# Patient Record
Sex: MALE | Race: WHITE | Employment: OTHER | URBAN - NONMETROPOLITAN AREA
[De-identification: names, ages, dates, MRNs, and addresses within clinical notes are randomized per-mention and may not be internally consistent; named-entity substitution may affect disease eponyms.]

---

## 2017-06-06 ENCOUNTER — OFFICE VISIT (OUTPATIENT)
Dept: CARDIOLOGY CLINIC | Age: 77
End: 2017-06-06

## 2017-06-06 ENCOUNTER — PROCEDURE VISIT (OUTPATIENT)
Dept: CARDIOLOGY CLINIC | Age: 77
End: 2017-06-06

## 2017-06-06 VITALS
WEIGHT: 192 LBS | HEIGHT: 71 IN | DIASTOLIC BLOOD PRESSURE: 74 MMHG | HEART RATE: 68 BPM | RESPIRATION RATE: 14 BRPM | SYSTOLIC BLOOD PRESSURE: 120 MMHG | BODY MASS INDEX: 26.88 KG/M2

## 2017-06-06 DIAGNOSIS — R06.02 SOB (SHORTNESS OF BREATH): ICD-10-CM

## 2017-06-06 DIAGNOSIS — E78.2 MIXED HYPERLIPIDEMIA: ICD-10-CM

## 2017-06-06 DIAGNOSIS — I10 ESSENTIAL HYPERTENSION: ICD-10-CM

## 2017-06-06 DIAGNOSIS — Z51.81 ENCOUNTER FOR MONITORING AMIODARONE THERAPY: ICD-10-CM

## 2017-06-06 DIAGNOSIS — Z95.820 S/P ANGIOPLASTY WITH STENT: ICD-10-CM

## 2017-06-06 DIAGNOSIS — I48.0 PAF (PAROXYSMAL ATRIAL FIBRILLATION) (HCC): Primary | ICD-10-CM

## 2017-06-06 DIAGNOSIS — Z79.899 ENCOUNTER FOR MONITORING AMIODARONE THERAPY: ICD-10-CM

## 2017-06-06 LAB
LV EF: 60 %
LVEF MODALITY: NORMAL

## 2017-06-06 PROCEDURE — 1123F ACP DISCUSS/DSCN MKR DOCD: CPT | Performed by: INTERNAL MEDICINE

## 2017-06-06 PROCEDURE — 78452 HT MUSCLE IMAGE SPECT MULT: CPT | Performed by: INTERNAL MEDICINE

## 2017-06-06 PROCEDURE — G8427 DOCREV CUR MEDS BY ELIG CLIN: HCPCS | Performed by: INTERNAL MEDICINE

## 2017-06-06 PROCEDURE — 1036F TOBACCO NON-USER: CPT | Performed by: INTERNAL MEDICINE

## 2017-06-06 PROCEDURE — 93000 ELECTROCARDIOGRAM COMPLETE: CPT | Performed by: INTERNAL MEDICINE

## 2017-06-06 PROCEDURE — 4040F PNEUMOC VAC/ADMIN/RCVD: CPT | Performed by: INTERNAL MEDICINE

## 2017-06-06 PROCEDURE — G8420 CALC BMI NORM PARAMETERS: HCPCS | Performed by: INTERNAL MEDICINE

## 2017-06-06 PROCEDURE — 93015 CV STRESS TEST SUPVJ I&R: CPT | Performed by: INTERNAL MEDICINE

## 2017-06-06 PROCEDURE — 99215 OFFICE O/P EST HI 40 MIN: CPT | Performed by: INTERNAL MEDICINE

## 2017-06-06 PROCEDURE — G8598 ASA/ANTIPLAT THER USED: HCPCS | Performed by: INTERNAL MEDICINE

## 2017-06-06 PROCEDURE — 94060 EVALUATION OF WHEEZING: CPT | Performed by: INTERNAL MEDICINE

## 2017-06-06 PROCEDURE — A9500 TC99M SESTAMIBI: HCPCS | Performed by: INTERNAL MEDICINE

## 2017-06-06 RX ORDER — LOVASTATIN 20 MG/1
20 TABLET ORAL NIGHTLY
Qty: 30 TABLET | Refills: 6 | Status: SHIPPED | OUTPATIENT
Start: 2017-06-06 | End: 2017-12-12 | Stop reason: SDUPTHER

## 2017-06-06 RX ORDER — INDAPAMIDE 1.25 MG/1
1.25 TABLET, FILM COATED ORAL EVERY MORNING
COMMUNITY
End: 2019-01-07 | Stop reason: SDUPTHER

## 2017-06-06 RX ORDER — AMIODARONE HYDROCHLORIDE 200 MG/1
200 TABLET ORAL DAILY
Qty: 30 TABLET | Refills: 6 | Status: SHIPPED | OUTPATIENT
Start: 2017-06-06 | End: 2017-12-12 | Stop reason: SDUPTHER

## 2017-06-07 ENCOUNTER — TELEPHONE (OUTPATIENT)
Dept: CARDIOLOGY CLINIC | Age: 77
End: 2017-06-07

## 2017-06-12 ENCOUNTER — HOSPITAL ENCOUNTER (OUTPATIENT)
Dept: CARDIAC REHAB | Age: 77
Discharge: HOME | End: 2017-06-12
Attending: INTERNAL MEDICINE | Admitting: INTERNAL MEDICINE

## 2017-06-12 DIAGNOSIS — R06.02 BREATH SHORTNESS: ICD-10-CM

## 2017-10-10 ENCOUNTER — HOSPITAL ENCOUNTER (OUTPATIENT)
Dept: LAB | Age: 77
Discharge: HOME | End: 2017-10-10
Attending: UROLOGY | Admitting: UROLOGY

## 2017-10-10 LAB — PROSTATE SPECIFIC ANTIGEN: 3.41 NG/ML (ref 0–4)

## 2017-12-07 ENCOUNTER — HOSPITAL ENCOUNTER (OUTPATIENT)
Dept: GENERAL RADIOLOGY | Age: 77
Discharge: HOME | End: 2017-12-07
Attending: FAMILY MEDICINE | Admitting: FAMILY MEDICINE

## 2017-12-07 DIAGNOSIS — M25.561 CHRONIC PAIN OF RIGHT KNEE: ICD-10-CM

## 2017-12-07 DIAGNOSIS — G89.29 CHRONIC PAIN OF RIGHT KNEE: ICD-10-CM

## 2017-12-12 ENCOUNTER — OFFICE VISIT (OUTPATIENT)
Dept: CARDIOLOGY CLINIC | Age: 77
End: 2017-12-12

## 2017-12-12 VITALS
SYSTOLIC BLOOD PRESSURE: 112 MMHG | HEIGHT: 71 IN | RESPIRATION RATE: 16 BRPM | WEIGHT: 187 LBS | BODY MASS INDEX: 26.18 KG/M2 | DIASTOLIC BLOOD PRESSURE: 62 MMHG | HEART RATE: 76 BPM

## 2017-12-12 DIAGNOSIS — E78.2 MIXED HYPERLIPIDEMIA: ICD-10-CM

## 2017-12-12 DIAGNOSIS — I48.0 PAF (PAROXYSMAL ATRIAL FIBRILLATION) (HCC): Primary | ICD-10-CM

## 2017-12-12 DIAGNOSIS — I10 ESSENTIAL HYPERTENSION: ICD-10-CM

## 2017-12-12 DIAGNOSIS — Z51.81 ENCOUNTER FOR MONITORING AMIODARONE THERAPY: ICD-10-CM

## 2017-12-12 DIAGNOSIS — Z95.820 S/P ANGIOPLASTY WITH STENT: ICD-10-CM

## 2017-12-12 DIAGNOSIS — Z79.899 ENCOUNTER FOR MONITORING AMIODARONE THERAPY: ICD-10-CM

## 2017-12-12 PROCEDURE — G8419 CALC BMI OUT NRM PARAM NOF/U: HCPCS | Performed by: INTERNAL MEDICINE

## 2017-12-12 PROCEDURE — G8598 ASA/ANTIPLAT THER USED: HCPCS | Performed by: INTERNAL MEDICINE

## 2017-12-12 PROCEDURE — 4040F PNEUMOC VAC/ADMIN/RCVD: CPT | Performed by: INTERNAL MEDICINE

## 2017-12-12 PROCEDURE — G8427 DOCREV CUR MEDS BY ELIG CLIN: HCPCS | Performed by: INTERNAL MEDICINE

## 2017-12-12 PROCEDURE — 99214 OFFICE O/P EST MOD 30 MIN: CPT | Performed by: INTERNAL MEDICINE

## 2017-12-12 PROCEDURE — 1036F TOBACCO NON-USER: CPT | Performed by: INTERNAL MEDICINE

## 2017-12-12 PROCEDURE — G8484 FLU IMMUNIZE NO ADMIN: HCPCS | Performed by: INTERNAL MEDICINE

## 2017-12-12 PROCEDURE — 1123F ACP DISCUSS/DSCN MKR DOCD: CPT | Performed by: INTERNAL MEDICINE

## 2017-12-12 PROCEDURE — 93000 ELECTROCARDIOGRAM COMPLETE: CPT | Performed by: INTERNAL MEDICINE

## 2017-12-12 RX ORDER — IBUPROFEN 800 MG/1
TABLET ORAL
Refills: 2 | COMMUNITY
Start: 2017-12-07 | End: 2019-08-05

## 2017-12-12 RX ORDER — AMIODARONE HYDROCHLORIDE 200 MG/1
200 TABLET ORAL DAILY
Qty: 30 TABLET | Refills: 6 | Status: SHIPPED | OUTPATIENT
Start: 2017-12-12 | End: 2018-07-02 | Stop reason: SDUPTHER

## 2017-12-12 RX ORDER — PNEUMOCOCCAL 13-VALENT CONJUGATE VACCINE 2.2; 2.2; 2.2; 2.2; 2.2; 4.4; 2.2; 2.2; 2.2; 2.2; 2.2; 2.2; 2.2 UG/.5ML; UG/.5ML; UG/.5ML; UG/.5ML; UG/.5ML; UG/.5ML; UG/.5ML; UG/.5ML; UG/.5ML; UG/.5ML; UG/.5ML; UG/.5ML; UG/.5ML
INJECTION, SUSPENSION INTRAMUSCULAR
Refills: 0 | COMMUNITY
Start: 2017-11-13 | End: 2019-08-05

## 2017-12-12 RX ORDER — LOVASTATIN 20 MG/1
20 TABLET ORAL NIGHTLY
Qty: 30 TABLET | Refills: 6 | Status: SHIPPED | OUTPATIENT
Start: 2017-12-12 | End: 2018-07-02 | Stop reason: SDUPTHER

## 2017-12-12 RX ORDER — INFLUENZA A VIRUS A/SINGAPORE/GP1908/2015 IVR-180 (H1N1) ANTIGEN (MDCK CELL DERIVED, PROPIOLACTONE INACTIVATED), INFLUENZA A VIRUS A/NORTH CAROLINA/04/2016 (H3N2) HEMAGGLUTININ ANTIGEN (MDCK CELL DERIVED, PROPIOLACTONE INACTIVATED), INFLUENZA B VIRUS B/IOWA/06/2017 HEMAGGLUTININ ANTIGEN (MDCK CELL DERIVED, PROPIOLACTONE INACTIVATED), INFLUENZA B VIRUS B/SINGAPORE/INFTT-16-0610/2016 HEMAGGLUTININ ANTIGEN (MDCK CELL DERIVED, PROPIOLACTONE INACTIVATED) 15; 15; 15; 15 UG/.5ML; UG/.5ML; UG/.5ML; UG/.5ML
INJECTION, SUSPENSION INTRAMUSCULAR
Refills: 0 | COMMUNITY
Start: 2017-11-13 | End: 2019-08-05

## 2017-12-12 NOTE — PROGRESS NOTES
Maximo Pina  1940  Nieves Villalta MD    Chief complaint and HPI:  Maximo Pina  is a 42-year-old male follows up for coronary artery disease status post PCI, hypertension and hyperlipidemia and paroxysmal atrial fibrillation on amiodarone therapy. He denies any palpitations, chest pain, dizziness, leg swelling. He is tolerating amiodarone well. He denies any change in his shortness of breath on activity. He hasn't been going to [facility] because he has developed painful bakers cyst behind his right knee. He is taking nonsteroidal anti-inflammatory drugs for this. He has been compliant to medications otherwise has been active. He doesn't smoke anymore used in the past.    Rest of the Cardiovascular system review is otherwise unchanged from prior encounter. Past medical history:  has a past medical history of Arthritis; Back pain, chronic; BPH (benign prostatic hyperplasia); CAD (coronary artery disease); Cancer ([facility] [address] Family history of coronary artery disease; H/O cardiovascular stress test; H/O echocardiogram; Hyperlipidemia; Hypertension; PAF (paroxysmal atrial fibrillation) ([facility] [address] S/P angioplasty with stent; SOB (shortness of breath); and Torsade de pointes. Past surgical history:  has a past surgical history that includes Abdominal hernia repair; Cataract removal; Diagnostic Cardiac Cath Lab Procedure ( 10/6/03;10/16/03;10/5/04;3/12); and Coronary angioplasty with stent. Social History:   Social History   Substance Use Topics    Smoking status: Former Smoker     Years: 25.00     Types: Cigarettes, Pipe     Quit date: 1/4/1980    Smokeless tobacco: Never Used    Alcohol use No      Comment: occassional     Family history: family history includes Coronary Art Dis in his brother and mother; Heart Attack in his mother; Hypertension in his mother. ALLERGIES:  Review of patient's allergies indicates no known allergies.   Prior to Admission medications    Medication Sig Start Date End Date Taking? Authorizing Provider   FLUCELVAX QUADRIVALENT SUSP  11/13/17  Yes Historical Provider, MD   PREVNAR 13 SUSP inj  11/13/17  Yes Historical Provider, MD   ibuprofen (ADVIL;MOTRIN) 800 MG tablet  12/7/17  Yes Historical Provider, MD   lovastatin (MEVACOR) 20 MG tablet Take 1 tablet by mouth nightly 12/12/17  Yes Kishan Dotson MD   amiodarone (CORDARONE) 200 MG tablet Take 1 tablet by mouth daily 12/12/17  Yes Kishan Dotson MD   indapamide (LOZOL) 1.25 MG tablet Take 1.25 mg by mouth every morning   Yes Historical Provider, MD   tamsulosin (FLOMAX) 0.4 MG capsule Take 0.4 mg by mouth daily 12/2/16  Yes Historical Provider, MD   Misc Natural Products (OSTEO BI-FLEX ADV JOINT SHIELD PO) Take by mouth daily   Yes Historical Provider, MD   multivitamin [facility]) per tablet Take 1 tablet by mouth daily. Yes Historical Provider, MD   amlodipine-benazepril (LOTREL) 5-20 MG per capsule Take 1 capsule by mouth daily. Yes Historical Provider, MD   aspirin 81 MG EC tablet Take 81 mg by mouth daily. Yes Historical Provider, MD     Constitutional:  /62 (Site: Left Arm, Position: Sitting, Cuff Size: Medium Adult)   Pulse 76   Resp 16   Ht 5' 11\" (1.803 m)   Wt 187 lb (84.8 kg)   BMI 26.08 kg/m²    Body mass index is 26.08 kg/m². Wt Readings from Last 3 Encounters:   12/12/17 187 lb (84.8 kg)   06/06/17 192 lb (87.1 kg)   12/05/16 187 lb (84.8 kg)     General exam: Patient is awake, alert and oriented and in no acute or apparent distress.   Head and Neck: Normocephalic. Neck is supple . Wears glasses   Carotids: no Bruits. No thyromegaly  Jugular venous pressure: not elevated. Heart[de-identified] Heart sounds are normal. No murmur  Peripheral Pulses: 1+  Extremities: no swelling  Chest: normal  Lungs:Lungs are clear to auscultation and percussion. No fine crepitations or rhonchi   Abdomen: Soft non tender.  Bowel sounds are normal. No organomegaly or ascites.   Musculoskeletal: WNL   Skin: Normal in color and texture. No rash   Psychiatric: Normal mood and effect. ECG showed a sinus rhythm 70 bpm low QRS voltages corrected QT interval is 409 ms. LAB REVIEW:  CBC:   Lab Results   Component Value Date    WBC 7.9 06/05/2013    HGB 14.9 06/05/2013    HCT 44.3 06/05/2013     06/05/2013     Lipids:   Lab Results   Component Value Date    CHOL 133 06/09/2016    TRIG 75 06/09/2016    HDL 40 (L) 06/09/2016    LDLCALC 71 05/29/2014    LDLDIRECT 81 06/09/2016     Renal:   Lab Results   Component Value Date    BUN 26 06/09/2016    CREATININE 1.3 06/09/2016     06/09/2016    K 4.1 06/09/2016     PT/INR:   Lab Results   Component Value Date    INR 0.96 06/05/2013     IMPRESSION and RECOMMENDATIONS:      S/P angioplasty with stent  Clinically stable. Continue aggressive risk modification for secondary prevention. PAF (paroxysmal atrial fibrillation)  Controlled on amiodarone therapy. He is tolerating it well we'll continue the same. Hyperlipidemia  Well-controlled on current medications. We will repeat the lipid analysis and liver enzymes. Continue current dose of statins. Hypertension  Well-controlled on current medications will continue the same    Encounter for monitoring amiodarone therapy  We'll check his TSH and liver function test.  His QTc interval is stable. Continue current cardiovascular medications which have been reviewed and discussed individually with you. Lipid analysis and Amio labs. . Primary/secondary prevention is the goal by aggressive risk modification, healthy and therapeutic life style changes for cardiovascular risk reduction. Various goals are discussed and questions answered. Appropriate prescriptions if needed on this visit are addressed. After visit summery is provided. Questions answered and patient verbalizes understanding. Follow up in office in 6 months with ECG sooner if needed.     Beatriz Messina MD, 12/12/2017 10:07 AM     Please note this report has

## 2017-12-18 ENCOUNTER — HOSPITAL ENCOUNTER (OUTPATIENT)
Dept: LAB | Age: 77
Discharge: HOME | End: 2017-12-18
Attending: INTERNAL MEDICINE | Admitting: INTERNAL MEDICINE

## 2017-12-18 LAB
ALBUMIN SERPL-MCNC: 3.9 GM/DL (ref 3.4–5)
ALP BLD-CCNC: 73 IU/L (ref 40–129)
ALT SERPL-CCNC: 12 U/L (ref 10–40)
ANION GAP SERPL CALCULATED.3IONS-SCNC: 12 MMOL/L (ref 4–16)
AST SERPL-CCNC: 15 IU/L (ref 15–37)
BILIRUB SERPL-MCNC: 0.9 MG/DL (ref 0–1)
BUN BLDV-MCNC: 27 MG/DL (ref 6–23)
CALCIUM SERPL-MCNC: 9 MG/DL (ref 8.3–10.6)
CHLORIDE BLD-SCNC: 101 MMOL/L (ref 99–110)
CHOLESTEROL, FASTING: 127 MG/DL
CO2: 30 MMOL/L (ref 21–32)
CREAT SERPL-MCNC: 1.3 MG/DL (ref 0.9–1.3)
GFR AFRICAN AMERICAN: >60 ML/MIN/1.73M2
GFR NON-AFRICAN AMERICAN: 53 ML/MIN/1.73M2
GLUCOSE FASTING: 106 MG/DL (ref 70–99)
HDLC SERPL-MCNC: 40 MG/DL
LDL CHOLESTEROL DIRECT: 77 MG/DL
POTASSIUM SERPL-SCNC: 3.8 MMOL/L (ref 3.5–5.1)
SODIUM BLD-SCNC: 143 MMOL/L (ref 135–145)
T4 FREE: 1.6 NG/DL (ref 0.9–1.8)
TOTAL PROTEIN: 6.4 GM/DL (ref 6.4–8.2)
TRIGLYCERIDE, FASTING: 86 MG/DL
TSH HIGH SENSITIVITY: 1.23 UIU/ML (ref 0.27–4.2)

## 2018-01-04 NOTE — PROGRESS NOTES
discussed. Questions answered. We had a lengthy discussion about the treatment options for managing arthritis. Options include activity modification (avoiding activities that make your symptoms worse) but staying active and keeping the legs as strong as possible, physical therapy, OTC medications, nutritional supplements, prescription medications, knee injections with steroid vs. Visco supplementation, and surgical options. Steroid injection   Rest right knee 24-48 hours   Physical therapy   Continue NSAIDS (ibuprofen) as needed   Add tylenol as needed   Follow up as needed       The patient was advised that NSAID-type medications have two very important potential side effects: gastrointestinal irritation including hemorrhage and renal injuries. He was asked to take the medication with food and to stop if he experiences any GI upset. I asked him to call for vomiting, abdominal pain or black/bloody stools. The patient expresses understanding of these issues and questions were answered. Add Tylenol (also known as acetaminophen) as needed   Take 2 extra strength (500 mg) or 3 regular strength (325 mg) 3-4 times a day  It is OK to take Tylenol in conjunction with NSAID's (Advil, Aleve, Naprosyn, etc)  If taking other medications that have acetaminophen ensure total acetaminophen dose for any 24 period is less than 4,000 mg    right knee injection procedure note    Pre-procedure diagnosis:  right knee DJD    Post-procedure diagnosis:  same    Procedure: The planned procedure/risks/benefits/alternatives were discussed with the patient. Risks include, but are not limited to, increased pain, drug reaction, infection, bleeding, lack of improvement, neurovascular injury, and increased blood sugar levels. The patient understood and all of their questions were answered. The knee was prepped with alcohol and betadine, then anesthetized with Ethyl Chloride spray.   A 22 gauge needle was advanced into the

## 2018-01-05 ENCOUNTER — OFFICE VISIT (OUTPATIENT)
Dept: ORTHOPEDIC SURGERY | Age: 78
End: 2018-01-05

## 2018-01-05 VITALS — HEIGHT: 71 IN | RESPIRATION RATE: 16 BRPM | BODY MASS INDEX: 26.18 KG/M2 | WEIGHT: 187 LBS

## 2018-01-05 DIAGNOSIS — M17.11 PRIMARY OSTEOARTHRITIS OF RIGHT KNEE: Primary | ICD-10-CM

## 2018-01-05 DIAGNOSIS — M25.561 RIGHT KNEE PAIN, UNSPECIFIED CHRONICITY: ICD-10-CM

## 2018-01-05 PROCEDURE — 4040F PNEUMOC VAC/ADMIN/RCVD: CPT | Performed by: ORTHOPAEDIC SURGERY

## 2018-01-05 PROCEDURE — 99204 OFFICE O/P NEW MOD 45 MIN: CPT | Performed by: ORTHOPAEDIC SURGERY

## 2018-01-05 PROCEDURE — G8484 FLU IMMUNIZE NO ADMIN: HCPCS | Performed by: ORTHOPAEDIC SURGERY

## 2018-01-05 PROCEDURE — G8419 CALC BMI OUT NRM PARAM NOF/U: HCPCS | Performed by: ORTHOPAEDIC SURGERY

## 2018-01-05 PROCEDURE — 73560 X-RAY EXAM OF KNEE 1 OR 2: CPT | Performed by: ORTHOPAEDIC SURGERY

## 2018-01-05 PROCEDURE — G8427 DOCREV CUR MEDS BY ELIG CLIN: HCPCS | Performed by: ORTHOPAEDIC SURGERY

## 2018-01-05 PROCEDURE — 20610 DRAIN/INJ JOINT/BURSA W/O US: CPT | Performed by: ORTHOPAEDIC SURGERY

## 2018-01-05 ASSESSMENT — ENCOUNTER SYMPTOMS
EYES NEGATIVE: 1
GASTROINTESTINAL NEGATIVE: 1
RESPIRATORY NEGATIVE: 1
BACK PAIN: 1

## 2018-01-23 ENCOUNTER — HOSPITAL ENCOUNTER (OUTPATIENT)
Dept: PHYSICAL THERAPY | Age: 78
Discharge: HOME | End: 2018-01-31
Attending: ORTHOPAEDIC SURGERY | Admitting: ORTHOPAEDIC SURGERY

## 2018-01-23 ASSESSMENT — PAIN DESCRIPTION - FREQUENCY: FREQUENCY: INTERMITTENT

## 2018-01-23 ASSESSMENT — PAIN DESCRIPTION - PROGRESSION: CLINICAL_PROGRESSION: GRADUALLY IMPROVING

## 2018-01-23 ASSESSMENT — PAIN SCALES - GENERAL: PAINLEVEL_OUTOF10: 4

## 2018-01-23 ASSESSMENT — PAIN DESCRIPTION - PAIN TYPE: TYPE: CHRONIC PAIN

## 2018-01-23 ASSESSMENT — PAIN DESCRIPTION - DESCRIPTORS: DESCRIPTORS: ACHING;SHARP

## 2018-01-23 ASSESSMENT — PAIN DESCRIPTION - LOCATION: LOCATION: KNEE

## 2018-01-23 ASSESSMENT — PAIN DESCRIPTION - ORIENTATION: ORIENTATION: RIGHT

## 2018-01-23 NOTE — FLOWSHEET NOTE
Outpatient Physical Therapy           [place]           [] Phone: [phone]   Fax: [phone]  [place] [facility]           [x] Phone: [phone]   Fax: [phone]    Physical Therapy Daily Treatment Note  Date:  2018    Patient Name:  Stacie Pulido    :  1940  MRN: [id number]  Restrictions/Precautions: none  Diagnosis:   Diagnosis: R knee pain  Date of Surgery:   Treatment Diagnosis: Treatment Diagnosis: R knee OA, mild weekness, limited ROM , pain    Insurance/Certification information:  medicare  Referring Physician:  Referring Practitioner: Ashli Robert Doctor Visit:  prn  Plan of care signed (Y/N):  no  Visit# / total visits:  -  Pain level: 4/10   Goals:       Short term goals  Time Frame for Short term goals: 4 visit   Short term goal 1: ind with HEP for knee ORm and strength  Short term goal 2: avg pain wiht light gym workout 210  Short term goal 3: PROM flex 123 ext 0 R kknee  Short term goal 4: stairs 2/10 pain or less reproted            Subjective:  Pt reports he was ind with all typical activity prior to 4 months ago , then the pain began to limit his stairs most and getting out of chairs, He reports that the cortisone shot has helped this greatly he still has pain in the R knee and L knee 4/10  throughout , also reprots he has a bakers cyst.      Any changes in Ambulatory Summary Sheet? no      Objective:  See eval        Exercises:  Exercise/Equipment Date Date Date Date     nustep         SLR flex          SLR abd         SAQ         HS stretch         gastroc stretch         PROM R knee         Mini squats         GT fwd/ lat                                                                                           Other Therapeutic Activities/Education:      Home Exercise Program:  Pt completed 1 set of HEP in clinic with instruction per HEP sheet dated today. Appeared to understand program. Any questions clarified.      Modality/intervention used:

## 2018-01-23 NOTE — PROGRESS NOTES
Physical Therapy  Initial Assessment  Date: 2018  Patient Name: Berdine Sacks  MRN: [id number]  : 1940     Treatment Diagnosis: R knee OA, mild weekness, limited ROM , pain    Restrictions  Restrictions/Precautions  Restrictions/Precautions:  (none)    Subjective   General  Chart Reviewed: Yes  Additional Pertinent Hx: cortisone shot helped greatly  Family / Caregiver Present: No  Referring Practitioner: Malissa  Referral Date : 18  Diagnosis: R knee pain  Follows Commands: Within Functional Limits  General Comment  Comments: xray shows OA  PT Visit Information  PT Insurance Information: medicare  Total # of Visits Approved:  (prn)  Subjective  Subjective: Pt states he had a cortisone shot int he R knee with Dr Dae Thomas and it has helped greatly with stairs, notes he still has pain in the R knee and L knee 4/10  throughout , also reprots he has a bakers cyst.  Pain Screening  Patient Currently in Pain: Yes  Pain Assessment  Pain Assessment: 0-10  Pain Level: 4  Pain Type: Chronic pain  Pain Location: Knee  Pain Orientation: Right  Pain Descriptors: Aching; Sharp  Pain Frequency: Intermittent  Clinical Progression: Gradually improving  Effect of Pain on Daily Activities: stairs, walking incresaed distances, slow to get out of chair light [employer] work out, getting out of small auto  Patient's Stated Pain Goal: 2  Pain Intervention(s): Medication (see eMar); Rest  Vital Signs  Patient Currently in Pain: Yes    Vision/Hearing  Vision  Vision: Within Functional Limits  Hearing  Hearing: Within functional limits    Orientation  Orientation  Overall Orientation Status: Within Normal Limits    Social/Functional History  Social/Functional History  Lives With: Spouse  Type of Home: House  Home Layout: Two level  Bathroom Shower/Tub: Tub/Shower unit  Bathroom Toilet: Standard  ADL Assistance: Independent  Homemaking Assistance:  (wife does this)  Homemaking Responsibilities: No  Ambulation Assistance:

## 2018-02-01 ENCOUNTER — HOSPITAL ENCOUNTER (OUTPATIENT)
Dept: PHYSICAL THERAPY | Age: 78
Discharge: HOME | End: 2018-02-22
Attending: ORTHOPAEDIC SURGERY | Admitting: ORTHOPAEDIC SURGERY

## 2018-07-02 ENCOUNTER — OFFICE VISIT (OUTPATIENT)
Dept: CARDIOLOGY CLINIC | Age: 78
End: 2018-07-02

## 2018-07-02 VITALS
BODY MASS INDEX: 25.9 KG/M2 | DIASTOLIC BLOOD PRESSURE: 66 MMHG | RESPIRATION RATE: 16 BRPM | SYSTOLIC BLOOD PRESSURE: 112 MMHG | WEIGHT: 185 LBS | HEART RATE: 76 BPM | HEIGHT: 71 IN

## 2018-07-02 DIAGNOSIS — Z95.820 S/P ANGIOPLASTY WITH STENT: Primary | ICD-10-CM

## 2018-07-02 DIAGNOSIS — R06.02 SOB (SHORTNESS OF BREATH): ICD-10-CM

## 2018-07-02 DIAGNOSIS — I48.0 PAF (PAROXYSMAL ATRIAL FIBRILLATION) (HCC): ICD-10-CM

## 2018-07-02 DIAGNOSIS — I10 ESSENTIAL HYPERTENSION: ICD-10-CM

## 2018-07-02 DIAGNOSIS — I25.10 CORONARY ARTERY DISEASE INVOLVING NATIVE CORONARY ARTERY OF NATIVE HEART WITHOUT ANGINA PECTORIS: ICD-10-CM

## 2018-07-02 DIAGNOSIS — E78.2 MIXED HYPERLIPIDEMIA: ICD-10-CM

## 2018-07-02 PROCEDURE — 1036F TOBACCO NON-USER: CPT | Performed by: INTERNAL MEDICINE

## 2018-07-02 PROCEDURE — 99214 OFFICE O/P EST MOD 30 MIN: CPT | Performed by: INTERNAL MEDICINE

## 2018-07-02 PROCEDURE — 4040F PNEUMOC VAC/ADMIN/RCVD: CPT | Performed by: INTERNAL MEDICINE

## 2018-07-02 PROCEDURE — G8419 CALC BMI OUT NRM PARAM NOF/U: HCPCS | Performed by: INTERNAL MEDICINE

## 2018-07-02 PROCEDURE — 1123F ACP DISCUSS/DSCN MKR DOCD: CPT | Performed by: INTERNAL MEDICINE

## 2018-07-02 PROCEDURE — 93000 ELECTROCARDIOGRAM COMPLETE: CPT | Performed by: INTERNAL MEDICINE

## 2018-07-02 PROCEDURE — G8427 DOCREV CUR MEDS BY ELIG CLIN: HCPCS | Performed by: INTERNAL MEDICINE

## 2018-07-02 PROCEDURE — G8598 ASA/ANTIPLAT THER USED: HCPCS | Performed by: INTERNAL MEDICINE

## 2018-07-02 RX ORDER — LOVASTATIN 20 MG/1
20 TABLET ORAL NIGHTLY
Qty: 30 TABLET | Refills: 6 | Status: SHIPPED | OUTPATIENT
Start: 2018-07-02 | End: 2019-01-07 | Stop reason: SDUPTHER

## 2018-07-02 RX ORDER — AMIODARONE HYDROCHLORIDE 200 MG/1
200 TABLET ORAL DAILY
Qty: 30 TABLET | Refills: 6 | Status: SHIPPED | OUTPATIENT
Start: 2018-07-02 | End: 2019-01-07 | Stop reason: SDUPTHER

## 2018-07-02 NOTE — PROGRESS NOTES
[id number] Score for Atrial Fibrillation Stroke Risk   Risk   Factors  Component Value   C CHF No 0   H HTN Yes 1   A2 Age >= 76 Yes,  (73 y.o.) 2   D DM No 0   S2 Prior Stroke/TIA No 0   V Vascular Disease No 0   A Age 74-69 No,  (73 y.o.) 0   Sc Sex male 0    [id number]  Score  3   Score last updated 4/9/40 10:67 AM    Click here for a link to the UpToDate guideline \"Atrial Fibrillation: Anticoagulation therapy to prevent embolization    Disclaimer: Risk Score calculation is dependent on accuracy of patient problem list and past encounter diagnosis.
office in 6 months, sooner if needed. Neo Whaley MD, 7/2/2018 11:44 AM     Please note this report has been partially produced using speech recognition software and may contain errors related to that system including errors in grammar, punctuation, and spelling, as well as words and phrases that may be inappropriate. If there are any questions or concerns please feel free to contact the dictating provider for clarification.

## 2018-08-20 ENCOUNTER — HOSPITAL ENCOUNTER (OUTPATIENT)
Dept: LAB | Age: 78
Discharge: HOME | End: 2018-08-20
Attending: FAMILY MEDICINE | Admitting: FAMILY MEDICINE

## 2018-08-20 LAB
ALBUMIN SERPL-MCNC: 4.3 GM/DL (ref 3.4–5)
ALP BLD-CCNC: 80 IU/L (ref 40–129)
ALT SERPL-CCNC: 12 U/L (ref 10–40)
ANION GAP SERPL CALCULATED.3IONS-SCNC: 11 MMOL/L (ref 4–16)
AST SERPL-CCNC: 13 IU/L (ref 15–37)
BILIRUB SERPL-MCNC: 0.9 MG/DL (ref 0–1)
BUN BLDV-MCNC: 29 MG/DL (ref 6–23)
CALCIUM SERPL-MCNC: 9.3 MG/DL (ref 8.3–10.6)
CHLORIDE BLD-SCNC: 101 MMOL/L (ref 99–110)
CHOLESTEROL, FASTING: 122 MG/DL
CO2: 30 MMOL/L (ref 21–32)
CREAT SERPL-MCNC: 1.4 MG/DL (ref 0.9–1.3)
GFR AFRICAN AMERICAN: 59 ML/MIN/1.73M2
GFR NON-AFRICAN AMERICAN: 49 ML/MIN/1.73M2
GLUCOSE FASTING: 104 MG/DL (ref 70–99)
HDLC SERPL-MCNC: 38 MG/DL
LDL CHOLESTEROL DIRECT: 82 MG/DL
POTASSIUM SERPL-SCNC: 4.2 MMOL/L (ref 3.5–5.1)
SODIUM BLD-SCNC: 142 MMOL/L (ref 135–145)
TOTAL PROTEIN: 6.6 GM/DL (ref 6.4–8.2)
TRIGLYCERIDE, FASTING: 103 MG/DL
TSH HIGH SENSITIVITY: 0.86 UIU/ML (ref 0.27–4.2)

## 2018-10-15 ENCOUNTER — HOSPITAL ENCOUNTER (OUTPATIENT)
Age: 78
Discharge: HOME | End: 2018-10-15
Payer: MEDICARE

## 2018-10-15 LAB — PROSTATE SPECIFIC ANTIGEN: 4.05 NG/ML (ref 0–4)

## 2018-10-15 PROCEDURE — G0103 PSA SCREENING: HCPCS

## 2018-10-15 PROCEDURE — 36415 COLL VENOUS BLD VENIPUNCTURE: CPT

## 2018-12-14 ENCOUNTER — OFFICE VISIT (OUTPATIENT)
Dept: ORTHOPEDIC SURGERY | Age: 78
End: 2018-12-14
Payer: MEDICARE

## 2018-12-14 VITALS — RESPIRATION RATE: 14 BRPM | WEIGHT: 185 LBS | OXYGEN SATURATION: 96 % | BODY MASS INDEX: 25.8 KG/M2 | HEART RATE: 91 BPM

## 2018-12-14 DIAGNOSIS — M17.11 PRIMARY OSTEOARTHRITIS OF RIGHT KNEE: Primary | ICD-10-CM

## 2018-12-14 DIAGNOSIS — M17.12 PRIMARY OSTEOARTHRITIS OF LEFT KNEE: ICD-10-CM

## 2018-12-14 DIAGNOSIS — R52 PAIN: ICD-10-CM

## 2018-12-14 PROCEDURE — 1123F ACP DISCUSS/DSCN MKR DOCD: CPT | Performed by: ORTHOPAEDIC SURGERY

## 2018-12-14 PROCEDURE — G8419 CALC BMI OUT NRM PARAM NOF/U: HCPCS | Performed by: ORTHOPAEDIC SURGERY

## 2018-12-14 PROCEDURE — 1101F PT FALLS ASSESS-DOCD LE1/YR: CPT | Performed by: ORTHOPAEDIC SURGERY

## 2018-12-14 PROCEDURE — G8484 FLU IMMUNIZE NO ADMIN: HCPCS | Performed by: ORTHOPAEDIC SURGERY

## 2018-12-14 PROCEDURE — 1036F TOBACCO NON-USER: CPT | Performed by: ORTHOPAEDIC SURGERY

## 2018-12-14 PROCEDURE — 20610 DRAIN/INJ JOINT/BURSA W/O US: CPT | Performed by: ORTHOPAEDIC SURGERY

## 2018-12-14 PROCEDURE — G8598 ASA/ANTIPLAT THER USED: HCPCS | Performed by: ORTHOPAEDIC SURGERY

## 2018-12-14 PROCEDURE — G8427 DOCREV CUR MEDS BY ELIG CLIN: HCPCS | Performed by: ORTHOPAEDIC SURGERY

## 2018-12-14 PROCEDURE — 4040F PNEUMOC VAC/ADMIN/RCVD: CPT | Performed by: ORTHOPAEDIC SURGERY

## 2018-12-14 PROCEDURE — 99213 OFFICE O/P EST LOW 20 MIN: CPT | Performed by: ORTHOPAEDIC SURGERY

## 2018-12-14 ASSESSMENT — ENCOUNTER SYMPTOMS
BACK PAIN: 1
EYES NEGATIVE: 1
GASTROINTESTINAL NEGATIVE: 1
SHORTNESS OF BREATH: 1

## 2018-12-14 NOTE — PROGRESS NOTES
ml of depo-medrol (80mg/ml). The injection site was cleansed with isopropyl alcohol and a band-aid was placed. Complications:  None, the patient   the procedure well. Instructions: The patient was advised to rest the knee and decrease activity for the next 24 to 48 hours. May use prescription or OTC pain relievers as needed for any post-injection pain as well as ice.

## 2019-01-07 ENCOUNTER — OFFICE VISIT (OUTPATIENT)
Dept: CARDIOLOGY CLINIC | Age: 79
End: 2019-01-07
Payer: MEDICARE

## 2019-01-07 VITALS
SYSTOLIC BLOOD PRESSURE: 101 MMHG | WEIGHT: 182 LBS | BODY MASS INDEX: 25.48 KG/M2 | DIASTOLIC BLOOD PRESSURE: 60 MMHG | HEART RATE: 79 BPM | HEIGHT: 71 IN

## 2019-01-07 DIAGNOSIS — I48.0 PAF (PAROXYSMAL ATRIAL FIBRILLATION) (HCC): ICD-10-CM

## 2019-01-07 DIAGNOSIS — E78.2 MIXED HYPERLIPIDEMIA: ICD-10-CM

## 2019-01-07 DIAGNOSIS — I10 ESSENTIAL HYPERTENSION: ICD-10-CM

## 2019-01-07 DIAGNOSIS — Z95.820 S/P ANGIOPLASTY WITH STENT: ICD-10-CM

## 2019-01-07 DIAGNOSIS — R06.02 SOB (SHORTNESS OF BREATH): Primary | ICD-10-CM

## 2019-01-07 PROCEDURE — 1101F PT FALLS ASSESS-DOCD LE1/YR: CPT | Performed by: INTERNAL MEDICINE

## 2019-01-07 PROCEDURE — G8419 CALC BMI OUT NRM PARAM NOF/U: HCPCS | Performed by: INTERNAL MEDICINE

## 2019-01-07 PROCEDURE — 4040F PNEUMOC VAC/ADMIN/RCVD: CPT | Performed by: INTERNAL MEDICINE

## 2019-01-07 PROCEDURE — 1123F ACP DISCUSS/DSCN MKR DOCD: CPT | Performed by: INTERNAL MEDICINE

## 2019-01-07 PROCEDURE — G8427 DOCREV CUR MEDS BY ELIG CLIN: HCPCS | Performed by: INTERNAL MEDICINE

## 2019-01-07 PROCEDURE — 99214 OFFICE O/P EST MOD 30 MIN: CPT | Performed by: INTERNAL MEDICINE

## 2019-01-07 PROCEDURE — 1036F TOBACCO NON-USER: CPT | Performed by: INTERNAL MEDICINE

## 2019-01-07 PROCEDURE — G8598 ASA/ANTIPLAT THER USED: HCPCS | Performed by: INTERNAL MEDICINE

## 2019-01-07 PROCEDURE — 93000 ELECTROCARDIOGRAM COMPLETE: CPT | Performed by: INTERNAL MEDICINE

## 2019-01-07 PROCEDURE — G8484 FLU IMMUNIZE NO ADMIN: HCPCS | Performed by: INTERNAL MEDICINE

## 2019-01-07 RX ORDER — AMIODARONE HYDROCHLORIDE 100 MG/1
100 TABLET ORAL DAILY
Qty: 30 TABLET | Refills: 6 | Status: SHIPPED | OUTPATIENT
Start: 2019-01-07 | End: 2019-08-05 | Stop reason: DRUGHIGH

## 2019-01-07 RX ORDER — LOVASTATIN 20 MG/1
20 TABLET ORAL NIGHTLY
Qty: 30 TABLET | Refills: 6 | Status: SHIPPED | OUTPATIENT
Start: 2019-01-07 | End: 2019-08-05 | Stop reason: SDUPTHER

## 2019-01-07 RX ORDER — INDAPAMIDE 1.25 MG/1
1.25 TABLET, FILM COATED ORAL EVERY OTHER DAY
Qty: 45 TABLET | Refills: 5
Start: 2019-01-07 | End: 2019-08-05 | Stop reason: DRUGHIGH

## 2019-07-09 ENCOUNTER — HOSPITAL ENCOUNTER (OUTPATIENT)
Age: 79
Discharge: HOME | End: 2019-07-09
Payer: MEDICARE

## 2019-07-09 LAB
ALBUMIN SERPL-MCNC: 3.9 GM/DL (ref 3.4–5)
ALP BLD-CCNC: 73 IU/L (ref 40–129)
ALT SERPL-CCNC: 13 U/L (ref 10–40)
ANION GAP SERPL CALCULATED.3IONS-SCNC: 9 MMOL/L (ref 4–16)
AST SERPL-CCNC: 13 IU/L (ref 15–37)
BILIRUB SERPL-MCNC: 0.8 MG/DL (ref 0–1)
BUN BLDV-MCNC: 27 MG/DL (ref 6–23)
CALCIUM SERPL-MCNC: 9.6 MG/DL (ref 8.3–10.6)
CHLORIDE BLD-SCNC: 103 MMOL/L (ref 99–110)
CHOLESTEROL, FASTING: 130 MG/DL
CO2: 31 MMOL/L (ref 21–32)
CREAT SERPL-MCNC: 1.3 MG/DL (ref 0.9–1.3)
GFR AFRICAN AMERICAN: >60 ML/MIN/1.73M2
GFR NON-AFRICAN AMERICAN: 53 ML/MIN/1.73M2
GLUCOSE FASTING: 103 MG/DL (ref 70–99)
HDLC SERPL-MCNC: 40 MG/DL
LDL CHOLESTEROL DIRECT: 84 MG/DL
POTASSIUM SERPL-SCNC: 4.1 MMOL/L (ref 3.5–5.1)
SODIUM BLD-SCNC: 143 MMOL/L (ref 135–145)
TOTAL PROTEIN: 6.2 GM/DL (ref 6.4–8.2)
TRIGLYCERIDE, FASTING: 110 MG/DL

## 2019-07-09 PROCEDURE — 36415 COLL VENOUS BLD VENIPUNCTURE: CPT

## 2019-07-09 PROCEDURE — 80053 COMPREHEN METABOLIC PANEL: CPT

## 2019-07-09 PROCEDURE — 80061 LIPID PANEL: CPT

## 2019-08-05 ENCOUNTER — OFFICE VISIT (OUTPATIENT)
Dept: CARDIOLOGY CLINIC | Age: 79
End: 2019-08-05
Payer: MEDICARE

## 2019-08-05 VITALS
RESPIRATION RATE: 16 BRPM | DIASTOLIC BLOOD PRESSURE: 70 MMHG | HEIGHT: 71 IN | BODY MASS INDEX: 26.32 KG/M2 | SYSTOLIC BLOOD PRESSURE: 116 MMHG | HEART RATE: 77 BPM | WEIGHT: 188 LBS

## 2019-08-05 DIAGNOSIS — I10 ESSENTIAL HYPERTENSION: ICD-10-CM

## 2019-08-05 DIAGNOSIS — Z95.820 S/P ANGIOPLASTY WITH STENT: Primary | ICD-10-CM

## 2019-08-05 DIAGNOSIS — M79.89 LEG SWELLING: ICD-10-CM

## 2019-08-05 DIAGNOSIS — E78.2 MIXED HYPERLIPIDEMIA: ICD-10-CM

## 2019-08-05 DIAGNOSIS — R06.02 SOB (SHORTNESS OF BREATH): ICD-10-CM

## 2019-08-05 DIAGNOSIS — I48.0 PAF (PAROXYSMAL ATRIAL FIBRILLATION) (HCC): ICD-10-CM

## 2019-08-05 PROCEDURE — 4040F PNEUMOC VAC/ADMIN/RCVD: CPT | Performed by: INTERNAL MEDICINE

## 2019-08-05 PROCEDURE — G8427 DOCREV CUR MEDS BY ELIG CLIN: HCPCS | Performed by: INTERNAL MEDICINE

## 2019-08-05 PROCEDURE — G8419 CALC BMI OUT NRM PARAM NOF/U: HCPCS | Performed by: INTERNAL MEDICINE

## 2019-08-05 PROCEDURE — 1036F TOBACCO NON-USER: CPT | Performed by: INTERNAL MEDICINE

## 2019-08-05 PROCEDURE — G8598 ASA/ANTIPLAT THER USED: HCPCS | Performed by: INTERNAL MEDICINE

## 2019-08-05 PROCEDURE — 1123F ACP DISCUSS/DSCN MKR DOCD: CPT | Performed by: INTERNAL MEDICINE

## 2019-08-05 PROCEDURE — 99214 OFFICE O/P EST MOD 30 MIN: CPT | Performed by: INTERNAL MEDICINE

## 2019-08-05 PROCEDURE — 93000 ELECTROCARDIOGRAM COMPLETE: CPT | Performed by: INTERNAL MEDICINE

## 2019-08-05 RX ORDER — AMIODARONE HYDROCHLORIDE 200 MG/1
TABLET ORAL
COMMUNITY
End: 2019-08-05 | Stop reason: SDUPTHER

## 2019-08-05 RX ORDER — INDAPAMIDE 1.25 MG/1
1.25 TABLET, FILM COATED ORAL EVERY MORNING
COMMUNITY
End: 2022-06-06 | Stop reason: SDUPTHER

## 2019-08-05 RX ORDER — IBUPROFEN 200 MG
200 TABLET ORAL EVERY 6 HOURS PRN
COMMUNITY
End: 2020-08-11

## 2019-08-05 RX ORDER — LOVASTATIN 20 MG/1
20 TABLET ORAL NIGHTLY
Qty: 30 TABLET | Refills: 6 | Status: SHIPPED
Start: 2019-08-05 | End: 2020-02-11 | Stop reason: ALTCHOICE

## 2019-08-05 RX ORDER — AMIODARONE HYDROCHLORIDE 200 MG/1
TABLET ORAL
Qty: 15 TABLET | Refills: 6 | Status: SHIPPED | OUTPATIENT
Start: 2019-08-05 | End: 2020-04-27

## 2019-08-05 NOTE — PATIENT INSTRUCTIONS
Echo and lower extremity venous doppler and chart check. Counseled to elevate feet when resting and when resting in bed at night with pillows underneath and use thigh high compression stockings in the morning and remove them at night. Patient verbalizes understanding. Questions answered. Continue current cardiovascular medications which have been reviewed and discussed individually with you. Continue to exercise. Appropriate prescriptions if needed on this visit are addressed. After visit summery is provided. Questions answered and patient verbalizes understanding. Follow up in office in 6 months with ECG, sooner if needed. Please hold on to these instructions the  will call you within 1-9 business days when we receive authorization from your insurance. Echocardiogram    WHAT TO EXPECT:   ? This test will take approximately 45 minutes. ? It is an ultrasound of the heart. ? It can look at the valves and chambers inside the heart   ? There is no special instructions for this test.     If you are unable to keep this appointment, please notify us 24 hours prior to test at [phone]. Please hold on to these instructions the  will call you within 1-9 business days when we receive authorization from your insurance. Venous Ultrasound    WHAT TO EXPECT:   ? This test will take approximately 60 minutes. ? It is an ultrasound of the veins. ? It can look for blood clots in the extremities or  for venous reflux. ? There is no special instructions for this test.     If you are unable to keep this appointment, please notify us 24 hours prior to test at [phone].

## 2019-08-13 ENCOUNTER — PROCEDURE VISIT (OUTPATIENT)
Dept: CARDIOLOGY CLINIC | Age: 79
End: 2019-08-13
Payer: MEDICARE

## 2019-08-13 DIAGNOSIS — R06.02 SOB (SHORTNESS OF BREATH): ICD-10-CM

## 2019-08-13 DIAGNOSIS — R06.02 SOB (SHORTNESS OF BREATH): Primary | ICD-10-CM

## 2019-08-13 DIAGNOSIS — M79.89 LEG SWELLING: Primary | ICD-10-CM

## 2019-08-13 LAB
LV EF: 53 %
LVEF MODALITY: NORMAL

## 2019-08-13 PROCEDURE — 93970 EXTREMITY STUDY: CPT | Performed by: INTERNAL MEDICINE

## 2019-08-13 PROCEDURE — 93306 TTE W/DOPPLER COMPLETE: CPT | Performed by: INTERNAL MEDICINE

## 2019-08-15 ENCOUNTER — TELEPHONE (OUTPATIENT)
Dept: CARDIOLOGY CLINIC | Age: 79
End: 2019-08-15

## 2019-08-15 NOTE — TELEPHONE ENCOUNTER
No evidence of DVT or SVT in the bilateral common femoral vein, femoral    vein, popliteal vein, greater saphenous vein or left small saphenous vein.    There appears to be calcified thrombus with in the right small saphenous    vein.    Significant reflux noted of the Right CFV 5.0s.          Summary   Normal left ventricle size, wall thickness and wall motion with normal   systolic function Ejection Fraction 93-54 %.   Diastolic Dysfunction Grade I .   Sclerotic, but non-stenotic aortic valve.   Mitral annular calcification is present.   No significant valvular regurgitation noted.   Aortic root dimension upper limits of normal 3.7cm.  RVSP= 25 mmHg.   No evidence of pericardial effusion. LM for patient to contact office for message.

## 2019-08-16 ENCOUNTER — TELEPHONE (OUTPATIENT)
Dept: CARDIOLOGY CLINIC | Age: 79
End: 2019-08-16

## 2019-10-14 ENCOUNTER — HOSPITAL ENCOUNTER (OUTPATIENT)
Age: 79
Discharge: HOME | End: 2019-10-14
Payer: MEDICARE

## 2019-10-14 LAB — PROSTATE SPECIFIC ANTIGEN: 5.78 NG/ML (ref 0–4)

## 2019-10-14 PROCEDURE — 36415 COLL VENOUS BLD VENIPUNCTURE: CPT

## 2019-10-14 PROCEDURE — G0103 PSA SCREENING: HCPCS

## 2019-12-17 ENCOUNTER — HOSPITAL ENCOUNTER (OUTPATIENT)
Dept: GENERAL RADIOLOGY | Age: 79
Discharge: HOME | End: 2019-12-17
Payer: MEDICARE

## 2019-12-17 ENCOUNTER — HOSPITAL ENCOUNTER (OUTPATIENT)
Age: 79
Discharge: HOME | End: 2019-12-17
Payer: MEDICARE

## 2019-12-17 DIAGNOSIS — M25.562 CHRONIC PAIN OF BOTH KNEES: ICD-10-CM

## 2019-12-17 DIAGNOSIS — G89.29 CHRONIC PAIN OF BOTH KNEES: ICD-10-CM

## 2019-12-17 DIAGNOSIS — M25.561 CHRONIC PAIN OF BOTH KNEES: ICD-10-CM

## 2019-12-17 PROCEDURE — 73562 X-RAY EXAM OF KNEE 3: CPT

## 2020-02-11 ENCOUNTER — OFFICE VISIT (OUTPATIENT)
Dept: CARDIOLOGY CLINIC | Age: 80
End: 2020-02-11
Payer: MEDICARE

## 2020-02-11 VITALS
HEIGHT: 71 IN | WEIGHT: 181.4 LBS | SYSTOLIC BLOOD PRESSURE: 114 MMHG | HEART RATE: 64 BPM | DIASTOLIC BLOOD PRESSURE: 58 MMHG | BODY MASS INDEX: 25.4 KG/M2

## 2020-02-11 PROCEDURE — G8417 CALC BMI ABV UP PARAM F/U: HCPCS | Performed by: INTERNAL MEDICINE

## 2020-02-11 PROCEDURE — 1123F ACP DISCUSS/DSCN MKR DOCD: CPT | Performed by: INTERNAL MEDICINE

## 2020-02-11 PROCEDURE — G8427 DOCREV CUR MEDS BY ELIG CLIN: HCPCS | Performed by: INTERNAL MEDICINE

## 2020-02-11 PROCEDURE — 4040F PNEUMOC VAC/ADMIN/RCVD: CPT | Performed by: INTERNAL MEDICINE

## 2020-02-11 PROCEDURE — 99214 OFFICE O/P EST MOD 30 MIN: CPT | Performed by: INTERNAL MEDICINE

## 2020-02-11 PROCEDURE — 93000 ELECTROCARDIOGRAM COMPLETE: CPT | Performed by: INTERNAL MEDICINE

## 2020-02-11 PROCEDURE — 1036F TOBACCO NON-USER: CPT | Performed by: INTERNAL MEDICINE

## 2020-02-11 PROCEDURE — G8484 FLU IMMUNIZE NO ADMIN: HCPCS | Performed by: INTERNAL MEDICINE

## 2020-02-11 RX ORDER — ATORVASTATIN CALCIUM 40 MG/1
40 TABLET, FILM COATED ORAL DAILY
Qty: 90 TABLET | Refills: 3 | Status: SHIPPED | OUTPATIENT
Start: 2020-02-11 | End: 2020-08-11 | Stop reason: SDUPTHER

## 2020-02-11 NOTE — PROGRESS NOTES
masses or tenderness. No organomegaly noted. Musculoskeletal:  No significant spinal deformities noted.  Gait is normal  Muscle strength is normal.  Neurologic:  Oriented to time, place, and person and non-anxious. No focal neurological deficit noted. Psychiatric: Normal mood and effect     EKG today is consistent with normal sinus rhythm 63 bpm.  QTc is 408 ms. LAB REVIEW:  CBC:   Lab Results   Component Value Date    WBC 7.9 06/05/2013    HGB 14.9 06/05/2013    HCT 44.3 06/05/2013     06/05/2013     Lipids:   Lab Results   Component Value Date    CHOL 133 06/09/2016    TRIG 75 06/09/2016    HDL 40 (L) 07/09/2019    LDLDIRECT 84 07/09/2019     Renal:   Lab Results   Component Value Date    BUN 27 07/09/2019    CREATININE 1.3 07/09/2019     07/09/2019    K 4.1 07/09/2019     PT/INR:   Lab Results   Component Value Date    INR 0.96 06/05/2013     IMPRESSION and RECOMMENDATIONS:      Hyperlipidemia  Change Lovostatin to Lipitor 40 mg daily if tolerated and repeat Lipids in 2 months. LDL goal is below 70 if possible. S/P angioplasty with stent  Clinically stable. Continue risk modification for secondary prevention and follow-up. SOB (shortness of breath)  Has been stable. Leg swelling  Has resolved    Hypertension  Well controlled on current medications, reviewed individually with patient. PAF (paroxysmal atrial fibrillation) (HCC)  Well controlled on current medications continue the same. Change Lovostatin to Lipitor 40 mg daily if tolerated and repeat Lipids in 2 months. LDL goal is below 70 if possible. Primary/secondary prevention is the goal by aggressive risk modification, healthy and therapeutic life style changes for cardiovascular risk reduction. Various goals are discussed and questions answered. Appropriate prescriptions if needed on this visit are addressed. After visit summery is provided. Questions answered and patient verbalizes understanding.  Follow up in 6 months with ECG,  sooner if needed. Isaura Cosby MD, 2/11/2020 3:37 PM     Please note this report has been partially produced using speech recognition software and may contain errors related to that system including errors in grammar, punctuation, and spelling, as well as words and phrases that may be inappropriate. If there are any questions or concerns please feel free to contact the dictating provider for clarification.

## 2020-02-11 NOTE — PATIENT INSTRUCTIONS
Change Lovostatin to Lipitor 40 mg daily if tolerated and repeat Lipids in 2 months. LDL goal is below 70 if possible. Primary/secondary prevention is the goal by aggressive risk modification, healthy and therapeutic life style changes for cardiovascular risk reduction. Various goals are discussed and questions answered. Appropriate prescriptions if needed on this visit are addressed. After visit summery is provided. Questions answered and patient verbalizes understanding. Follow up in 6 months with ECG,  sooner if needed.

## 2020-02-25 ENCOUNTER — HOSPITAL ENCOUNTER (OUTPATIENT)
Age: 80
Discharge: HOME | End: 2020-02-25
Payer: MEDICARE

## 2020-02-25 ENCOUNTER — OFFICE VISIT (OUTPATIENT)
Dept: INTERNAL MEDICINE CLINIC | Age: 80
End: 2020-02-25
Payer: MEDICARE

## 2020-02-25 ENCOUNTER — HOSPITAL ENCOUNTER (OUTPATIENT)
Dept: GENERAL RADIOLOGY | Age: 80
Discharge: HOME | End: 2020-02-25
Payer: MEDICARE

## 2020-02-25 VITALS
HEIGHT: 71 IN | OXYGEN SATURATION: 96 % | SYSTOLIC BLOOD PRESSURE: 110 MMHG | DIASTOLIC BLOOD PRESSURE: 60 MMHG | TEMPERATURE: 98.1 F | WEIGHT: 183 LBS | HEART RATE: 96 BPM | BODY MASS INDEX: 25.62 KG/M2

## 2020-02-25 PROCEDURE — G8484 FLU IMMUNIZE NO ADMIN: HCPCS | Performed by: INTERNAL MEDICINE

## 2020-02-25 PROCEDURE — 71046 X-RAY EXAM CHEST 2 VIEWS: CPT

## 2020-02-25 PROCEDURE — G8417 CALC BMI ABV UP PARAM F/U: HCPCS | Performed by: INTERNAL MEDICINE

## 2020-02-25 PROCEDURE — 4040F PNEUMOC VAC/ADMIN/RCVD: CPT | Performed by: INTERNAL MEDICINE

## 2020-02-25 PROCEDURE — G8427 DOCREV CUR MEDS BY ELIG CLIN: HCPCS | Performed by: INTERNAL MEDICINE

## 2020-02-25 PROCEDURE — 99203 OFFICE O/P NEW LOW 30 MIN: CPT | Performed by: INTERNAL MEDICINE

## 2020-02-25 PROCEDURE — 1036F TOBACCO NON-USER: CPT | Performed by: INTERNAL MEDICINE

## 2020-02-25 PROCEDURE — 1123F ACP DISCUSS/DSCN MKR DOCD: CPT | Performed by: INTERNAL MEDICINE

## 2020-02-25 RX ORDER — GUAIFENESIN 600 MG/1
600 TABLET, EXTENDED RELEASE ORAL 2 TIMES DAILY
Qty: 30 TABLET | Refills: 0 | Status: SHIPPED | OUTPATIENT
Start: 2020-02-25 | End: 2020-03-11

## 2020-02-25 RX ORDER — AZITHROMYCIN 250 MG/1
250 TABLET, FILM COATED ORAL SEE ADMIN INSTRUCTIONS
Qty: 6 TABLET | Refills: 0 | Status: SHIPPED | OUTPATIENT
Start: 2020-02-25 | End: 2020-03-01

## 2020-02-25 SDOH — ECONOMIC STABILITY: FOOD INSECURITY: WITHIN THE PAST 12 MONTHS, YOU WORRIED THAT YOUR FOOD WOULD RUN OUT BEFORE YOU GOT MONEY TO BUY MORE.: NEVER TRUE

## 2020-02-25 SDOH — ECONOMIC STABILITY: FOOD INSECURITY: WITHIN THE PAST 12 MONTHS, THE FOOD YOU BOUGHT JUST DIDN'T LAST AND YOU DIDN'T HAVE MONEY TO GET MORE.: NEVER TRUE

## 2020-02-25 SDOH — ECONOMIC STABILITY: TRANSPORTATION INSECURITY
IN THE PAST 12 MONTHS, HAS THE LACK OF TRANSPORTATION KEPT YOU FROM MEDICAL APPOINTMENTS OR FROM GETTING MEDICATIONS?: NO

## 2020-02-25 SDOH — ECONOMIC STABILITY: TRANSPORTATION INSECURITY
IN THE PAST 12 MONTHS, HAS LACK OF TRANSPORTATION KEPT YOU FROM MEETINGS, WORK, OR FROM GETTING THINGS NEEDED FOR DAILY LIVING?: NO

## 2020-02-25 SDOH — ECONOMIC STABILITY: INCOME INSECURITY: HOW HARD IS IT FOR YOU TO PAY FOR THE VERY BASICS LIKE FOOD, HOUSING, MEDICAL CARE, AND HEATING?: NOT HARD AT ALL

## 2020-02-25 ASSESSMENT — PATIENT HEALTH QUESTIONNAIRE - PHQ9
SUM OF ALL RESPONSES TO PHQ9 QUESTIONS 1 & 2: 0
1. LITTLE INTEREST OR PLEASURE IN DOING THINGS: 0
2. FEELING DOWN, DEPRESSED OR HOPELESS: 0
SUM OF ALL RESPONSES TO PHQ QUESTIONS 1-9: 0
SUM OF ALL RESPONSES TO PHQ QUESTIONS 1-9: 0

## 2020-02-25 NOTE — PROGRESS NOTES
no blurry vision, no diplopia, no dryness, no itchiness  Mouth: no oral ulcers, no dry mouth, no sore throat  Nose: no nasal congestion, no epistaxis  Cardiac: no chest pain, no palpitations, no leg swelling, no orthopnea, no PND, no syncope  Pulmonary: no dyspnea, chest tightness, cough, wheezing, no hemoptysis  GI: no nausea, no vomiting, no diarrhea, no constipation, no abdominal pain, no hematochezia  : no dysuria, no frequency, no urgency, no hematuria, no frothy urine  MSK: no arthralgias, no myalgias, no early morning stiffness, no Raynaud's   Neuro: no focal neurological deficits, no seizures  Sleep: no snoring, no daytime somnolence   Psych: no depression, no anxiety, no suicidal ideation      Physical Exam:  VITALS:   /60   Pulse 96   Temp 98.1 °F (36.7 °C) (Oral)   Ht 5' 11\" (1.803 m)   Wt 183 lb (83 kg)   SpO2 96%   BMI 25.52 kg/m²     PHYSICAL EXAMINATION:  General: alert, awake, and oriented to time, place, person, and situation. Not in acute distress   Skin:  no suspicious rashes, no jaundice  Head: normocephalic/atraumatic  Eyes: anicteric sclera, well-injected conjunctiva. Pupils are equally round and reactive to light. Extraocular movements are intact   Nose/Sinuses: no sinus tenderness, septum midline, mucosa appears normal   Oropharynx: lips, teeth, and tongue normal. Non-erythematous pharynx, no oral ulcers, moist mucous membranes, no tonsillar exudates   Ears: external ears normal  Neck: supple, no cervical lymphadenopathy, thyroid symmetric and not enlarged, no bruits   Heart: regular rate and rhythm, regular S1/S2, no S3/S4, no audible murmurs, no audible friction rub  Lungs: bilateral crackles, no audible wheezes, no audible rhonchi    Abdomen: normal bowel sounds, soft abdomen, non-tender, no palpable masses  Extremities: no edema, warm, no cyanosis, no clubbing. Good capillary refill   MSK: no tenderness across spinous processes, full ROM in all 4 extremities.  No joint

## 2020-02-27 ENCOUNTER — TELEPHONE (OUTPATIENT)
Dept: INTERNAL MEDICINE CLINIC | Age: 80
End: 2020-02-27

## 2020-04-27 RX ORDER — AMIODARONE HYDROCHLORIDE 200 MG/1
TABLET ORAL
Qty: 15 TABLET | Refills: 6 | Status: SHIPPED | OUTPATIENT
Start: 2020-04-27 | End: 2020-08-11 | Stop reason: SDUPTHER

## 2020-05-11 ENCOUNTER — HOSPITAL ENCOUNTER (OUTPATIENT)
Age: 80
Discharge: HOME | End: 2020-05-11
Payer: MEDICARE

## 2020-05-11 LAB
ALBUMIN SERPL-MCNC: 3.9 GM/DL (ref 3.4–5)
ALP BLD-CCNC: 87 IU/L (ref 40–129)
ALT SERPL-CCNC: 18 U/L (ref 10–40)
ANION GAP SERPL CALCULATED.3IONS-SCNC: 10 MMOL/L (ref 4–16)
AST SERPL-CCNC: 15 IU/L (ref 15–37)
BILIRUB SERPL-MCNC: 0.7 MG/DL (ref 0–1)
BUN BLDV-MCNC: 21 MG/DL (ref 6–23)
CALCIUM SERPL-MCNC: 9.3 MG/DL (ref 8.3–10.6)
CHLORIDE BLD-SCNC: 101 MMOL/L (ref 99–110)
CHOLESTEROL: 101 MG/DL
CO2: 31 MMOL/L (ref 21–32)
CREAT SERPL-MCNC: 1.2 MG/DL (ref 0.9–1.3)
GFR AFRICAN AMERICAN: >60 ML/MIN/1.73M2
GFR NON-AFRICAN AMERICAN: 58 ML/MIN/1.73M2
GLUCOSE BLD-MCNC: 115 MG/DL (ref 70–99)
HDLC SERPL-MCNC: 36 MG/DL
LDL CHOLESTEROL DIRECT: 54 MG/DL
POTASSIUM SERPL-SCNC: 4 MMOL/L (ref 3.5–5.1)
PROSTATE SPECIFIC ANTIGEN: 3.74 NG/ML (ref 0–4)
SODIUM BLD-SCNC: 142 MMOL/L (ref 135–145)
TOTAL PROTEIN: 6.2 GM/DL (ref 6.4–8.2)
TRIGL SERPL-MCNC: 101 MG/DL

## 2020-05-11 PROCEDURE — 80061 LIPID PANEL: CPT

## 2020-05-11 PROCEDURE — 36415 COLL VENOUS BLD VENIPUNCTURE: CPT

## 2020-05-11 PROCEDURE — 83721 ASSAY OF BLOOD LIPOPROTEIN: CPT

## 2020-05-11 PROCEDURE — 80053 COMPREHEN METABOLIC PANEL: CPT

## 2020-05-11 PROCEDURE — G0103 PSA SCREENING: HCPCS

## 2020-06-22 ENCOUNTER — OFFICE VISIT (OUTPATIENT)
Dept: SURGERY | Age: 80
End: 2020-06-22
Payer: MEDICARE

## 2020-06-22 VITALS
OXYGEN SATURATION: 98 % | RESPIRATION RATE: 14 BRPM | DIASTOLIC BLOOD PRESSURE: 64 MMHG | SYSTOLIC BLOOD PRESSURE: 122 MMHG | HEART RATE: 81 BPM | BODY MASS INDEX: 25.72 KG/M2 | WEIGHT: 183.7 LBS | HEIGHT: 71 IN | TEMPERATURE: 98.4 F

## 2020-06-22 PROCEDURE — 1123F ACP DISCUSS/DSCN MKR DOCD: CPT | Performed by: SURGERY

## 2020-06-22 PROCEDURE — 99203 OFFICE O/P NEW LOW 30 MIN: CPT | Performed by: SURGERY

## 2020-06-22 PROCEDURE — 1036F TOBACCO NON-USER: CPT | Performed by: SURGERY

## 2020-06-22 PROCEDURE — G8417 CALC BMI ABV UP PARAM F/U: HCPCS | Performed by: SURGERY

## 2020-06-22 PROCEDURE — G8428 CUR MEDS NOT DOCUMENT: HCPCS | Performed by: SURGERY

## 2020-06-22 PROCEDURE — 4040F PNEUMOC VAC/ADMIN/RCVD: CPT | Performed by: SURGERY

## 2020-07-13 ENCOUNTER — PROCEDURE VISIT (OUTPATIENT)
Dept: SURGERY | Age: 80
End: 2020-07-13
Payer: MEDICARE

## 2020-07-13 ENCOUNTER — HOSPITAL ENCOUNTER (OUTPATIENT)
Age: 80
Setting detail: SPECIMEN
Discharge: HOME | End: 2020-07-13
Payer: MEDICARE

## 2020-07-13 VITALS
WEIGHT: 183.1 LBS | SYSTOLIC BLOOD PRESSURE: 110 MMHG | OXYGEN SATURATION: 97 % | TEMPERATURE: 98.2 F | HEIGHT: 71 IN | DIASTOLIC BLOOD PRESSURE: 64 MMHG | HEART RATE: 85 BPM | BODY MASS INDEX: 25.63 KG/M2 | RESPIRATION RATE: 14 BRPM

## 2020-07-13 PROCEDURE — 88304 TISSUE EXAM BY PATHOLOGIST: CPT

## 2020-07-13 PROCEDURE — 24071 EXC ARM/ELBOW LES SC 3 CM/>: CPT | Performed by: SURGERY

## 2020-07-13 ASSESSMENT — ENCOUNTER SYMPTOMS
EYES NEGATIVE: 1
GASTROINTESTINAL NEGATIVE: 1
RESPIRATORY NEGATIVE: 1
ALLERGIC/IMMUNOLOGIC NEGATIVE: 1

## 2020-07-13 NOTE — PROGRESS NOTES
Chief Complaint   Patient presents with    Wound Check     OV Proc R shoulder lipoma excision       SUBJECTIVE:  HPI: Patient presents today for an office procedure. Complaining of superior right should mass that he noticed around 2 months ago while in the shower. It has not increased in size, drained, or become infected that he knows of. He states it occasionally causes tenderness. Pt is ready to have this lesion removed. I have reviewed the patient's(pertinent information tothis visit) medical history, family history(scanned in  the Media tab under \"patient questioner\"), social history and review of systems with the patient today in the office. Past Surgical History:   Procedure Laterality Date    ABDOMINAL HERNIA REPAIR      CATARACT REMOVAL      CORONARY ANGIOPLASTY WITH STENT PLACEMENT      DIAGNOSTIC CARDIAC CATH LAB PROCEDURE   10/6/03;10/16/03;10/5/04;3/12    PTCA with stents; 3-12 cont med therapy     Past Medical History:   Diagnosis Date    Arthritis     Back pain, chronic     BPH (benign prostatic hyperplasia)     CAD (coronary artery disease)     stents RCA, Cx, LAD    Cancer ([facility] [address] 12/3/12    skin cancer removed    Family history of coronary artery disease     H/O cardiovascular stress test 6/6/17, 3/13/12    Normal perfusion in distribution of all coronaries and normal left ventricular size and function. EF 60%     H/O Doppler ultrasound 08/13/2019    No evidence of DVT or SVT. There appears to be calcified thrombus with in the right small saphenous vein. Significant reflux noted of the Right CFV 5.0s.    H/O echocardiogram 1/12; 8/13/2019    EF 50-55%. Diastolic Dysfunction Grade I . Sclerotic, but non-stenotic aortic valve. Aortic root dimension upper limits of normal 3.7cm.      Hyperlipidemia     Hypertension     Leg swelling 8/5/2019    [facility]ox[facility]     S/P angioplasty with stent 10/6/03;10/16/03;10/5/04    stent RCA; stent CX; stent LAD 2003 &     SOB (shortness of breath) 2017    Torsade de pointes 2012     Family History   Problem Relation Age of Onset    Coronary Art Dis Mother     Hypertension Mother     Heart Attack Mother     Coronary Art Dis Brother      Social History     Socioeconomic History    Marital status:      Spouse name: Not on file    Number of children: Not on file    Years of education: Not on file    Highest education level: Not on file   Occupational History    Not on file   Social Needs    Financial resource strain: Not hard at all   [place] insecurity     Worry: Never true     Inability: Never true   [employer] needs     Medical: No     Non-medical: No   Tobacco Use    Smoking status: Former Smoker     Years: 25.00     Types: Cigarettes, Pipe     Last attempt to quit: 1980     Years since quittin.5    Smokeless tobacco: Never Used   Substance and Sexual Activity    Alcohol use: No     Comment: occassional    Drug use: No    Sexual activity: Yes     Partners: Female     Comment:    Lifestyle    Physical activity     Days per week: Not on file     Minutes per session: Not on file    Stress: Not on file   Relationships    Social connections     Talks on phone: Not on file     Gets together: Not on file     Attends Faith service: Not on file     Active member of club or organization: Not on file     Attends meetings of clubs or organizations: Not on file     Relationship status: Not on file    Intimate partner violence     Fear of current or ex partner: Not on file     Emotionally abused: Not on file     Physically abused: Not on file     Forced sexual activity: Not on file   Other Topics Concern    Not on file   Social History Narrative    Not on file       Current Outpatient Medications   Medication Sig Dispense Refill    amiodarone (CORDARONE) 200 MG tablet TAKE 1/2 TABLET DAILY.  15 tablet 6    atorvastatin (LIPITOR) 40 MG tablet Take 1 tablet by mouth daily soft subcutaneous mass with distinct boarders, non-TTP   Skin:     General: Skin is warm and dry. Neurological:      General: No focal deficit present. Mental Status: He is alert and oriented to person, place, and time. Mental status is at baseline. Psychiatric:         Mood and Affect: Mood normal.         Behavior: Behavior normal.         Thought Content: Thought content normal.         Judgment: Judgment normal.           ASSESSMENT:  1. Lipoma of right upper extremity          PLAN:  Treatment:  Patient counseled on risks, benefits, and alternatives of treatment plan at length today. Patient states an understanding and willingness to proceed with plan. Office Procedure note:      Pre-op Diagnosis:    1. Lipoma of right upper extremity         Post-op Diagnosis:  Same. Procedure:  Excision of right superior shoulder mass 5 cm    Surgeon:   Junaid Flanagan MD. Gayle Nur MD    Anesthesia:   Local with 1% Lidocaine local infiltration,without epinephrine. Complications:  None. Drains: None    Indications:   See progress note. Procedure: The patient is placed with the above described area exposed. This was prepped, draped and anesthestized in the usual sterile manner. An incision was created. The lesion is excised completely and sent to pathology. The resulting wound is closed with running deep dermal 3-0 Vicryl followed by inturrupted 4-0 nylon. Bacitracin and a sterile dressing was applied. The patient was instructed on wound care. No orders of the defined types were placed in this encounter. No orders of the defined types were placed in this encounter. Follow Up:  Return in about 2 weeks (around 7/27/2020). to remove sutures and review path report. Ryland Jones MD          <><><><><><><><><><><><><><><><><><><><><><><><><><><><><><><><><>    I have personally performed face to face diagnostic evaluation on this patient.  I have personally reviewed pertinent

## 2020-07-16 ASSESSMENT — ENCOUNTER SYMPTOMS
COLOR CHANGE: 0
PHOTOPHOBIA: 0
ANAL BLEEDING: 0
EYE REDNESS: 0
CONSTIPATION: 0
SORE THROAT: 0
EYE ITCHING: 0
RECTAL PAIN: 0
CHOKING: 0
APNEA: 0
STRIDOR: 0
BACK PAIN: 0

## 2020-07-16 NOTE — PROGRESS NOTES
SUBJECTIVE:  HPI: Patient presents for evaluation of asoft tissue mass. Mass is located on the right shoulder. Mass was first noticed 6 years ago. Mass has increased in size. Mass has associatedsymptoms of mild tenderness. I have reviewed the patient's(pertinent information to this visit) medical history, familyhistory(scanned in  the Media tab under \"patient questioner\"), social history and review of systems with the patient today in the office. Past Surgical History:   Procedure Laterality Date    ABDOMINAL HERNIA REPAIR      CATARACT REMOVAL      CORONARY ANGIOPLASTY WITH STENT PLACEMENT      DIAGNOSTIC CARDIAC CATH LAB PROCEDURE   10/6/03;10/16/03;10/5/04;3/12    PTCA with stents; 3-12 cont med therapy     Past Medical History:   Diagnosis Date    Arthritis     Back pain, chronic     BPH (benign prostatic hyperplasia)     CAD (coronary artery disease)     stents RCA, Cx, LAD    Cancer ([address] 12/3/12    skin cancer removed    Family history of coronary artery disease     H/O cardiovascular stress test 6/6/17, 3/13/12    Normal perfusion in distribution of all coronaries and normal left ventricular size and function. EF 60%     H/O Doppler ultrasound 08/13/2019    No evidence of DVT or SVT. There appears to be calcified thrombus with in the right small saphenous vein. Significant reflux noted of the Right CFV 5.0s.    H/O echocardiogram 1/12; 8/13/2019    EF 50-55%. Diastolic Dysfunction Grade I . Sclerotic, but non-stenotic aortic valve. Aortic root dimension upper limits of normal 3.7cm.      Hyperlipidemia     Hypertension     Leg swelling 8/5/2019    [facility]oxysmal [facility] fibrillation[facility] ([facility])     S/P angioplasty with stent 10/6/03;10/16/03;10/5/04    stent RCA; stent CX; stent LAD 2003 & 2004    SOB (shortness of breath) 6/6/2017    Torsade de pointes 1/8/2012     Family History   Problem Relation Age of Onset    Coronary Art Dis Mother     Hypertension Mother     Heart Attack Mother     Coronary Art Dis Brother      Social History     Socioeconomic History    Marital status:      Spouse name: Not on file    Number of children: Not on file    Years of education: Not on file    Highest education level: Not on file   Occupational History    Not on file   Social Needs    Financial resource strain: Not hard at all   [place] insecurity     Worry: Never true     Inability: Never true   [employer] needs     Medical: No     Non-medical: No   Tobacco Use    Smoking status: Former Smoker     Years: 25.00     Types: Cigarettes, Pipe     Last attempt to quit: 1980     Years since quittin.5    Smokeless tobacco: Never Used   Substance and Sexual Activity    Alcohol use: No     Comment: occassional    Drug use: No    Sexual activity: Yes     Partners: Female     Comment:    Lifestyle    Physical activity     Days per week: Not on file     Minutes per session: Not on file    Stress: Not on file   Relationships    Social connections     Talks on phone: Not on file     Gets together: Not on file     Attends Baptism service: Not on file     Active member of club or organization: Not on file     Attends meetings of clubs or organizations: Not on file     Relationship status: Not on file    Intimate partner violence     Fear of current or ex partner: Not on file     Emotionally abused: Not on file     Physically abused: Not on file     Forced sexual activity: Not on file   Other Topics Concern    Not on file   Social History Narrative    Not on file       Current Outpatient Medications   Medication Sig Dispense Refill    amiodarone (CORDARONE) 200 MG tablet TAKE 1/2 TABLET DAILY.  15 tablet 6    atorvastatin (LIPITOR) 40 MG tablet Take 1 tablet by mouth daily 90 tablet 3    indapamide (LOZOL) 1.25 MG tablet Take 1.25 mg by mouth every morning      ibuprofen (ADVIL;MOTRIN) 200 MG tablet Take 200 mg by mouth every 6 hours as needed for Pain      tamsulosin (FLOMAX) 0.4 MG capsule Take 0.4 mg by mouth daily      multivitamin (THERAGRAN) per tablet Take 1 tablet by mouth daily.  amlodipine-benazepril (LOTREL) 5-20 MG per capsule Take 1 capsule by mouth daily.  aspirin 81 MG EC tablet Take 81 mg by mouth daily. No current facility-administered medications for this visit. No Known Allergies    Review of Systems:         Review of Systems   Constitutional: Negative for chills and fever. HENT: Negative for ear pain, mouth sores, sore throat and tinnitus. Eyes: Negative for photophobia, redness and itching. Respiratory: Negative for apnea, choking and stridor. Cardiovascular: Negative for chest pain and palpitations. Gastrointestinal: Negative for anal bleeding, constipation and rectal pain. Endocrine: Negative for polydipsia. Genitourinary: Negative for enuresis, flank pain and hematuria. Musculoskeletal: Negative for back pain, joint swelling and myalgias. Skin: Negative for color change and pallor. Allergic/Immunologic: Negative for environmental allergies. Neurological: Negative for syncope and speech difficulty. Psychiatric/Behavioral: Negative for confusion and hallucinations. OBJECTIVE:  Physical Exam:     /64 (Site: Left Upper Arm, Position: Sitting, Cuff Size: Medium Adult)   Pulse 81   Temp 98.4 °F (36.9 °C) (Infrared)   Resp 14   Ht 5' 11\" (1.803 m)   Wt 183 lb 11.2 oz (83.3 kg)   SpO2 98%   BMI 25.62 kg/m²      Physical Exam  Constitutional:       Appearance: He is well-developed. HENT:      Head: Normocephalic. Eyes:      Pupils: Pupils are equal, round, and reactive to light. Neck:      Musculoskeletal: Normal range of motion and neck supple. Cardiovascular:      Rate and Rhythm: Normal rate. Pulmonary:      Effort: Pulmonary effort is normal.   Abdominal:      General: There is no distension. Palpations: Abdomen is soft. There is no mass. Tenderness:  There is no abdominal tenderness. There is no guarding or rebound. Musculoskeletal: Normal range of motion. Arms:    Skin:     General: Skin is warm. Neurological:      Mental Status: He is alert and oriented to person, place, and time. ASSESSMENT:  1. Lipoma of torso          PLAN:  Treatment:  Will proceed with office procedure next wk. Patient counseled on risks, benefits, and alternatives of treatment plan at length today. Patient states an understanding and willingness to proceed with plan. No orders of the defined types were placed in this encounter. No orders of the defined types were placed in this encounter. Follow Up:  No follow-ups on file.         Ayana Prabhakar MD

## 2020-07-23 ENCOUNTER — TELEPHONE (OUTPATIENT)
Dept: SURGERY | Age: 80
End: 2020-07-23

## 2020-07-23 ENCOUNTER — NURSE ONLY (OUTPATIENT)
Dept: SURGERY | Age: 80
End: 2020-07-23

## 2020-07-23 VITALS — TEMPERATURE: 98.8 F

## 2020-07-23 NOTE — TELEPHONE ENCOUNTER
Patient would like pathology results from his office procedure last week with Dr. rEiberto Osorio.  7/13/20

## 2020-07-23 NOTE — PROGRESS NOTES
Emely Lopez here for suture removal from right shoulder lipoma Excision on 7/13/20. Seven sutures intact. Some bruising noted around site. Incision site looks good with no sign of drainage. Denies any pain. All seven sutures removed without incident. Patient tolerated well. Instructed on after care.

## 2020-08-11 ENCOUNTER — TELEPHONE (OUTPATIENT)
Dept: CARDIOLOGY CLINIC | Age: 80
End: 2020-08-11

## 2020-08-11 ENCOUNTER — OFFICE VISIT (OUTPATIENT)
Dept: CARDIOLOGY CLINIC | Age: 80
End: 2020-08-11
Payer: MEDICARE

## 2020-08-11 VITALS
DIASTOLIC BLOOD PRESSURE: 68 MMHG | RESPIRATION RATE: 16 BRPM | HEART RATE: 56 BPM | SYSTOLIC BLOOD PRESSURE: 102 MMHG | TEMPERATURE: 97.3 F | HEIGHT: 71 IN | WEIGHT: 185 LBS | BODY MASS INDEX: 25.9 KG/M2

## 2020-08-11 PROBLEM — M79.89 LEG SWELLING: Status: RESOLVED | Noted: 2019-08-05 | Resolved: 2020-08-11

## 2020-08-11 PROCEDURE — 1123F ACP DISCUSS/DSCN MKR DOCD: CPT | Performed by: INTERNAL MEDICINE

## 2020-08-11 PROCEDURE — 93000 ELECTROCARDIOGRAM COMPLETE: CPT | Performed by: INTERNAL MEDICINE

## 2020-08-11 PROCEDURE — G8417 CALC BMI ABV UP PARAM F/U: HCPCS | Performed by: INTERNAL MEDICINE

## 2020-08-11 PROCEDURE — 1036F TOBACCO NON-USER: CPT | Performed by: INTERNAL MEDICINE

## 2020-08-11 PROCEDURE — 99214 OFFICE O/P EST MOD 30 MIN: CPT | Performed by: INTERNAL MEDICINE

## 2020-08-11 PROCEDURE — 4040F PNEUMOC VAC/ADMIN/RCVD: CPT | Performed by: INTERNAL MEDICINE

## 2020-08-11 PROCEDURE — G8427 DOCREV CUR MEDS BY ELIG CLIN: HCPCS | Performed by: INTERNAL MEDICINE

## 2020-08-11 RX ORDER — AMIODARONE HYDROCHLORIDE 200 MG/1
100 TABLET ORAL DAILY
Qty: 45 TABLET | Refills: 3 | Status: SHIPPED | OUTPATIENT
Start: 2020-08-11 | End: 2021-08-31 | Stop reason: SDUPTHER

## 2020-08-11 RX ORDER — AMLODIPINE BESYLATE AND BENAZEPRIL HYDROCHLORIDE 5; 20 MG/1; MG/1
1 CAPSULE ORAL DAILY
Qty: 30 CAPSULE | Refills: 5 | Status: SHIPPED | OUTPATIENT
Start: 2020-08-11 | End: 2021-08-31 | Stop reason: SDUPTHER

## 2020-08-11 RX ORDER — ATORVASTATIN CALCIUM 40 MG/1
40 TABLET, FILM COATED ORAL DAILY
Qty: 30 TABLET | Refills: 5 | Status: SHIPPED | OUTPATIENT
Start: 2020-08-11 | End: 2021-04-23

## 2020-08-11 NOTE — PROGRESS NOTES
[id number] Score for Atrial Fibrillation Stroke Risk   Risk   Factors  Component Value   C CHF No 0   H HTN Yes 1   A2 Age >= 76 Yes,  (78 y.o.) 2   D DM No 0   S2 Prior Stroke/TIA No 0   V Vascular Disease No 0   A Age 74-69 No,  (78 y.o.) 0   Sc Sex male 0    [id number]  Score  3   Score last updated 8/11/20 3:70 PM EDT    Click here for a link to the UpToDate guideline \"Atrial Fibrillation: Anticoagulation therapy to prevent embolization    Disclaimer: Risk Score calculation is dependent on accuracy of patient problem list and past encounter diagnosis.

## 2020-08-11 NOTE — PROGRESS NOTES
Ann Marie Magana  1940  Rob Jorge MD    Chief complaint and HPI:  Ann Marie Magana  is a 78 y.o. male following up for known coronary artery disease, hypertension and hyperlipidemia. Patient feels that he is more short of breath now than he did last visit. He denies any chest pain or claudication. He has aches and pains in the muscles which she believes is due to atorvastatin which she did not have with lovastatin. His lipid results are discussed for me this year and they are much improved on atorvastatin. He has history of paroxysmal atrial fibrillation denies any palpitations syncope or near-syncope. He stays active for his age over due to pandemic [place] and he did not excise like he did before. He is compliant to his medications and they have been reviewed with him. Rest of the Cardiovascular system review is otherwise unchanged from prior encounter. Past medical history:  has a past medical history of Arthritis, Back pain, chronic, BPH (benign prostatic hyperplasia), CAD (coronary artery disease), Cancer ([address] Family history of coronary artery disease, H/O cardiovascular stress test, H/O Doppler ultrasound, H/O echocardiogram, Hyperlipidemia, Hypertension, Leg swelling, PAF (paroxysmal atrial fibrillation) ([facility] [address] S/P angioplasty with stent, SOB (shortness of breath), and Torsade de pointes. Past surgical history:  has a past surgical history that includes Abdominal hernia repair; Cataract removal; Diagnostic Cardiac Cath Lab Procedure ( 10/6/03;10/16/03;10/5/04;3/12); and Coronary angioplasty with stent.   Social History:   Social History     Tobacco Use    Smoking status: Former Smoker     Years: 25.00     Types: Cigarettes, Pipe     Last attempt to quit: 1980     Years since quittin.6    Smokeless tobacco: Never Used   Substance Use Topics    Alcohol use: No     Comment: occassional     Family history: family history includes Coronary Art Dis in his brother and mother; Heart Attack in his mother; Hypertension in his mother. ALLERGIES:  Patient has no known allergies. Prior to Admission medications    Medication Sig Start Date End Date Taking? Authorizing Provider   DICLOFENAC PO Take 75 mg by mouth 2 times daily as needed   Yes Historical Provider, MD   amiodarone (CORDARONE) 200 MG tablet Take 0.5 tablets by mouth daily 8/11/20  Yes Mally Arzate MD   atorvastatin (LIPITOR) 40 MG tablet Take 1 tablet by mouth daily 8/11/20  Yes Mally Arzate MD   amLODIPine-benazepril (LOTREL) 5-20 MG per capsule Take 1 capsule by mouth daily 8/11/20  Yes Mally Arzate MD   indapamide (LOZOL) 1.25 MG tablet Take 1.25 mg by mouth every morning   Yes Historical Provider, MD   tamsulosin (FLOMAX) 0.4 MG capsule Take 0.4 mg by mouth daily 12/2/16  Yes Historical Provider, MD   multivitamin [facility]) per tablet Take 1 tablet by mouth daily. Yes Historical Provider, MD   aspirin 81 MG EC tablet Take 81 mg by mouth daily. Yes Historical Provider, MD     Vitals:    08/11/20 1410   BP: 102/68   Site: Left Upper Arm   Position: Sitting   Cuff Size: Medium Adult   Pulse: 56   Resp: 16   Temp: 97.3 °F (36.3 °C)   Weight: 185 lb (83.9 kg)   Height: 5' 11\" (1.803 m)      Body mass index is 25.8 kg/m². Wt Readings from Last 3 Encounters:   08/11/20 185 lb (83.9 kg)   07/13/20 183 lb 1.6 oz (83.1 kg)   06/22/20 183 lb 11.2 oz (83.3 kg)     Constitutional: Patient is a pleasant tall male appears in no apparent distress. Gained 4 pounds since last visit. Eyes: Pupils are equal in both eyes.  He wears glasses. NECK: No hepatojugular reflux noted.    Cardiovascular:  Auscultation: Normal S1 and S2.  No murmurs or gallops noted. Carotids are negative for bruits.  Abdominal aorta is nonpalpable.  No epigastric bruit noted.   Peripheral pulses: 1+ equal on both sides  Respiratory:  Respiratory effort normal  Breath sounds are clear to auscultation  Extremities: No clubbing,  Cyanosis, petechiae.  Superficial venous telengiectasia noted bilaterally. SKIN: Warm and well perfused, no pallor or cyanosis  Abdomen:  No masses or tenderness. No organomegaly noted. Musculoskeletal:  No significant spinal deformities noted.  Gait is normal  Muscle strength is normal.  Neurologic:  Oriented to time, place, and person and non-anxious. No focal neurological deficit noted. Psychiatric: Normal mood and effect     EKG today is consistent with sinus bradycardia 58 bpm otherwise normal ECG. LAB REVIEW:  CBC:   Lab Results   Component Value Date    WBC 7.9 06/05/2013    HGB 14.9 06/05/2013    HCT 44.3 06/05/2013     06/05/2013     Lipids:   Component      Latest Ref Rng & Units 5/11/2020           9:00 AM   Triglycerides      <150 MG/   Cholesterol      <200 MG/   HDL Cholesterol      >40 MG/DL 36 (L)   LDL Direct      <100 MG/DL 54     Renal:   Lab Results   Component Value Date    BUN 21 05/11/2020    CREATININE 1.2 05/11/2020     05/11/2020    K 4.0 05/11/2020     PT/INR:   Lab Results   Component Value Date    INR 0.96 06/05/2013     IMPRESSION and RECOMMENDATIONS:      Hyperlipidemia  LDL 54 suggesting well-controlled lipid status on current medications. Patient can hold Lipitor for 1-2 weeks to see if his muscle aches are completely gone and call us otherwise continue current medications. PAF (paroxysmal atrial fibrillation) (HCC)  Well controlled on amiodarone 100 mg daily, continue the same. S/P angioplasty with stent  Clinically stable. Shortness of breath on exertion could be angina equivalent. We will evaluate him noninvasively. SOB (shortness of breath)  Stress tests with cardiac imaging and follow-up there is a significant change from previous test.    Hypertension  Well-controlled on current medications continue the same. Continue current cardiovascular medications which have been reviewed and discussed individually with you.   Walk daily for exercise and

## 2020-08-11 NOTE — PATIENT INSTRUCTIONS
Continue current cardiovascular medications which have been reviewed and discussed individually with you. Walk daily for exercise and can hold Lipitor to see if it is responsible for muscle aches and let us know. Primary/secondary prevention is the goal by aggressive risk modification, healthy and therapeutic life style changes for cardiovascular risk reduction. Various goals are discussed and questions answered. Appropriate prescriptions if needed on this visit are addressed. After visit summery is provided. Questions answered and patient verbalizes understanding. Follow up in 6 months,  sooner if needed. Please hold on to these instructions the  will call you within 1-9 business days when we receive authorization from your insurance. Nuclear Stress Test    WHAT TO EXPECT:   ? You will need to confirm the test or it could be cancelled. ? This test will take approximately 2 hours: 1 hour in the AM &    1 hour in the PM. You will be given a time by the Technologist after the first part is completed to come back. ? You will be given a medication, through an IV in the hand, this will safely simulate exercise. This IV is also needed to inject the radioactive isotope unless you are able toe walk the treadmill. ? You will receive an injection in the AM & PM before the pictures. ? Using a special camera, you will have one set of pictures of your heart taken in the AM and a set of pictures in the PM.     PREPARATION FOR TEST:  ? Eat a light breakfast such as water or juice and toast.  ? If you are DIABETIC: Eat a normal breakfast with NO CAFFEINE and take your insulin as normal.   ? AVOID ALL FOODS & DRINKS containing CAFFEINE 24 HOURS PRIOR TO THE TEST: Including coffee, Tea, Pat and other soft drinks even those labeled  caffeine free or decaffeinated.  ? HOLD THESE MEDICATIONS Persantine & Theophylline (Theodur)  24 hours prior & bring your inhaler with you.

## 2020-08-11 NOTE — ASSESSMENT & PLAN NOTE
LDL 54 suggesting well-controlled lipid status on current medications. Patient can hold Lipitor for 1-2 weeks to see if his muscle aches are completely gone and call us otherwise continue current medications.

## 2020-08-11 NOTE — ASSESSMENT & PLAN NOTE
Clinically stable. Shortness of breath on exertion could be angina equivalent. We will evaluate him noninvasively.

## 2020-08-18 ENCOUNTER — PROCEDURE VISIT (OUTPATIENT)
Dept: CARDIOLOGY CLINIC | Age: 80
End: 2020-08-18
Payer: MEDICARE

## 2020-08-18 LAB
LV EF: 56 %
LVEF MODALITY: NORMAL

## 2020-08-18 PROCEDURE — 93016 CV STRESS TEST SUPVJ ONLY: CPT | Performed by: INTERNAL MEDICINE

## 2020-08-18 PROCEDURE — 93017 CV STRESS TEST TRACING ONLY: CPT | Performed by: INTERNAL MEDICINE

## 2020-08-18 PROCEDURE — 78452 HT MUSCLE IMAGE SPECT MULT: CPT | Performed by: INTERNAL MEDICINE

## 2020-08-18 PROCEDURE — 93018 CV STRESS TEST I&R ONLY: CPT | Performed by: INTERNAL MEDICINE

## 2020-08-18 PROCEDURE — A9500 TC99M SESTAMIBI: HCPCS | Performed by: INTERNAL MEDICINE

## 2020-08-20 ENCOUNTER — TELEPHONE (OUTPATIENT)
Dept: CARDIOLOGY CLINIC | Age: 80
End: 2020-08-20

## 2020-11-16 ENCOUNTER — HOSPITAL ENCOUNTER (OUTPATIENT)
Age: 80
Discharge: HOME | End: 2020-11-16
Payer: MEDICARE

## 2020-11-16 LAB — PROSTATE SPECIFIC ANTIGEN: 5.25 NG/ML (ref 0–4)

## 2020-11-16 PROCEDURE — 36415 COLL VENOUS BLD VENIPUNCTURE: CPT

## 2020-11-16 PROCEDURE — G0103 PSA SCREENING: HCPCS

## 2021-01-07 ENCOUNTER — TELEPHONE (OUTPATIENT)
Dept: CARDIOLOGY CLINIC | Age: 81
End: 2021-01-07

## 2021-01-07 NOTE — TELEPHONE ENCOUNTER
Received cardiac clearance request from [facility]  for urolift procedure under local anesthesia. Date of procedure is pending clearance. Patient's info and cardiac clearance form ready for Dr. Ellen Alexandre to review/complete when he returns to this office on Monday 1/11/21.

## 2021-02-07 ENCOUNTER — HOSPITAL ENCOUNTER (EMERGENCY)
Age: 81
Discharge: HOME | End: 2021-02-07
Attending: EMERGENCY MEDICINE
Payer: MEDICARE

## 2021-02-07 VITALS
HEIGHT: 71 IN | SYSTOLIC BLOOD PRESSURE: 128 MMHG | BODY MASS INDEX: 25.2 KG/M2 | OXYGEN SATURATION: 98 % | WEIGHT: 180 LBS | RESPIRATION RATE: 18 BRPM | HEART RATE: 80 BPM | DIASTOLIC BLOOD PRESSURE: 78 MMHG | TEMPERATURE: 98 F

## 2021-02-07 VITALS
BODY MASS INDEX: 25.2 KG/M2 | OXYGEN SATURATION: 97 % | RESPIRATION RATE: 14 BRPM | DIASTOLIC BLOOD PRESSURE: 59 MMHG | SYSTOLIC BLOOD PRESSURE: 122 MMHG | HEART RATE: 87 BPM | WEIGHT: 180 LBS | TEMPERATURE: 97.8 F | HEIGHT: 71 IN

## 2021-02-07 DIAGNOSIS — R33.9 URINARY RETENTION: ICD-10-CM

## 2021-02-07 DIAGNOSIS — R31.0 GROSS HEMATURIA: Primary | ICD-10-CM

## 2021-02-07 LAB
BACTERIA: ABNORMAL /HPF
BILIRUBIN URINE: NEGATIVE MG/DL
BLOOD, URINE: ABNORMAL
CAST TYPE: NEGATIVE /HPF
CLARITY: ABNORMAL
COLOR: ABNORMAL
CRYSTAL TYPE: NEGATIVE /HPF
EPITHELIAL CELLS, UA: ABNORMAL /HPF
GLUCOSE, URINE: NEGATIVE MG/DL
KETONES, URINE: NEGATIVE MG/DL
LEUKOCYTE ESTERASE, URINE: ABNORMAL
NITRITE URINE, QUANTITATIVE: POSITIVE
PH, URINE: 7 (ref 5–8)
PROTEIN UA: 100 MG/DL
RBC URINE: ABNORMAL /HPF (ref 0–3)
SPECIFIC GRAVITY UA: 1.01 (ref 1–1.03)
UROBILINOGEN, URINE: 1 MG/DL (ref 0.2–1)
WBC UA: ABNORMAL /HPF (ref 0–2)

## 2021-02-07 PROCEDURE — 6370000000 HC RX 637 (ALT 250 FOR IP): Performed by: EMERGENCY MEDICINE

## 2021-02-07 PROCEDURE — 99284 EMERGENCY DEPT VISIT MOD MDM: CPT

## 2021-02-07 PROCEDURE — 81001 URINALYSIS AUTO W/SCOPE: CPT

## 2021-02-07 PROCEDURE — 87086 URINE CULTURE/COLONY COUNT: CPT

## 2021-02-07 PROCEDURE — 51798 US URINE CAPACITY MEASURE: CPT

## 2021-02-07 RX ORDER — LIDOCAINE HYDROCHLORIDE 20 MG/ML
JELLY TOPICAL PRN
Status: DISCONTINUED | OUTPATIENT
Start: 2021-02-07 | End: 2021-02-07 | Stop reason: HOSPADM

## 2021-02-07 RX ORDER — HYDROCODONE BITARTRATE AND ACETAMINOPHEN 5; 325 MG/1; MG/1
1 TABLET ORAL ONCE
Status: COMPLETED | OUTPATIENT
Start: 2021-02-07 | End: 2021-02-07

## 2021-02-07 RX ORDER — CIPROFLOXACIN 500 MG/1
500 TABLET, FILM COATED ORAL 2 TIMES DAILY
Qty: 20 TABLET | Refills: 0 | Status: SHIPPED | OUTPATIENT
Start: 2021-02-07 | End: 2021-02-17

## 2021-02-07 RX ORDER — CIPROFLOXACIN 500 MG/1
500 TABLET, FILM COATED ORAL ONCE
Status: COMPLETED | OUTPATIENT
Start: 2021-02-07 | End: 2021-02-07

## 2021-02-07 RX ADMIN — HYDROCODONE BITARTRATE AND ACETAMINOPHEN 1 TABLET: 5; 325 TABLET ORAL at 18:42

## 2021-02-07 RX ADMIN — CIPROFLOXACIN HYDROCHLORIDE 500 MG: 500 TABLET, FILM COATED ORAL at 13:50

## 2021-02-07 ASSESSMENT — PAIN DESCRIPTION - PAIN TYPE: TYPE: ACUTE PAIN

## 2021-02-07 ASSESSMENT — PAIN SCALES - GENERAL
PAINLEVEL_OUTOF10: 5
PAINLEVEL_OUTOF10: 6

## 2021-02-07 NOTE — ED NOTES
Dr. Solano Flank paged per perfect serve. Call transferred to Dr. Renzo Fernando.       Rufino Sever, RN  02/07/21 [id number]

## 2021-02-07 NOTE — ED NOTES
Olson catheter irrigated with 1000 ml of sterile water. 1500 ml drained from olson bag. Pt  Had pressure relief after irrigation.       Veronika Celeste RN  02/07/21 [id number]

## 2021-02-07 NOTE — ED PROVIDER NOTES
Emergency Department Encounter  Location: [place] [facility] [address] [place] [address]    Patient: Luba Fleming  MRN: [id number]  : 1940  Date of evaluation: 2021  ED Provider: Speedy Gambino DO, FACEP    Chief Complaint:    Hematuria (States he came back because his catheter isn't working, says he had to go so bad he had [place] accident\" states \"it just exploded and went around the catheter\")    [place]:  Luba Fleming is a 78 y.o. male that presents to the emergency department for the second time today with hematuria and his Meraz catheter being clogged. The patient was seen earlier today. He had a bladder lift procedure performed on Tuesday by Dr. Sanjuanita Ocasio. I discussed the case with Dr. Shari Adrian who advised me to have a Meraz catheter placed. The largest one that could be passed when he was initially here was a [address] [place] however this catheter became clogged when he got home and he was urinating around it including clots and blood. He returns emergency department with a plugged catheter. He is having discomfort in his penis and pelvic region. ROS:  At least 4 systems reviewed and otherwise acutely negative except as in the [address] Negative for fever or chills  Negative for chest pain  Negative for shortness of breath  Negative for nausea vomiting diarrhea or constipation    Past Medical History:   Diagnosis Date    Arthritis     Back pain, chronic     BPH (benign prostatic hyperplasia)     CAD (coronary artery disease)     stents RCA, Cx, LAD    Cancer ([facility] [address] 12/3/12    skin cancer removed    Family history of coronary artery disease     H/O cardiovascular stress test 17, 20    EF 50-60%. Mild anterior and septal wall ischemia.  H/O Doppler ultrasound 2019    No evidence of DVT or SVT. There appears to be calcified thrombus with in the right small saphenous vein.  Significant reflux noted of the Right CFV 5.0s.    H/O echocardiogram ; 2019 ENT: Tolerates saliva. NECK: Supple. Trachea midline. LUNGS: Respirations unlabored. EXTREMITIES: No acute deformities. Genital exam.  Patient has an indwelling Meraz with bloody urine in his Meraz bag. SKIN: Warm and dry. NEUROLOGICAL: No gross facial drooping. PSYCHIATRIC: Normal mood. Labs:  No results found for this visit on 02/07/21. Radiographs (if obtained):  [] The following radiograph was interpreted by myself in the absence of a radiologist:  [x] Radiologist's Report reviewed at time of ED visit:  No orders to display       ED Course and MDM:  Here in the emergency department the patient has had 4000 L of bladder irrigation through [address] three-way Meraz. He is now draining yellow urine. He is complaining of bladder spasms. He has been given Norco here. I would like to see his urine stay clear for the next half hour or so. If so he will be discharged home. I will turn his care over to the night doc so he can check on him to determine if his pain is under control and if his urine remains clear blood. Final Impression:  1. Gross hematuria    2. Urinary retention      DISPOSITION     Patient referred to: No follow-up provider specified.   Discharge medications:  New Prescriptions    No medications on file     (Please note that portions of this note may have been completed with a voice recognition program. Efforts were made to edit the dictations but occasionally words are mis-transcribed.)    Sugey Petit DO, [facility]  Board certified in [address] [place], [place]  02/07/21 3942 [address]  02/11/21 2797

## 2021-02-07 NOTE — ED PROVIDER NOTES
Emergency Department Encounter  Location: [place] [facility] [address] [place] [address]    Patient: Homer Soto  MRN: [id number]  : 1940  Date of evaluation: 2021  ED Provider: Joshua San DO, FACEP    Chief Complaint:    Dysuria (states (had clots this morning but now can't go. Had blood in urine yesterday. States (had urolift in Dr. Ricardo Awad office on Tuesday) Did not call Dr. Tyra Ruby)    [place]:  Homer Soto is a 78 y.o. male that presents to the emergency department with complaints of urinary retention. The patient states earlier today he urinated a clot of blood. He states since that time he has been going small amounts but his urine has been mostly bloody. He states there have been small clots in his urine. He states that he is continued to have retention and is having some discomfort in his suprapubic region. The patient had a procedure performed last Tuesday by Dr. Tyra Ruby. He had a bladder lift. He states that he has had some pain for that but has not had much in the way bleeding until today. ROS:  At least 4 systems reviewed and otherwise acutely negative except as in the [address] Negative for fever or chills  Negative for chest pain  Negative for shortness of breath  Negative for nausea vomiting diarrhea or constipation    Past Medical History:   Diagnosis Date    Arthritis     Back pain, chronic     BPH (benign prostatic hyperplasia)     CAD (coronary artery disease)     stents RCA, Cx, LAD    Cancer ([address] 12/3/12    skin cancer removed    Family history of coronary artery disease     H/O cardiovascular stress test 17, 20    EF 50-60%. Mild anterior and septal wall ischemia.  H/O Doppler ultrasound 2019    No evidence of DVT or SVT. There appears to be calcified thrombus with in the right small saphenous vein. Significant reflux noted of the Right CFV 5.0s.    H/O echocardiogram ; 2019    EF 50-55%.  Diastolic Dysfunction Grade I . Sclerotic, but non-stenotic aortic valve. Aortic root dimension upper limits of normal 3.7cm.      Hyperlipidemia     Hypertension     Leg swelling 2019    PAF [facility]oxysmal [facility] fibrillation) ([facility])     S/P angioplasty with stent 10/6/03;10/16/03;10/5/04    stent RCA; stent CX; stent LAD  &     SOB (shortness of breath) 2017    Torsade de pointes 2012     Past Surgical History:   Procedure Laterality Date    ABDOMINAL HERNIA REPAIR      CATARACT REMOVAL      CORONARY ANGIOPLASTY WITH STENT PLACEMENT      DIAGNOSTIC CARDIAC CATH LAB PROCEDURE   10/6/03;10/16/03;10/5/04;3/12    PTCA with stents; 3-12 cont med therapy     Family History   Problem Relation Age of Onset    Coronary Art Dis Mother     Hypertension Mother     Heart Attack Mother     Coronary Art Dis Brother      Social History     Socioeconomic History    Marital status:      Spouse name: Not on file    Number of children: Not on file    Years of education: Not on file    Highest education level: Not on file   Occupational History    Not on file   Social Needs    Financial resource strain: Not hard at all   [employer] insecurity     Worry: Never true     Inability: Never true    Transportation needs     Medical: No     Non-medical: No   Tobacco Use    Smoking status: Former Smoker     Years: 25.00     Types: Cigarettes, Pipe     Quit date: 1980     Years since quittin.1    Smokeless tobacco: Never Used   Substance and Sexual Activity    Alcohol use: No     Comment: occassional    Drug use: No    Sexual activity: Yes     Partners: Female     Comment:    Lifestyle    Physical activity     Days per week: Not on file     Minutes per session: Not on file    Stress: Not on file   Relationships    Social connections     Talks on phone: Not on file     Gets together: Not on file     Attends [employer] service: Not on file     Active member of club or organization: Not on file     Attends meetings of clubs or organizations: Not on file     Relationship status: Not on file    Intimate partner violence     Fear of current or ex partner: Not on file     Emotionally abused: Not on file     Physically abused: Not on file     Forced sexual activity: Not on file   Other Topics Concern    Not on file   Social History Narrative    Not on file     Current Facility-Administered Medications   Medication Dose Route Frequency Provider Last Rate Last Admin    lidocaine (XYLOCAINE) 2 % uro-jet   Topical PRN Victor Hugo Whippleamo, DO        ciprofloxacin (CIPRO) tablet 500 mg  500 mg Oral Once Victor Hugo Obregon, DO         Current Outpatient Medications   Medication Sig Dispense Refill    ciprofloxacin (CIPRO) 500 MG tablet Take 1 tablet by mouth 2 times daily for 10 days 20 tablet 0    amiodarone (CORDARONE) 200 MG tablet Take 0.5 tablets by mouth daily 45 tablet 3    atorvastatin (LIPITOR) 40 MG tablet Take 1 tablet by mouth daily 30 tablet 5    amLODIPine-benazepril (LOTREL) 5-20 MG per capsule Take 1 capsule by mouth daily 30 capsule 5    tamsulosin (FLOMAX) 0.4 MG capsule Take 0.4 mg by mouth daily      multivitamin (THERAGRAN) per tablet Take 1 tablet by mouth daily.  DICLOFENAC PO Take 75 mg by mouth 2 times daily as needed      indapamide (LOZOL) 1.25 MG tablet Take 1.25 mg by mouth every morning      aspirin 81 MG EC tablet Take 81 mg by mouth daily. No Known Allergies  Nursing Notes Reviewed    Physical Exam:  ED Triage Vitals [02/07/21 0953]   Enc Vitals Group      BP (!) 144/78      Pulse 83      Resp 16      Temp 98.1 °F (36.7 °C)      Temp Source Oral      SpO2 98 %      Weight 180 lb (81.6 kg)      Height 5' 11\" (1.803 m)      Head Circumference       Peak Flow       Pain Score       Pain Loc       Pain Edu? Excl. in [address]? GENERAL APPEARANCE: Awake and alert. Cooperative. No acute distress. Nontoxic in appearance  HEAD: Normocephalic. Atraumatic. EYES: Sclera anicteric.   ENT: Tolerates saliva. NECK: Supple. Trachea midline. LUNGS: Respirations unlabored. Abdomen: Somewhat tender to palpation in suprapubic region without guarding or rebound. EXTREMITIES: No acute deformities. SKIN: Warm and dry. NEUROLOGICAL: No gross facial drooping. PSYCHIATRIC: Normal mood. Labs:  No results found for this visit on 02/07/21. Radiographs (if obtained):  [] The following radiograph was interpreted by myself in the absence of a radiologist:  [x] Radiologist's Report reviewed at time of ED visit:  No orders to display       ED Course and MDM:  Patient's urine is grossly bloody. I discussed his care with Dr. Anjel Parr who advised me to place a Meraz catheter and irrigate the bladder to clear. We have irrigated approximately 1500 cc and he is still having some blood-tinged urine. The urine is flowing. We will start him on Cipro. He will be discharged in stable condition and is instructed to return if his condition worsens. He is instructed to follow-up with Dr. Stas Moreau in the next 2 days and to return for any problems or concerns in the meantime. He is discharged in stable condition at this time. Urinalysis and urine culture are pending. Final Impression:  1. Gross hematuria    2.  Urinary retention      DISPOSITION Discharge - Pending Orders Complete    Patient referred to:  Luis F Hernandez MD  [address] [place] [address]  [phone]    Schedule an appointment as soon as possible for a visit in 2 days  For follow up    Discharge medications:  New Prescriptions    CIPROFLOXACIN (CIPRO) 500 MG TABLET    Take 1 tablet by mouth 2 times daily for 10 days     (Please note that portions of this note may have been completed with a voice recognition program. Efforts were made to edit the dictations but occasionally words are mis-transcribed.)    Ariana Markham DO, [facility]  Board certified in [address] [place] [address]  02/07/21 1321

## 2021-02-07 NOTE — ED NOTES
Pt educated on leg bag and catheter care. Pt voiced understanding.       Prince Montes RN  02/07/21 9831

## 2021-02-08 NOTE — ED NOTES
Discharge instructions reviewed with pt and verbalizes understanding. Leg bag sent home with pt. Third lumen that was being used to irrigate catheter capped off.      Rahel Grayson RN  02/07/21 2026

## 2021-02-09 LAB
CULTURE: NORMAL
Lab: NORMAL
SPECIMEN: NORMAL

## 2021-03-09 ENCOUNTER — HOSPITAL ENCOUNTER (OUTPATIENT)
Age: 81
Discharge: HOME | End: 2021-03-09
Payer: MEDICARE

## 2021-03-09 LAB
ALBUMIN SERPL-MCNC: 4.1 GM/DL (ref 3.4–5)
ALP BLD-CCNC: 85 IU/L (ref 40–129)
ALT SERPL-CCNC: 14 U/L (ref 10–40)
ANION GAP SERPL CALCULATED.3IONS-SCNC: 7 MMOL/L (ref 4–16)
AST SERPL-CCNC: 19 IU/L (ref 15–37)
BILIRUB SERPL-MCNC: 0.5 MG/DL (ref 0–1)
BUN BLDV-MCNC: 25 MG/DL (ref 6–23)
CALCIUM SERPL-MCNC: 10.1 MG/DL (ref 8.3–10.6)
CHLORIDE BLD-SCNC: 104 MMOL/L (ref 99–110)
CHOLESTEROL, FASTING: 135 MG/DL
CO2: 29 MMOL/L (ref 21–32)
CREAT SERPL-MCNC: 1 MG/DL (ref 0.9–1.3)
GFR AFRICAN AMERICAN: >60 ML/MIN/1.73M2
GFR NON-AFRICAN AMERICAN: >60 ML/MIN/1.73M2
GLUCOSE FASTING: 87 MG/DL (ref 70–99)
HDLC SERPL-MCNC: 31 MG/DL
LDL CHOLESTEROL DIRECT: 94 MG/DL
POTASSIUM SERPL-SCNC: 4.7 MMOL/L (ref 3.5–5.1)
SODIUM BLD-SCNC: 140 MMOL/L (ref 135–145)
TOTAL PROTEIN: 6.8 GM/DL (ref 6.4–8.2)
TRIGLYCERIDE, FASTING: 91 MG/DL
TSH HIGH SENSITIVITY: 1.47 UIU/ML (ref 0.27–4.2)

## 2021-03-09 PROCEDURE — 36415 COLL VENOUS BLD VENIPUNCTURE: CPT

## 2021-03-09 PROCEDURE — 84443 ASSAY THYROID STIM HORMONE: CPT

## 2021-03-09 PROCEDURE — 80061 LIPID PANEL: CPT

## 2021-03-09 PROCEDURE — 80053 COMPREHEN METABOLIC PANEL: CPT

## 2021-03-11 NOTE — TELEPHONE ENCOUNTER
I have been out of the office since 1/11/2021. This clearance was taken care of per Christine Miner MA on 1/11/2021; closing this encounter.

## 2021-03-15 ENCOUNTER — OFFICE VISIT (OUTPATIENT)
Dept: CARDIOLOGY CLINIC | Age: 81
End: 2021-03-15
Payer: MEDICARE

## 2021-03-15 VITALS
HEART RATE: 58 BPM | SYSTOLIC BLOOD PRESSURE: 128 MMHG | WEIGHT: 182 LBS | HEIGHT: 71 IN | TEMPERATURE: 97 F | DIASTOLIC BLOOD PRESSURE: 76 MMHG | BODY MASS INDEX: 25.48 KG/M2

## 2021-03-15 DIAGNOSIS — I48.0 PAF (PAROXYSMAL ATRIAL FIBRILLATION) (HCC): ICD-10-CM

## 2021-03-15 DIAGNOSIS — Z79.899 ENCOUNTER FOR MONITORING AMIODARONE THERAPY: ICD-10-CM

## 2021-03-15 DIAGNOSIS — I10 ESSENTIAL HYPERTENSION: ICD-10-CM

## 2021-03-15 DIAGNOSIS — E78.00 PURE HYPERCHOLESTEROLEMIA: ICD-10-CM

## 2021-03-15 DIAGNOSIS — R06.02 SOB (SHORTNESS OF BREATH): ICD-10-CM

## 2021-03-15 DIAGNOSIS — Z51.81 ENCOUNTER FOR MONITORING AMIODARONE THERAPY: ICD-10-CM

## 2021-03-15 DIAGNOSIS — Z95.820 S/P ANGIOPLASTY WITH STENT: ICD-10-CM

## 2021-03-15 DIAGNOSIS — I49.9 IRREGULAR HEART BEAT: Primary | ICD-10-CM

## 2021-03-15 PROCEDURE — G8484 FLU IMMUNIZE NO ADMIN: HCPCS | Performed by: INTERNAL MEDICINE

## 2021-03-15 PROCEDURE — 1036F TOBACCO NON-USER: CPT | Performed by: INTERNAL MEDICINE

## 2021-03-15 PROCEDURE — 1123F ACP DISCUSS/DSCN MKR DOCD: CPT | Performed by: INTERNAL MEDICINE

## 2021-03-15 PROCEDURE — 93000 ELECTROCARDIOGRAM COMPLETE: CPT | Performed by: INTERNAL MEDICINE

## 2021-03-15 PROCEDURE — G8427 DOCREV CUR MEDS BY ELIG CLIN: HCPCS | Performed by: INTERNAL MEDICINE

## 2021-03-15 PROCEDURE — 99214 OFFICE O/P EST MOD 30 MIN: CPT | Performed by: INTERNAL MEDICINE

## 2021-03-15 PROCEDURE — 4040F PNEUMOC VAC/ADMIN/RCVD: CPT | Performed by: INTERNAL MEDICINE

## 2021-03-15 PROCEDURE — G8417 CALC BMI ABV UP PARAM F/U: HCPCS | Performed by: INTERNAL MEDICINE

## 2021-03-15 NOTE — PROGRESS NOTES
Family history of coronary artery disease     H/O cardiovascular stress test 6/6/17, 8/18/20    EF 50-60%. Mild anterior and septal wall ischemia.  H/O Doppler ultrasound 08/13/2019    No evidence of DVT or SVT. There appears to be calcified thrombus with in the right small saphenous vein. Significant reflux noted of the Right CFV 5.0s.    H/O echocardiogram 1/12; 8/13/2019    EF 50-55%. Diastolic Dysfunction Grade I . Sclerotic, but non-stenotic aortic valve. Aortic root dimension upper limits of normal 3.7cm.  Hyperlipidemia     Hypertension     Leg swelling 8/5/2019    [facility] fibrillation[facility] ([facility])     S/P angioplasty with stent 10/6/03;10/16/03;10/5/04    stent RCA; stent CX; stent LAD 2003 & 2004    SOB (shortness of breath) 6/6/2017    Torsade de pointes 1/8/2012      Vitals:    03/15/21 0915   BP: 128/76   Pulse: 58   Temp: 97 °F (36.1 °C)   Weight: 182 lb (82.6 kg)   Height: 5' 11\" (1.803 m)      Body mass index is 25.38 kg/m². Wt Readings from Last 3 Encounters:   03/15/21 182 lb (82.6 kg)   02/07/21 180 lb (81.6 kg)   02/07/21 180 lb (81.6 kg)     Constitutional: Patient is a pleasant tall male appears in no apparent distress. He lost 3 pounds since last visit on August 11, 2020  Eyes: Pupils are equal in both eyes.  He wears glasses and facemask. NECK: No hepatojugular reflux noted.    Cardiovascular:  Auscultation: Normal S1 and S2.  No murmurs or gallops noted. Carotids are negative for bruits.  Abdominal aorta is nonpalpable.  No epigastric bruit noted. Peripheral pulses: 1+ equal on both sides  Respiratory:  Respiratory effort normal  Breath sounds are clear to auscultation  Extremities: No clubbing,  Cyanosis, petechiae.  Superficial venous telengiectasia noted bilaterally. SKIN: Warm and well perfused, no pallor or cyanosis  Abdomen:  No masses or tenderness. No organomegaly noted.   Musculoskeletal:  No significant spinal deformities noted.  Gait is normal  Muscle strength is normal.  Neurologic:  Oriented to time, place, and person and non-anxious. No focal neurological deficit     EKG today is consistent with sinus rhythm 58 bpm QTC is 419 ms. Pertinent records reviewed and discussed with patient and results are as follow:    Lab Results   Component Value Date    WBC 7.9 06/05/2013    HGB 14.9 06/05/2013    HCT 44.3 06/05/2013     06/05/2013     Lab Results   Component Value Date    CHOL 101 05/11/2020    CHOLFAST 135 03/09/2021    TRIG 101 05/11/2020    TRIGLYCFAST 91 03/09/2021    HDL 31 (L) 03/09/2021    LDLCALC 71 05/29/2014    LDLDIRECT 94 03/09/2021   ALT14 10 - 40 U/EWrrio4903/09/2021 10:10 [address] AST19 15 - 37 IU/YZgiyu2203/09/2021 10:10 [address] Alkaline [id number]   TSH, High Sensitivity1.470     Lab Results   Component Value Date    BUN 25 03/09/2021    CREATININE 1.0 03/09/2021     03/09/2021    K 4.7 03/09/2021     Lab Results   Component Value Date    INR 0.96 06/05/2013     ASSESSMENT/PLAN:    1. Irregular heart beat  -     EKG 12 Lead; Standing  2. CAD S/P PCI with stents Cx+LAD 2003,2004  Assessment & Plan:  Clinically stable. Continue aggressive risk factor modification for secondary prevention. 3. Pure hypercholesterolemia  Assessment & Plan:  LDL recently reported 80 which is higher than last year reported 47. Patient believes because he was not exercising regularly. He is back on the track with exercise and we will repeat it again prior to next office visit. LDL goal is less than 70. Continue Lipitor 40 mg daily. Orders:  -     Lipid Panel; Future  4. PAF (paroxysmal atrial fibrillation) (HCC)  Assessment & Plan:  Well-controlled on amiodarone therapy which she is tolerating it well. Continue the same. 5. SOB (shortness of breath)  Assessment & Plan:  Clinically stable. Continue current medical management and exercise regularly.   6. Essential hypertension  Assessment & Plan: Well-controlled on current medications continue the same. 7. Encounter for monitoring amiodarone therapy  Assessment & Plan:  QTC today reported 419 suggesting he is tolerating it well. His liver function test TSH and renal functions are also within normal limits. We will continue to monitor closely. Continue current cardiovascular medications which have been reviewed and discussed individually with you. Continue to exercise as tolerated. Primary/secondary prevention is the goal by aggressive risk modification, healthy and therapeutic life style changes for cardiovascular risk reduction. Various goals are discussed and questions answered. Follow-up in 6 months, sooner if needed. An electronic signature was used to authenticate this note.     --Sheela Braden MD

## 2021-03-15 NOTE — PATIENT INSTRUCTIONS
Continue current cardiovascular medications which have been reviewed and discussed individually with you. Continue to exercise as tolerated. Primary/secondary prevention is the goal by aggressive risk modification, healthy and therapeutic life style changes for cardiovascular risk reduction. Various goals are discussed and questions answered. Follow-up in 6 months, sooner if needed.

## 2021-03-15 NOTE — ASSESSMENT & PLAN NOTE
Clinically stable. Continue current medical management and exercise regularly.
LDL recently reported 80 which is higher than last year reported 47. Patient believes because he was not exercising regularly. He is back on the track with exercise and we will repeat it again prior to next office visit. LDL goal is less than 70. Continue Lipitor 40 mg daily.
QTC today reported 419 suggesting he is tolerating it well. His liver function test TSH and renal functions are also within normal limits. We will continue to monitor closely.
Well-controlled on amiodarone therapy which she is tolerating it well. Continue the same.
Well-controlled on current medications continue the same.
01-Dec-2018

## 2021-04-23 RX ORDER — ATORVASTATIN CALCIUM 40 MG/1
40 TABLET, FILM COATED ORAL DAILY
Qty: 30 TABLET | Refills: 5 | Status: SHIPPED | OUTPATIENT
Start: 2021-04-23 | End: 2021-08-31 | Stop reason: SDUPTHER

## 2021-08-27 ENCOUNTER — HOSPITAL ENCOUNTER (OUTPATIENT)
Age: 81
Discharge: HOME | End: 2021-08-27
Payer: MEDICARE

## 2021-08-27 LAB
CHOLESTEROL, FASTING: 108 MG/DL
HDLC SERPL-MCNC: 37 MG/DL
LDL CHOLESTEROL DIRECT: 58 MG/DL
TRIGLYCERIDE, FASTING: 104 MG/DL

## 2021-08-27 PROCEDURE — 80061 LIPID PANEL: CPT

## 2021-08-27 PROCEDURE — 36415 COLL VENOUS BLD VENIPUNCTURE: CPT

## 2021-08-31 ENCOUNTER — OFFICE VISIT (OUTPATIENT)
Dept: CARDIOLOGY CLINIC | Age: 81
End: 2021-08-31
Payer: MEDICARE

## 2021-08-31 VITALS
HEIGHT: 71 IN | SYSTOLIC BLOOD PRESSURE: 128 MMHG | BODY MASS INDEX: 25.48 KG/M2 | HEART RATE: 80 BPM | WEIGHT: 182 LBS | DIASTOLIC BLOOD PRESSURE: 70 MMHG

## 2021-08-31 DIAGNOSIS — Z51.81 ENCOUNTER FOR MONITORING AMIODARONE THERAPY: ICD-10-CM

## 2021-08-31 DIAGNOSIS — I49.9 IRREGULAR HEART BEAT: ICD-10-CM

## 2021-08-31 DIAGNOSIS — Z95.820 S/P ANGIOPLASTY WITH STENT: Primary | ICD-10-CM

## 2021-08-31 DIAGNOSIS — I48.0 PAF (PAROXYSMAL ATRIAL FIBRILLATION) (HCC): ICD-10-CM

## 2021-08-31 DIAGNOSIS — Z79.899 ENCOUNTER FOR MONITORING AMIODARONE THERAPY: ICD-10-CM

## 2021-08-31 DIAGNOSIS — I10 ESSENTIAL HYPERTENSION: ICD-10-CM

## 2021-08-31 DIAGNOSIS — E78.00 PURE HYPERCHOLESTEROLEMIA: ICD-10-CM

## 2021-08-31 PROCEDURE — 93000 ELECTROCARDIOGRAM COMPLETE: CPT | Performed by: INTERNAL MEDICINE

## 2021-08-31 PROCEDURE — G8417 CALC BMI ABV UP PARAM F/U: HCPCS | Performed by: INTERNAL MEDICINE

## 2021-08-31 PROCEDURE — G8427 DOCREV CUR MEDS BY ELIG CLIN: HCPCS | Performed by: INTERNAL MEDICINE

## 2021-08-31 PROCEDURE — 99214 OFFICE O/P EST MOD 30 MIN: CPT | Performed by: INTERNAL MEDICINE

## 2021-08-31 PROCEDURE — 1123F ACP DISCUSS/DSCN MKR DOCD: CPT | Performed by: INTERNAL MEDICINE

## 2021-08-31 PROCEDURE — 1036F TOBACCO NON-USER: CPT | Performed by: INTERNAL MEDICINE

## 2021-08-31 PROCEDURE — 4040F PNEUMOC VAC/ADMIN/RCVD: CPT | Performed by: INTERNAL MEDICINE

## 2021-08-31 RX ORDER — AMLODIPINE BESYLATE AND BENAZEPRIL HYDROCHLORIDE 5; 20 MG/1; MG/1
1 CAPSULE ORAL DAILY
Qty: 30 CAPSULE | Refills: 5 | Status: SHIPPED | OUTPATIENT
Start: 2021-08-31 | End: 2022-06-06 | Stop reason: SDUPTHER

## 2021-08-31 RX ORDER — AMIODARONE HYDROCHLORIDE 200 MG/1
100 TABLET ORAL DAILY
Qty: 45 TABLET | Refills: 3 | Status: SHIPPED | OUTPATIENT
Start: 2021-08-31 | End: 2022-06-21

## 2021-08-31 RX ORDER — ATORVASTATIN CALCIUM 40 MG/1
40 TABLET, FILM COATED ORAL DAILY
Qty: 90 TABLET | Refills: 3 | Status: SHIPPED | OUTPATIENT
Start: 2021-08-31 | End: 2022-06-06 | Stop reason: SDUPTHER

## 2021-08-31 NOTE — PATIENT INSTRUCTIONS
Continue current cardiovascular medications which have been reviewed and discussed individually with you. Continue to exercise regularly. Primary/secondary prevention is the goal by aggressive risk modification, healthy and therapeutic life style changes for cardiovascular risk reduction. Various goals are discussed and questions answered. Follow-up in 6 months with EKG, sooner if needed.

## 2021-08-31 NOTE — PROGRESS NOTES
Alena Lucio (:  1940) is a 78 y.o. male,     Chief Complaint   Patient presents with    Atrial Fibrillation     OV for 6 month check. Pt denies any chest pain,dizziness,swelling to ankles,some SOB and palpitations.  Coronary Artery Disease    Hypertension    6 Month Follow-Up     Patient is here for follow up for coronary disease, history of paroxysmal atrial fibrillation on amiodarone therapy and has hypertension and hyperlipidemia. He denies any cardiac symptoms. He exercises regularly goes to [facility] 3 times a week. He is fully vaccinated. Medications are reviewed and he is compliant to his medications. No Known Allergies  Prior to Admission medications    Medication Sig Start Date End Date Taking? Authorizing Provider   amiodarone (CORDARONE) 200 MG tablet Take 0.5 tablets by mouth daily 21  Yes Azael Raya MD   amLODIPine-benazepril (LOTREL) 5-20 MG per capsule Take 1 capsule by mouth daily 21  Yes Azael Raya MD   atorvastatin (LIPITOR) 40 MG tablet Take 1 tablet by mouth daily 21  Yes Azael Raya MD   indapamide (LOZOL) 1.25 MG tablet Take 1.25 mg by mouth every morning   Yes Historical Provider, MD   multivitamin [facility]) per tablet Take 1 tablet by mouth daily. Yes Historical Provider, MD   aspirin 81 MG EC tablet Take 81 mg by mouth daily. Yes Historical Provider, MD     Past Medical History:   Diagnosis Date    Arthritis     Back pain, chronic     BPH (benign prostatic hyperplasia)     CAD (coronary artery disease)     stents RCA, Cx, LAD    Cancer ([facility] [address] 12/3/12    skin cancer removed    Family history of coronary artery disease     H/O cardiovascular stress test 17, 20    EF 50-60%. Mild anterior and septal wall ischemia.  H/O Doppler ultrasound 2019    No evidence of DVT or SVT. There appears to be calcified thrombus with in the right small saphenous vein.  Significant reflux noted of the Right CFV 5.0s.    H/O echocardiogram 1/12; 8/13/2019    EF 50-55%. Diastolic Dysfunction Grade I . Sclerotic, but non-stenotic aortic valve. Aortic root dimension upper limits of normal 3.7cm.  Hyperlipidemia     Hypertension     Leg swelling 8/5/2019    [facility] ([facility])     S/P angioplasty with stent 10/6/03;10/16/03;10/5/04    stent RCA; stent CX; stent LAD 2003 & 2004    SOB (shortness of breath) 6/6/2017    Torsade de pointes 1/8/2012      Vitals:    08/31/21 0858   BP: 128/70   Pulse: 80   Weight: 182 lb (82.6 kg)   Height: 5' 11\" (1.803 m)      Body mass index is 25.38 kg/m². Wt Readings from Last 3 Encounters:   08/31/21 182 lb (82.6 kg)   03/15/21 182 lb (82.6 kg)   02/07/21 180 lb (81.6 kg)     Constitutional: Patient is a pleasant tall male appears in no apparent distress.    He lost 3 pounds since last visit on August 11, 2020  Eyes: Pupils are equal in both eyes.  He wears glasses and facemask. NECK: No hepatojugular reflux noted.    Cardiovascular:  Auscultation: Normal S1 and S2.  No murmurs or gallops noted. Carotids are negative for bruits.  Abdominal aorta is nonpalpable.  No epigastric bruit noted. Peripheral pulses: 1+ equal on both sides  Respiratory:  Respiratory effort normal  Breath sounds are clear to auscultation  Extremities: No clubbing,  Cyanosis, petechiae.  Superficial venous telengiectasia noted bilaterally. SKIN: Warm and well perfused, no pallor or cyanosis  Abdomen:  No masses or tenderness. No organomegaly noted. Musculoskeletal:  No significant spinal deformities noted.  Gait is normal  Muscle strength is normal.  Neurologic:  Oriented to time, place, and person and non-anxious. No focal neurological deficit. Fine bilateral hand tremors noted.      Pertinent records reviewed and discussed with patient and results are as follow:    Lab Results   Component Value Date    WBC 7.9 06/05/2013    HGB 14.9 06/05/2013    HCT 44.3 06/05/2013     06/05/2013     Lab Results   Component Value Date    CHOL 101 05/11/2020    CHOLFAST 108 08/27/2021    TRIG 101 05/11/2020    TRIGLYCFAST 104 08/27/2021    HDL 37 (L) 08/27/2021    LDLCALC 71 05/29/2014    LDLDIRECT 58 08/27/2021     Lab Results   Component Value Date    BUN 25 03/09/2021    CREATININE 1.0 03/09/2021     03/09/2021    K 4.7 03/09/2021     Lab Results   Component Value Date    INR 0.96 06/05/2013     ASSESSMENT/PLAN:    1. CAD S/P PCI with stents Cx+LAD 2828,2636  2. Irregular heart beat  -     EKG 12 Lead  3. Pure hypercholesterolemia  4. PAF (paroxysmal atrial fibrillation) ([address]  5. Essential hypertension  6. Encounter for monitoring amiodarone therapy    Continue current cardiovascular medications which have been reviewed and discussed individually with you. Continue to exercise regularly. Primary/secondary prevention is the goal by aggressive risk modification, healthy and therapeutic life style changes for cardiovascular risk reduction. Various goals are discussed and questions answered. Follow-up in 6 months with EKG, sooner if needed. An electronic signature was used to authenticate this note.     --Leelee Ortiz MD

## 2022-01-13 NOTE — ED NOTES
CBI still flowing without issues yellow fluid draining out.  Dr Blue Niño updated will continue to monitor     Baron Miranda, RN  02/07/21 427 [address], RN  02/07/21 [address], RN  02/07/21 1800 Daily Note     Today's date: 2022  Patient name: Ricardo Norman  : 1953  MRN: [id number]  Referring provider: JANET Hercules  Dx:   Encounter Diagnosis     ICD-10-CM    1  Strain of neck muscle, subsequent encounter  S16  1XXD                   Subjective: patient reports radicular symptoms this morning into L hand (all digits) and L scapula medial border  Objective: See treatment diary below      Assessment: Added mechanical traction vs manual cervical traction this visit which abolished radicular symptoms instantly  Verbalized that he had no contraindications to modality  Able to perform banded exercises w/o increasing radicular symptoms; mild tactile cues to avoid UT compensation  Plan: Continue with current POC to address pt deficits        Precautions: type 2 diabetes, HTN    DATES:  1/11 1/13 12/15 12/17 12/21 12/23 12/28 12/30 1/4 1/5   Manuals             STM  TB L UT TB L UT   L UT L UT   TB TB L UT   Mobs (possible upglides)       L sided upglides at C5-7 L sided upglides at C5-7     Manual Cervical Traction TB mechanica traction 10 min 13lbs TB ES TB TB MARK ES ES TB TB   Manual UT stretch  TB            Manual NG TB  TB median and ulnar  median, ulnar: TB median, ulnar : TB median/ulnar  MARK  Median and ulnar L, ES  median and unlar L, TB median and ulnar L: TB   Neuro Re-Ed             Wall clocks    2x5 yellow 2x5 yellow 2x5 yellow  2x5 yellow  b/l 2x5 red  2x5 red 2x5 red     ITY             Ulnar NG  x20   x10 L     x10 radic to elbow   x10 radic to shoulder   Median NG  x20 90 deg abd, elbow ext, wrist ext   x10 L      x10 L no change   cerv retractions          trialed-p! 3x +technique   DNF 2x10 gentle 20x seated  2x10 5s 2x10 5s 2x10 5s      15x then increased in radic   C/s banded pulls   15x fwd yellow 15x fwd yellow, 10x diagonal b/l yellow 15x fwd yellow, 10x diagonal b/l yellow 20x fwd YTB    10x b/l  YTB 20x fwd red, 10x b/l red      LT pressdown       10x 5s LT wall lift        2x10 b/l red                    Ther Ex             Pt edu  Reviewed contras to mechanical traction, symptom measurement t/o   FOTO, current goals/difficulties    assessment, progress, HEP updated  Ulnar NG HEP creation and instruction, symptom measurement t/o tx, benefits of MH/NG   UBE 2 min fwd then increased radic (consider holding NV) held   2 min/2 min 3'/3' 2/2 min 3/3 min 3/3 min  3/1 min    rows 2x10 orange  25x orange green 3x10 green 2x10 Green x10 then orange 2x10 Orange 2x10  25x orange 25x orange    ext 2x10 green w/palms up  25x green w/palms up  Yellow 3x10 yellow 2x10 Green 3x10 Green   2x15 2x10 green w/ palms up 2x10 green w/ palms up 2x10 green w/palms up      B/l ER 2x10 red supine (consider increasing NV) 2x10 red supine 2x10 yellow standing 2x10 yellow standing 2x10 ytb standing  2x15 ytb standing 2x10 red seated 2x10 red seated 2x10 red seated (consider increasing resistance NV)     UT stretch  10s x10 10x 10s        10s x3 L    horiz abd 2x10 red supine  2x10 supine 2x10 red supine 2x10 yellow standing 2x10 yellow standing  2x15 ytb standing 2x10 red seated 2x10 red seated 2x10 red seated     T/s ext 20x5" 10x then increase in radic 20x 5s 20x 5s 20x 5s  20x 5"     20x 5"    C/s SNAGs    10x to L  10x b/l  10x b/l  10"x10 b/l  15x b/l 15x b/l  15x b/l    Tricep pressdown   25x red  25x red 20x yelllow bl   20x green L       Tricep -  or kick back      5# b/l       Weighted carries for  3 lap 10lbs L        3 laps 10lbs L 3 laps 10lbs L +bottoms up 5lbs 2 laps                  Ther Activity                                       Gait Training                                       Modalities             MH          8 min +pt ed    HEP: [id number]

## 2022-03-01 ENCOUNTER — OFFICE VISIT (OUTPATIENT)
Dept: CARDIOLOGY CLINIC | Age: 82
End: 2022-03-01
Payer: MEDICARE

## 2022-03-01 VITALS
SYSTOLIC BLOOD PRESSURE: 128 MMHG | HEART RATE: 62 BPM | DIASTOLIC BLOOD PRESSURE: 72 MMHG | WEIGHT: 180 LBS | HEIGHT: 72 IN | BODY MASS INDEX: 24.38 KG/M2

## 2022-03-01 DIAGNOSIS — I10 PRIMARY HYPERTENSION: ICD-10-CM

## 2022-03-01 DIAGNOSIS — I48.0 PAF (PAROXYSMAL ATRIAL FIBRILLATION) (HCC): Primary | ICD-10-CM

## 2022-03-01 DIAGNOSIS — E78.00 PURE HYPERCHOLESTEROLEMIA: ICD-10-CM

## 2022-03-01 DIAGNOSIS — Z95.820 S/P ANGIOPLASTY WITH STENT: ICD-10-CM

## 2022-03-01 PROCEDURE — 1123F ACP DISCUSS/DSCN MKR DOCD: CPT | Performed by: INTERNAL MEDICINE

## 2022-03-01 PROCEDURE — 99214 OFFICE O/P EST MOD 30 MIN: CPT | Performed by: INTERNAL MEDICINE

## 2022-03-01 PROCEDURE — G8427 DOCREV CUR MEDS BY ELIG CLIN: HCPCS | Performed by: INTERNAL MEDICINE

## 2022-03-01 PROCEDURE — 1036F TOBACCO NON-USER: CPT | Performed by: INTERNAL MEDICINE

## 2022-03-01 PROCEDURE — 4040F PNEUMOC VAC/ADMIN/RCVD: CPT | Performed by: INTERNAL MEDICINE

## 2022-03-01 PROCEDURE — G8484 FLU IMMUNIZE NO ADMIN: HCPCS | Performed by: INTERNAL MEDICINE

## 2022-03-01 PROCEDURE — 93000 ELECTROCARDIOGRAM COMPLETE: CPT | Performed by: INTERNAL MEDICINE

## 2022-03-01 PROCEDURE — G8420 CALC BMI NORM PARAMETERS: HCPCS | Performed by: INTERNAL MEDICINE

## 2022-03-01 NOTE — PROGRESS NOTES
Issa Spence (:  1940) is a 78 y.o. male,     Chief Complaint   Patient presents with    Coronary Artery Disease     Denies CP, SOB, Palpitations, Edema, and Dizziness.  Hyperlipidemia    Hypertension     Patient is here for follow up for pulmonary artery disease, hypertension hyperlipidemia and history of paroxysmal atrial fibrillation well-controlled on amiodarone therapy for the last 10 years. He denies any palpitations. He denies any chest pains. He started exercising again denies any claudications. He is compliant with his medications except he is not taking aspirin every day. No Known Allergies  Prior to Admission medications    Medication Sig Start Date End Date Taking? Authorizing Provider   amiodarone (CORDARONE) 200 MG tablet Take 0.5 tablets by mouth daily 21  Yes Janette Ch MD   amLODIPine-benazepril (LOTREL) 5-20 MG per capsule Take 1 capsule by mouth daily 21  Yes Janette Ch MD   atorvastatin (LIPITOR) 40 MG tablet Take 1 tablet by mouth daily 21  Yes Janette Ch MD   indapamide (LOZOL) 1.25 MG tablet Take 1.25 mg by mouth every morning   Yes Historical Provider, MD   multivitamin [facility]) per tablet Take 1 tablet by mouth daily. Yes Historical Provider, MD   aspirin 81 MG EC tablet Take 81 mg by mouth daily. Patient not taking: Reported on 3/1/2022    Historical Provider, MD     Past Medical History:   Diagnosis Date    Arthritis     Back pain, chronic     BPH (benign prostatic hyperplasia)     CAD (coronary artery disease)     stents RCA, Cx, LAD    Cancer ([facility] [address] 12/3/12    skin cancer removed    Family history of coronary artery disease     H/O cardiovascular stress test 17, 20    EF 50-60%. Mild anterior and septal wall ischemia.  H/O Doppler ultrasound 2019    No evidence of DVT or SVT. There appears to be calcified thrombus with in the right small saphenous vein.  Significant reflux noted of the Right CFV 5. 0s.    H/O echocardiogram 1/12; 8/13/2019    EF 50-55%. Diastolic Dysfunction Grade I . Sclerotic, but non-stenotic aortic valve. Aortic root dimension upper limits of normal 3.7cm.  Hyperlipidemia     Hypertension     Leg swelling 8/5/2019    PAF [facility]paroxysmal [facility] fibrillation) ([facility])     S/P angioplasty with stent 10/6/03;10/16/03;10/5/04    stent RCA; stent CX; stent LAD 2003 & 2004    SOB (shortness of breath) 6/6/2017    Torsade de pointes 1/8/2012      Vitals:    03/01/22 0858   BP: 128/72   Pulse: 62   Weight: 180 lb (81.6 kg)   Height: 6' (1.829 m)      Body mass index is 24.41 kg/m². Wt Readings from Last 3 Encounters:   03/01/22 180 lb (81.6 kg)   08/31/21 182 lb (82.6 kg)   03/15/21 182 lb (82.6 kg)     Constitutional: Patient is a pleasant tall male appears in no apparent distress.    He lost 2 pounds since last visit on August 11, 2021  Eyes: Pupils are equal in both eyes.  He wears glasses and facemask. NECK: No hepatojugular reflux noted.    Cardiovascular:  Auscultation: Normal S1 and S2.  No murmurs or gallops noted. Carotids are negative for bruits.  Abdominal aorta is nonpalpable.  No epigastric bruit noted. Peripheral pulses: 1+ equal on both sides  Respiratory:  Respiratory effort normal.  Breath sounds are clear to auscultation  Extremities: No clubbing,  Cyanosis, petechiae.  Superficial venous telengiectasia noted bilaterally. SKIN: Warm and well perfused, no pallor or cyanosis  Abdomen:  No masses or tenderness. No organomegaly noted. Musculoskeletal:  No significant spinal deformities noted.  Gait is normal  Muscle strength is normal.  Neurologic:  Oriented to time, place, and person and non-anxious. No focal neurological deficit. Fine bilateral hand tremors noted    EKG today is consistent with normal sinus rhythm 62 bpm.  QTc is 397 ms.     Pertinent records reviewed and discussed with patient and results are as follow:    Lab Results   Component Value Date    WBC 7.9 06/05/2013    HGB 14.9 06/05/2013    HCT 44.3 06/05/2013     06/05/2013     Lab Results   Component Value Date    CHOL 101 05/11/2020    CHOLFAST 108 08/27/2021    TRIG 101 05/11/2020    TRIGLYCFAST 104 08/27/2021    HDL 37 (L) 08/27/2021    LDLCALC 71 05/29/2014    LDLDIRECT 58 08/27/2021     Lab Results   Component Value Date    BUN 25 03/09/2021    CREATININE 1.0 03/09/2021     03/09/2021    K 4.7 03/09/2021     Lab Results   Component Value Date    INR 0.96 06/05/2013     No results found for: LABA1C  ASSESSMENT/PLAN:    1. PAF (paroxysmal atrial fibrillation) (HCC)  Assessment & Plan:  Well controlled on amiodarone 100 mg daily, continue the same. [id number] Score for Atrial Fibrillation Stroke Risk is 3. We discussed anticoagulation therapy for primary prevention of embolic stroke however he has not had any episodes over the last 10 years and he states he can tell when he is in atrial fibrillation and would rather just take aspirin daily for now. 2. Pure hypercholesterolemia  Assessment & Plan:  Last LDL was 58 August last year. Continue atorvastatin 40 mg daily. 3. Primary hypertension  Assessment & Plan:  Well-controlled on the amlodipine and Lozol. Continue both. 4. CAD S/P PCI with stents Cx+LAD 2003,2004  Assessment & Plan:  Clinically stable. Continue aggressive risk factor modification for secondary prevention. Last nuclear stress test was in 2020 was abnormal however patient is asymptomatic we will continue guideline directed medical therapy. Continue current cardiovascular medications which have been reviewed and discussed individually with you. Continue to exercise daily. Primary/secondary prevention is the goal by aggressive risk modification, healthy and therapeutic life style changes for cardiovascular risk reduction. Various goals are discussed and questions answered. Follow-up in 6 months with EKG, sooner if needed.           An electronic signature was used to authenticate this note.     --Tonia Medina MD

## 2022-03-01 NOTE — ASSESSMENT & PLAN NOTE
Clinically stable. Continue aggressive risk factor modification for secondary prevention. Last nuclear stress test was in 2020 was abnormal however patient is asymptomatic we will continue guideline directed medical therapy.

## 2022-03-01 NOTE — ASSESSMENT & PLAN NOTE
Well controlled on amiodarone 100 mg daily, continue the same. [id number] Score for Atrial Fibrillation Stroke Risk is 3. We discussed anticoagulation therapy for primary prevention of embolic stroke however he has not had any episodes over the last 10 years and he states he can tell when he is in atrial fibrillation and would rather just take aspirin daily for now.

## 2022-03-30 ENCOUNTER — HOSPITAL ENCOUNTER (OUTPATIENT)
Age: 82
Discharge: HOME | End: 2022-03-30
Payer: MEDICARE

## 2022-03-30 LAB — PROSTATE SPECIFIC ANTIGEN: 4.9 NG/ML (ref 0–4)

## 2022-03-30 PROCEDURE — G0103 PSA SCREENING: HCPCS

## 2022-03-30 PROCEDURE — 36415 COLL VENOUS BLD VENIPUNCTURE: CPT

## 2022-04-13 ENCOUNTER — HOSPITAL ENCOUNTER (EMERGENCY)
Age: 82
Discharge: HOME | End: 2022-04-13
Attending: EMERGENCY MEDICINE
Payer: MEDICARE

## 2022-04-13 ENCOUNTER — APPOINTMENT (OUTPATIENT)
Dept: GENERAL RADIOLOGY | Age: 82
End: 2022-04-13
Payer: MEDICARE

## 2022-04-13 VITALS
SYSTOLIC BLOOD PRESSURE: 142 MMHG | RESPIRATION RATE: 13 BRPM | TEMPERATURE: 98 F | DIASTOLIC BLOOD PRESSURE: 75 MMHG | HEART RATE: 62 BPM | BODY MASS INDEX: 25.2 KG/M2 | OXYGEN SATURATION: 99 % | HEIGHT: 71 IN | WEIGHT: 180 LBS

## 2022-04-13 DIAGNOSIS — R00.2 PALPITATIONS: Primary | ICD-10-CM

## 2022-04-13 LAB
ALBUMIN SERPL-MCNC: 3.8 GM/DL (ref 3.4–5)
ALP BLD-CCNC: 103 IU/L (ref 40–129)
ALT SERPL-CCNC: 17 U/L (ref 10–40)
ANION GAP SERPL CALCULATED.3IONS-SCNC: 13 MMOL/L (ref 4–16)
AST SERPL-CCNC: 19 IU/L (ref 15–37)
BASOPHILS ABSOLUTE: 0.1 K/CU MM
BASOPHILS RELATIVE PERCENT: 1 % (ref 0–1)
BILIRUB SERPL-MCNC: 0.8 MG/DL (ref 0–1)
BUN BLDV-MCNC: 25 MG/DL (ref 6–23)
CALCIUM SERPL-MCNC: 9 MG/DL (ref 8.3–10.6)
CHLORIDE BLD-SCNC: 101 MMOL/L (ref 99–110)
CO2: 24 MMOL/L (ref 21–32)
CREAT SERPL-MCNC: 1.2 MG/DL (ref 0.9–1.3)
DIFFERENTIAL TYPE: ABNORMAL
EKG ATRIAL RATE: 99 BPM
EKG DIAGNOSIS: NORMAL
EKG P-R INTERVAL: 168 MS
EKG Q-T INTERVAL: 372 MS
EKG QRS DURATION: 84 MS
EKG QTC CALCULATION (BAZETT): 477 MS
EKG R AXIS: -28 DEGREES
EKG T AXIS: 110 DEGREES
EKG VENTRICULAR RATE: 99 BPM
EOSINOPHILS ABSOLUTE: 0.3 K/CU MM
EOSINOPHILS RELATIVE PERCENT: 3.1 % (ref 0–3)
GFR AFRICAN AMERICAN: >60 ML/MIN/1.73M2
GFR NON-AFRICAN AMERICAN: 58 ML/MIN/1.73M2
GLUCOSE BLD-MCNC: 179 MG/DL (ref 70–99)
HCT VFR BLD CALC: 45 % (ref 42–52)
HEMOGLOBIN: 14.7 GM/DL (ref 13.5–18)
IMMATURE NEUTROPHIL %: 0.4 % (ref 0–0.43)
LIPASE: 21 IU/L (ref 13–60)
LYMPHOCYTES ABSOLUTE: 1.3 K/CU MM
LYMPHOCYTES RELATIVE PERCENT: 12.6 % (ref 24–44)
MAGNESIUM: 1.7 MG/DL (ref 1.8–2.4)
MCH RBC QN AUTO: 28.7 PG (ref 27–31)
MCHC RBC AUTO-ENTMCNC: 32.7 % (ref 32–36)
MCV RBC AUTO: 87.9 FL (ref 78–100)
MONOCYTES ABSOLUTE: 0.6 K/CU MM
MONOCYTES RELATIVE PERCENT: 6 % (ref 0–4)
PDW BLD-RTO: 13.4 % (ref 11.7–14.9)
PLATELET # BLD: 283 K/CU MM (ref 140–440)
PMV BLD AUTO: 11.5 FL (ref 7.5–11.1)
POTASSIUM SERPL-SCNC: 3.9 MMOL/L (ref 3.5–5.1)
RBC # BLD: 5.12 M/CU MM (ref 4.6–6.2)
SEGMENTED NEUTROPHILS ABSOLUTE COUNT: 8 K/CU MM
SEGMENTED NEUTROPHILS RELATIVE PERCENT: 76.9 % (ref 36–66)
SODIUM BLD-SCNC: 138 MMOL/L (ref 135–145)
TOTAL IMMATURE NEUTOROPHIL: 0.04 K/CU MM
TOTAL PROTEIN: 5.4 GM/DL (ref 6.4–8.2)
TROPONIN T: <0.01 NG/ML
WBC # BLD: 10.4 K/CU MM (ref 4–10.5)

## 2022-04-13 PROCEDURE — 6360000002 HC RX W HCPCS: Performed by: EMERGENCY MEDICINE

## 2022-04-13 PROCEDURE — 71046 X-RAY EXAM CHEST 2 VIEWS: CPT

## 2022-04-13 PROCEDURE — 80053 COMPREHEN METABOLIC PANEL: CPT

## 2022-04-13 PROCEDURE — 93005 ELECTROCARDIOGRAM TRACING: CPT | Performed by: EMERGENCY MEDICINE

## 2022-04-13 PROCEDURE — 96365 THER/PROPH/DIAG IV INF INIT: CPT

## 2022-04-13 PROCEDURE — 85025 COMPLETE CBC W/AUTO DIFF WBC: CPT

## 2022-04-13 PROCEDURE — 93010 ELECTROCARDIOGRAM REPORT: CPT | Performed by: INTERNAL MEDICINE

## 2022-04-13 PROCEDURE — 83735 ASSAY OF MAGNESIUM: CPT

## 2022-04-13 PROCEDURE — 99284 EMERGENCY DEPT VISIT MOD MDM: CPT

## 2022-04-13 PROCEDURE — 83690 ASSAY OF LIPASE: CPT

## 2022-04-13 PROCEDURE — 84484 ASSAY OF TROPONIN QUANT: CPT

## 2022-04-13 RX ORDER — MAGNESIUM SULFATE IN WATER 40 MG/ML
2000 INJECTION, SOLUTION INTRAVENOUS ONCE
Status: COMPLETED | OUTPATIENT
Start: 2022-04-13 | End: 2022-04-13

## 2022-04-13 RX ADMIN — MAGNESIUM SULFATE HEPTAHYDRATE 2000 MG: 2 INJECTION, SOLUTION INTRAVENOUS at 12:21

## 2022-04-13 NOTE — Clinical Note
Susan Disla was seen and treated in our emergency department on 4/13/2022. He may return to work on 04/15/2022. If you have any questions or concerns, please don't hesitate to call.       August Allen MD

## 2022-04-13 NOTE — ED PROVIDER NOTES
Emergency [facility] DEPARTMENT    Patient: Wally Gilford  MRN: [id number]  : 1940  Date of Evaluation: 2022  ED Provider: Mack York MD    Chief Complaint       Chief Complaint   Patient presents with    Tachycardia     c/o increased heart rate while on the treadmill at the [facility]. Denies chest pains or dizzyness     Lilibeth Almeida is a 78 y.o. male who presents to the emergency department for evaluation of reported elevated heart rate. Patient reports being in usual state of health until earlier on today. He states that he was at the [facility] earlier on today and getting on the treadmill. When he grabbed onto the supports it took his heart rate and said that his heart rate was in the 170s. Patient denied feeling palpitations lightheaded chest pain or shortness of breath. He did switch machines to have his heart rate checked out again and it was still elevated according to the machine. He got off the machine and asked one of his friends who is a nurse there to take his pulse and she reports that it was skipping a couple beats but was not overly fast.  Patient says he does have a history of having atrial fibrillation but does not feel that he is in atrial fibrillation but he decided to come to the emergency department for evaluation. Patient denies any symptoms at all at this time. No recent change of diet or medications no fever or trauma no no swelling of legs or feet no chest pain or shortness of breath no palpitations. Does report he does take amiodarone for atrial fibrillation and has not had any recurrent episodes of atrial fibrillation since being on this medication. ROS:     At least 10 systems reviewed and otherwise acutely negative except as in the [address]     Past History     Past Medical History:   Diagnosis Date    Arthritis     Back pain, chronic     BPH (benign prostatic hyperplasia)     CAD (coronary artery disease) stents RCA, Cx, LAD    Cancer ([facility] [address] 12/3/12    skin cancer removed    Family history of coronary artery disease     H/O cardiovascular stress test 17, 20    EF 50-60%. Mild anterior and septal wall ischemia.  H/O Doppler ultrasound 2019    No evidence of DVT or SVT. There appears to be calcified thrombus with in the right small saphenous vein. Significant reflux noted of the Right CFV 5.0s.    H/O echocardiogram ; 2019    EF 50-55%. Diastolic Dysfunction Grade I . Sclerotic, but non-stenotic aortic valve. Aortic root dimension upper limits of normal 3.7cm.      Hyperlipidemia     Hypertension     Leg swelling 2019    [facility]ox[facility]mal [facility] fibrillation) ([facility])     S/P angioplasty with stent 10/6/03;10/16/03;10/5/04    stent RCA; stent CX; stent LAD  &     SOB (shortness of breath) 2017    Torsade de pointes 2012     Past Surgical History:   Procedure Laterality Date    ABDOMINAL HERNIA REPAIR      CATARACT REMOVAL      CORONARY ANGIOPLASTY WITH STENT PLACEMENT      DIAGNOSTIC CARDIAC CATH LAB PROCEDURE   10/6/03;10/16/03;10/5/04;3/12    PTCA with stents; 3-12 cont med therapy     Social History     Socioeconomic History    Marital status:      Spouse name: None    Number of children: None    Years of education: None    Highest education level: None   Occupational History    None   Tobacco Use    Smoking status: Former Smoker     Years: 25.00     Types: Cigarettes, Pipe     Quit date: 1980     Years since quittin.3    Smokeless tobacco: Never Used   Vaping Use    Vaping Use: Never used   Substance and Sexual Activity    Alcohol use: No     Comment: occassional    Drug use: No    Sexual activity: Yes     Partners: Female     Comment:    Other Topics Concern    None   Social History Narrative    None     Social Determinants of Health     Financial Resource Strain:     Difficulty of Paying Living Expenses: Not on file   Food Insecurity:     Worried About Running Out of Food in the Last Year: Not on file    Susan of Food in the Last Year: Not on file   Transportation Needs:     Lack of Transportation (Medical): Not on file    Lack of Transportation (Non-Medical): Not on file   Physical Activity:     Days of Exercise per Week: Not on file    Minutes of Exercise per Session: Not on file   Stress:     Feeling of Stress : Not on file   Social Connections:     Frequency of Communication with Friends and Family: Not on file    Frequency of Social Gatherings with Friends and Family: Not on file    Attends Anabaptist Services: Not on file    Active Member of [address] [place] [address] [employer] or Organizations: Not on file    Attends Club or Organization Meetings: Not on file    Marital Status: Not on file   Intimate Partner Violence:     Fear of Current or Ex-Partner: Not on file    Emotionally Abused: Not on file    Physically Abused: Not on file    Sexually Abused: Not on file   Housing Stability:     Unable to Pay for Housing in the Last Year: Not on file    Number of [place] in the Last Year: Not on file    Unstable Housing in the Last Year: Not on file       Medications/Allergies     Previous Medications    AMIODARONE (CORDARONE) 200 MG TABLET    Take 0.5 tablets by mouth daily    AMLODIPINE-BENAZEPRIL (LOTREL) 5-20 MG PER CAPSULE    Take 1 capsule by mouth daily    ASPIRIN 81 MG EC TABLET    Take 81 mg by mouth daily. ATORVASTATIN (LIPITOR) 40 MG TABLET    Take 1 tablet by mouth daily    INDAPAMIDE (LOZOL) 1.25 MG TABLET    Take 1.25 mg by mouth every morning    MULTIVITAMIN (THERAGRAN) PER TABLET    Take 1 tablet by mouth daily. No Known Allergies     Physical Exam       ED Triage Vitals [04/13/22 1026]   BP Temp Temp Source Pulse Resp SpO2 Height Weight   108/68 98 °F (36.7 °C) Oral 95 18 96 % 5' 11\" (1.803 m) 180 lb (81.6 kg)     GENERAL APPEARANCE: Awake and alert. Cooperative. No acute distress. HEAD: Normocephalic. Atraumatic. EYES: Sclera anicteric. Pupils equal round reactive to light extraocular movements are intact  ENT: Tolerates saliva. No trismus. Moist mucous membranes  NECK: Supple. Trachea midline. No meningismus  CARDIO: RRR. Radial pulse 2+. No murmurs rubs or gallops appreciated  LUNGS: Respirations unlabored. CTAB. No accessory muscle usage noted. No wheezes rales rhonchi or stridor. ABDOMEN: Soft. Non-distended. Non-tender. No tenderness in right upper quadrant or right lower quadrant to deep palpation  EXTREMITIES: No acute deformities. No unilateral leg swelling or tenderness behind either one of calves  SKIN: Warm and dry. No erythema edema or rashes appreciated  NEUROLOGICAL:  Cranial nerves II through XII grossly intact. No gross facial drooping. Moves all 4 extremities spontaneously. PSYCHIATRIC: Normal mood. Alert and oriented x3. No reported active suicidality or homicidality.     Diagnostics   Labs:  Results for orders placed or performed during the hospital encounter of 04/13/22   CBC with Auto Differential   Result Value Ref Range    WBC 10.4 4.0 - 10.5 K/CU MM    RBC 5.12 4.6 - 6.2 M/CU MM    Hemoglobin 14.7 13.5 - 18.0 GM/DL    Hematocrit 45.0 42 - 52 %    MCV 87.9 78 - 100 FL    MCH 28.7 27 - 31 PG    MCHC 32.7 32.0 - 36.0 %    RDW 13.4 11.7 - 14.9 %    Platelets 445 724 - 889 K/CU MM    MPV 11.5 (H) 7.5 - 11.1 FL    Differential Type AUTOMATED DIFFERENTIAL     Segs Relative 76.9 (H) 36 - 66 %    Lymphocytes % 12.6 (L) 24 - 44 %    Monocytes % 6.0 (H) 0 - 4 %    Eosinophils % 3.1 (H) 0 - 3 %    Basophils % 1.0 0 - 1 %    Segs Absolute 8.0 K/CU MM    Lymphocytes Absolute 1.3 K/CU MM    Monocytes Absolute 0.6 K/CU MM    Eosinophils Absolute 0.3 K/CU MM    Basophils Absolute 0.1 K/CU MM    Immature Neutrophil % 0.4 0 - 0.43 %    Total Immature Neutrophil 0.04 K/CU MM   Comprehensive Metabolic Panel w/ Reflex to MG   Result Value Ref Range    Sodium 138 135 - 145 MMOL/L    Potassium 3.9 3.5 - 5.1 MMOL/L    Chloride 101 99 - 110 mMol/L    CO2 24 21 - 32 MMOL/L    BUN 25 (H) 6 - 23 MG/DL    CREATININE 1.2 0.9 - 1.3 MG/DL    Glucose 179 (H) 70 - 99 MG/DL    Calcium 9.0 8.3 - 10.6 MG/DL    Albumin 3.8 3.4 - 5.0 GM/DL    Total Protein 5.4 (L) 6.4 - 8.2 GM/DL    Total Bilirubin 0.8 0.0 - 1.0 MG/DL    ALT 17 10 - 40 U/L    AST 19 15 - 37 IU/L    Alkaline Phosphatase 103 40 - 129 IU/L    GFR Non- 58 (L) >60 mL/min/1.73m2    GFR African American >60 >60 mL/min/1.73m2    Anion Gap 13 4 - 16   Lipase   Result Value Ref Range    Lipase 21 13 - 60 IU/L   Troponin   Result Value Ref Range    Troponin T <0.010 <0.01 NG/ML   Magnesium   Result Value Ref Range    Magnesium 1.7 (L) 1.8 - 2.4 mg/dl     Radiographs:  XR CHEST (2 VW)    Result Date: 4/13/2022  EXAMINATION: TWO XRAY VIEWS OF THE CHEST 4/13/2022 10:34 am COMPARISON: Chest x-ray 02/05/2020 HISTORY: ORDERING SYSTEM PROVIDED HISTORY: dyspnea TECHNOLOGIST PROVIDED HISTORY: Reason for exam:->dyspnea Reason for Exam: dyspnea Additional signs and symptoms: Tachycardia, c/o increased heart rate while on the treadmill at the [facility]. Denies chest pains or dizzyness FINDINGS: The lungs are clear and well expanded. Costophrenic angles are clear. Cardiac and mediastinal structures are unremarkable. Osseous structures are intact. No evidence of acute cardiopulmonary disease. Procedures/EKG:   Patient's EKG is interpreted by me sinus rhythm rate 99   no ST elevation no ST depression multiple PVCs and PACs noted. I appreciate no acute ischemia or infarctions EKG no old EKGs with which to compare    ED Course and MDM   In brief, Venkat Nassar is a 78 y.o. male who presented to the emergency department for evaluation of concern for elevated heart rate. Based on patient's history and physical would be concerned that the machine is picking up a table rate due to PVCs and PACs.   Patient says he does not feel like he is in atrial fibrillation he denies any current complaints other possibilities patient symptoms do include electrolyte abnormalities or even a dysrhythmia. I did review patient's imaging studies and laboratory work as noted above. Patient is mildly hypomagnesemic but no other acute abnormalities identified at this time. I discussed the findings with patient and family at bedside. Recommend repletion of the patient's magnesium this is may help with a concern for possible dysrhythmia. Patient is remained asymptomatic while here in the emergency department patient and family at bedside are in full agreement with this plan. Patient was monitored in the emergency department for several hours. Did not have any episodes of palpitations or atrial fibrillation. Did replete his magnesium. The patient was walked throughout the emergency department denied any current symptoms. He is requesting to be discharged home. Recommend close follow-up with primary care physician or referral physician next 24 to 48 hours. Return to the emergency department for chest pain, shortness of breath, fevers, weakness or any other concerning symptoms he did express a verbal understanding of the instructions and those were comfortable to be discharged home    ED Medication Orders (From admission, onward)    Start Ordered     Status Ordering Provider    04/13/22 1115 04/13/22 1114  magnesium sulfate 2000 mg in 50 mL IVPB premix  ONCE         Last MAR action: Stopped - by Jesús Velasquez on 04/13/22 at [address]          Final Impression      1. Palpitations      DISPOSITION           This patient was cared for in the setting of the COVID-19 pandemic, with nationwide stress on resources and staffing.     (Please note that portions of this note may have been completed with a voice recognition program. Efforts were made to edit the dictations but occasionally words are mis-transcribed.)    Amie Thrasher MD  [address] [place] [address] Chris Dougherty MD  04/13/22 9735

## 2022-04-13 NOTE — ED NOTES
Patient ambulated in the hallway, tolerated well with no complaints.      Anupam Cuba RN  04/13/22 9432

## 2022-04-13 NOTE — ED NOTES
Discharge instructions reviewed with patient and patients wife. No additional questions asked. Voiced understanding. Encouraged patient to follow up as discussed by the ED physician.      Jose Cummings RN  04/13/22 1076

## 2022-04-25 ENCOUNTER — OFFICE VISIT (OUTPATIENT)
Dept: CARDIOLOGY CLINIC | Age: 82
End: 2022-04-25
Payer: MEDICARE

## 2022-04-25 VITALS
WEIGHT: 181 LBS | HEIGHT: 71 IN | BODY MASS INDEX: 25.34 KG/M2 | HEART RATE: 60 BPM | DIASTOLIC BLOOD PRESSURE: 64 MMHG | SYSTOLIC BLOOD PRESSURE: 126 MMHG

## 2022-04-25 DIAGNOSIS — E78.00 PURE HYPERCHOLESTEROLEMIA: ICD-10-CM

## 2022-04-25 DIAGNOSIS — R00.2 PALPITATION: ICD-10-CM

## 2022-04-25 DIAGNOSIS — I10 PRIMARY HYPERTENSION: ICD-10-CM

## 2022-04-25 DIAGNOSIS — Z95.820 S/P ANGIOPLASTY WITH STENT: Primary | ICD-10-CM

## 2022-04-25 DIAGNOSIS — I48.0 PAF (PAROXYSMAL ATRIAL FIBRILLATION) (HCC): ICD-10-CM

## 2022-04-25 PROCEDURE — 1036F TOBACCO NON-USER: CPT | Performed by: INTERNAL MEDICINE

## 2022-04-25 PROCEDURE — 4040F PNEUMOC VAC/ADMIN/RCVD: CPT | Performed by: INTERNAL MEDICINE

## 2022-04-25 PROCEDURE — G8417 CALC BMI ABV UP PARAM F/U: HCPCS | Performed by: INTERNAL MEDICINE

## 2022-04-25 PROCEDURE — 99214 OFFICE O/P EST MOD 30 MIN: CPT | Performed by: INTERNAL MEDICINE

## 2022-04-25 PROCEDURE — 1123F ACP DISCUSS/DSCN MKR DOCD: CPT | Performed by: INTERNAL MEDICINE

## 2022-04-25 PROCEDURE — G8427 DOCREV CUR MEDS BY ELIG CLIN: HCPCS | Performed by: INTERNAL MEDICINE

## 2022-04-25 RX ORDER — MAGNESIUM OXIDE 400 MG/1
400 TABLET ORAL DAILY
Qty: 30 TABLET | Refills: 1 | Status: SHIPPED | OUTPATIENT
Start: 2022-04-25 | End: 2022-05-20

## 2022-04-25 NOTE — PROGRESS NOTES
Salena Marcano (:  1940) is a 78 y.o. male,     Chief Complaint   Patient presents with    Atrial Fibrillation     OV for follow up from ER. Pt denies any chest pain,dizziness,swelling to ankles, or palpitations. He does have some SOB.  Coronary Artery Disease    Hypertension    Hyperlipidemia     Patient is here for follow up for palpitations. He was at [facility] had started exercising and felt his heart was racing and the monitor on the equipment he was exercising reported heart rate of 166 bpm and that scared him. He denies any dizziness or any chest pains. He switched to another equipment and it reported 180 bpm.  There was a nurse at [facility] and he asked her to check and she told him that he was having very frequent skipped beats and he came to the emergency room with the symptoms. Emergency room records are reviewed. EKG showed sinus rhythm with frequent PACs mostly in a trigeminal fashion and also PVCs. Heart rate was 99 bpm.  There are no acute changes. Lab results were reviewed and his magnesium was 1.7. He was given IV magnesium and was sent home for outpatient follow-up. He had been to [facility] [employer] since then he has not had much issues. No Known Allergies  Prior to Admission medications    Medication Sig Start Date End Date Taking? Authorizing Provider   magnesium oxide (MAG-OX) 400 MG tablet Take 1 tablet by mouth daily 22  Yes Monie Le MD   amiodarone (CORDARONE) 200 MG tablet Take 0.5 tablets by mouth daily 21  Yes Monie Le MD   amLODIPine-benazepril (LOTREL) 5-20 MG per capsule Take 1 capsule by mouth daily 21  Yes Monie Le MD   atorvastatin (LIPITOR) 40 MG tablet Take 1 tablet by mouth daily 21  Yes Monie Le MD   indapamide (LOZOL) 1.25 MG tablet Take 1.25 mg by mouth every morning   Yes Historical Provider, MD   multivitamin [facility]) per tablet Take 1 tablet by mouth daily.      Yes Historical Provider, MD   aspirin 81 MG EC tablet Take 81 mg by mouth daily     Yes Historical Provider, MD     Past Medical History:   Diagnosis Date    Arthritis     Back pain, chronic     BPH (benign prostatic hyperplasia)     CAD (coronary artery disease)     stents RCA, Cx, LAD    Cancer ([facility] [facility] 12/3/12    skin cancer removed    Family history of coronary artery disease     H/O cardiovascular stress test 6/6/17, 8/18/20    EF 50-60%. Mild anterior and septal wall ischemia.  H/O Doppler ultrasound 08/13/2019    No evidence of DVT or SVT. There appears to be calcified thrombus with in the right small saphenous vein. Significant reflux noted of the Right CFV 5.0s.    H/O echocardiogram 1/12; 8/13/2019    EF 50-55%. Diastolic Dysfunction Grade I . Sclerotic, but non-stenotic aortic valve. Aortic root dimension upper limits of normal 3.7cm.  Hyperlipidemia     Hypertension     Leg swelling 8/5/2019    PAF (paroxysmal atrial fibrillation) ([facility] [address]     Palpitation 4/25/2022    S/P angioplasty with stent 10/6/03;10/16/03;10/5/04    stent RCA; stent CX; stent LAD 2003 & 2004    SOB (shortness of breath) 6/6/2017    Torsade de pointes 1/8/2012      Vitals:    04/25/22 1350   BP: 126/64   Pulse: 60   Weight: 181 lb (82.1 kg)   Height: 5' 11\" (1.803 m)      Body mass index is 25.24 kg/m². Wt Readings from Last 3 Encounters:   04/25/22 181 lb (82.1 kg)   04/13/22 180 lb (81.6 kg)   03/01/22 180 lb (81.6 kg)     Cardiovascular:  Auscultation: Normal S1 and S2.  No murmurs or gallops noted. Carotids are negative for bruits.  Abdominal aorta is nonpalpable.  No epigastric bruit noted. Peripheral pulses: 1+ equal on both sides  Respiratory:  Respiratory effort normal.  Breath sounds are clear to auscultation  Extremities: No clubbing,  Cyanosis, petechiae.  Superficial venous telengiectasia noted bilaterally. SKIN: Warm and well perfused, no pallor or cyanosis  Abdomen:  No masses or tenderness. No organomegaly noted.   Musculoskeletal:  No significant spinal deformities noted.  Gait is normal  Muscle strength is normal.  Neurologic:  Oriented to time, place, and person and non-anxious. No focal neurological deficit. Fine bilateral hand tremors noted    Pertinent records reviewed and discussed with patient and results are as follow:    Lab Results   Component Value Date    WBC 10.4 04/13/2022    HGB 14.7 04/13/2022    HCT 45.0 04/13/2022     04/13/2022     Lab Results   Component Value Date    CHOL 101 05/11/2020    CHOLFAST 108 08/27/2021    TRIG 101 05/11/2020    TRIGLYCFAST 104 08/27/2021    HDL 37 (L) 08/27/2021    LDLCALC 71 05/29/2014    LDLDIRECT 58 08/27/2021     Lab Results   Component Value Date    BUN 25 04/13/2022    CREATININE 1.2 04/13/2022     04/13/2022    K 3.9 04/13/2022     Lab Results   Component Value Date    INR 0.96 06/05/2013     ASSESSMENT/PLAN:    1. CAD S/P PCI with stents Cx+LAD 6498,1991  2. Primary hypertension  Assessment & Plan:  Well-controlled on Lozol and Lotrel. Continue the same. 3. PAF (paroxysmal atrial fibrillation) ([facility] [address]  Assessment & Plan: We will do a 48-hour Holter monitor to see if he has paroxysmal atrial fibrillation with rapid ventricular rate response. We discussed anticoagulation therapy to minimize stroke risk however he wants to stay on low-dose aspirin for now. Orders:  -     Basic Metabolic Panel; Future  -     Magnesium; Future  -     Holter Monitor 48 Hour; Future  4. Pure hypercholesterolemia  5. Palpitation  Assessment & Plan:  Symptomatic frequent PACs and PVCs during exercise in this patient with hypomagnesemia on lab test in emergency room. I will start him on Mag-Ox 400 mg daily and repeat magnesium in 4 weeks. Obtain a 48-hour Holter monitor. He can continue to exercise as long as he is not symptomatic with tachyarrhythmias and had not had occurrence since April 13. He verbalized understanding. Continue amiodarone and other current medications.        Follow-up in 6 weeks, sooner if needed. An electronic signature was used to authenticate this note.     --Ariel Summers MD

## 2022-04-25 NOTE — ASSESSMENT & PLAN NOTE
We will do a 48-hour Holter monitor to see if he has paroxysmal atrial fibrillation with rapid ventricular rate response. We discussed anticoagulation therapy to minimize stroke risk however he wants to stay on low-dose aspirin for now.

## 2022-05-03 DIAGNOSIS — I48.0 PAF (PAROXYSMAL ATRIAL FIBRILLATION) (HCC): ICD-10-CM

## 2022-05-20 RX ORDER — MAGNESIUM OXIDE 400 MG/1
400 TABLET ORAL DAILY
Qty: 30 TABLET | Refills: 1 | Status: SHIPPED | OUTPATIENT
Start: 2022-05-20 | End: 2022-06-06 | Stop reason: SDUPTHER

## 2022-05-31 ENCOUNTER — HOSPITAL ENCOUNTER (OUTPATIENT)
Age: 82
Discharge: HOME | End: 2022-05-31
Payer: MEDICARE

## 2022-05-31 LAB
ANION GAP SERPL CALCULATED.3IONS-SCNC: 15 MMOL/L (ref 4–16)
BUN BLDV-MCNC: 32 MG/DL (ref 6–23)
CALCIUM SERPL-MCNC: 9.4 MG/DL (ref 8.3–10.6)
CHLORIDE BLD-SCNC: 103 MMOL/L (ref 99–110)
CO2: 23 MMOL/L (ref 21–32)
CREAT SERPL-MCNC: 1.4 MG/DL (ref 0.9–1.3)
GFR AFRICAN AMERICAN: 59 ML/MIN/1.73M2
GFR NON-AFRICAN AMERICAN: 48 ML/MIN/1.73M2
GLUCOSE FASTING: 108 MG/DL (ref 70–99)
MAGNESIUM: 2.3 MG/DL (ref 1.8–2.4)
POTASSIUM SERPL-SCNC: 4.2 MMOL/L (ref 3.5–5.1)
SODIUM BLD-SCNC: 141 MMOL/L (ref 135–145)

## 2022-05-31 PROCEDURE — 80048 BASIC METABOLIC PNL TOTAL CA: CPT

## 2022-05-31 PROCEDURE — 83735 ASSAY OF MAGNESIUM: CPT

## 2022-05-31 PROCEDURE — 36415 COLL VENOUS BLD VENIPUNCTURE: CPT

## 2022-06-06 ENCOUNTER — OFFICE VISIT (OUTPATIENT)
Dept: CARDIOLOGY CLINIC | Age: 82
End: 2022-06-06
Payer: MEDICARE

## 2022-06-06 VITALS
DIASTOLIC BLOOD PRESSURE: 70 MMHG | SYSTOLIC BLOOD PRESSURE: 122 MMHG | BODY MASS INDEX: 25.2 KG/M2 | HEIGHT: 71 IN | WEIGHT: 180 LBS | HEART RATE: 60 BPM

## 2022-06-06 DIAGNOSIS — E78.00 PURE HYPERCHOLESTEROLEMIA: ICD-10-CM

## 2022-06-06 DIAGNOSIS — I48.0 PAF (PAROXYSMAL ATRIAL FIBRILLATION) (HCC): ICD-10-CM

## 2022-06-06 DIAGNOSIS — Z95.820 S/P ANGIOPLASTY WITH STENT: Primary | ICD-10-CM

## 2022-06-06 DIAGNOSIS — I10 PRIMARY HYPERTENSION: ICD-10-CM

## 2022-06-06 PROCEDURE — 1123F ACP DISCUSS/DSCN MKR DOCD: CPT | Performed by: INTERNAL MEDICINE

## 2022-06-06 PROCEDURE — 99213 OFFICE O/P EST LOW 20 MIN: CPT | Performed by: INTERNAL MEDICINE

## 2022-06-06 PROCEDURE — G8427 DOCREV CUR MEDS BY ELIG CLIN: HCPCS | Performed by: INTERNAL MEDICINE

## 2022-06-06 PROCEDURE — G8417 CALC BMI ABV UP PARAM F/U: HCPCS | Performed by: INTERNAL MEDICINE

## 2022-06-06 PROCEDURE — 1036F TOBACCO NON-USER: CPT | Performed by: INTERNAL MEDICINE

## 2022-06-06 RX ORDER — ATORVASTATIN CALCIUM 40 MG/1
40 TABLET, FILM COATED ORAL DAILY
Qty: 90 TABLET | Refills: 3 | Status: SHIPPED | OUTPATIENT
Start: 2022-06-06

## 2022-06-06 RX ORDER — AMLODIPINE BESYLATE AND BENAZEPRIL HYDROCHLORIDE 5; 20 MG/1; MG/1
1 CAPSULE ORAL DAILY
Qty: 30 CAPSULE | Refills: 5 | Status: SHIPPED | OUTPATIENT
Start: 2022-06-06

## 2022-06-06 RX ORDER — INDAPAMIDE 1.25 MG/1
1.25 TABLET, FILM COATED ORAL EVERY MORNING
Qty: 90 TABLET | Refills: 3 | Status: SHIPPED | OUTPATIENT
Start: 2022-06-06

## 2022-06-06 RX ORDER — MAGNESIUM OXIDE 400 MG/1
400 TABLET ORAL DAILY
Qty: 90 TABLET | Refills: 3 | Status: ON HOLD | OUTPATIENT
Start: 2022-06-06 | End: 2022-07-26 | Stop reason: HOSPADM

## 2022-06-06 NOTE — PATIENT INSTRUCTIONS
Continue current cardiovascular medications which have been reviewed and discussed individually with you. He is acceptable risk for knee replacement for age and has been stable cardiacwise. Continue regular exercises. Follow-up in 6 months with EKG, sooner if needed.

## 2022-06-06 NOTE — PROGRESS NOTES
Anastasia Castro (:  1940) is a 78 y.o. male,     Chief Complaint   Patient presents with    Atrial Fibrillation     Pt denies any chest pain,SOB,dizziness,swelling to ankles, or palpitations.  Coronary Artery Disease    Hypertension    Hyperlipidemia     Patient is here for follow up for palpitations. He states palpitations resolved since he has been taking magnesium and he is going to [facility] regularly to exercise mainly the knee pain and back pain stops him from doing too much. He denies any other cardiac symptoms particularly denies any chest pains or shortness of breath or excessive fatigue. Patient had repeat labs and 48-hour cardiac monitoring done and we have reviewed the results and discussed with him and multiple questions are answered. Medications reviewed and renewed also. He wants to know if he can go through knee replacement surgeries. We reviewed the records and had nuclear stress test in  had only mild ischemia. He is asymptomatic and he is considered acceptable risk to proceed with knee surgery however he also has back problems and he needs to address that to otherwise his quality of life may not be significantly improved . Carmen knee surgeries. No Known Allergies  Prior to Admission medications    Medication Sig Start Date End Date Taking? Authorizing Provider   magnesium oxide (MAG-OX) 400 MG tablet TAKE 1 TABLET BY MOUTH DAILY 22  Yes Shant Yee MD   amiodarone (CORDARONE) 200 MG tablet Take 0.5 tablets by mouth daily 21  Yes Shant Yee MD   amLODIPine-benazepril (LOTREL) 5-20 MG per capsule Take 1 capsule by mouth daily 21  Yes Shant Yee MD   atorvastatin (LIPITOR) 40 MG tablet Take 1 tablet by mouth daily 21  Yes Shant Yee MD   indapamide (LOZOL) 1.25 MG tablet Take 1.25 mg by mouth every morning   Yes Historical Provider, MD   multivitamin [facility]) per tablet Take 1 tablet by mouth daily.      Yes Historical Provider, MD aspirin 81 MG EC tablet Take 81 mg by mouth daily     Yes Historical Provider, MD     Past Medical History:   Diagnosis Date    Arthritis     Back pain, chronic     BPH (benign prostatic hyperplasia)     CAD (coronary artery disease)     stents RCA, Cx, LAD    [facility]/12    skin cancer removed    Family history of coronary artery disease     H/O cardiovascular stress test 6/6/17, 8/18/20    EF 50-60%. Mild anterior and septal wall ischemia.  H/O Doppler ultrasound 08/13/2019    No evidence of DVT or SVT. There appears to be calcified thrombus with in the right small saphenous vein. Significant reflux noted of the Right CFV 5.0s.    H/O echocardiogram 1/12; 8/13/2019    EF 50-55%. Diastolic Dysfunction Grade I . Sclerotic, but non-stenotic aortic valve. Aortic root dimension upper limits of normal 3.7cm.  Hyperlipidemia     Hypertension     Leg swelling 8/5/2019    [facility]     Palpitation 4/25/2022    S/P angioplasty with stent 10/6/03;10/16/03;10/5/04    stent RCA; stent CX; stent LAD 2003 & 2004    SOB (shortness of breath) 6/6/2017    Torsade de pointes 1/8/2012      Vitals:    06/06/22 1430   BP: 122/70   Pulse: 60   Weight: 180 lb (81.6 kg)   Height: 5' 11\" (1.803 m)      Body mass index is 25.1 kg/m².   Wt Readings from Last 3 Encounters:   06/06/22 180 lb (81.6 kg)   04/25/22 181 lb (82.1 kg)   04/13/22 180 lb (81.6 kg)       48 hours of cardiac monitoring done to evaluate paroxysmal atrial fibrillation and palpitations.  Rhythm is predominantly normal sinus at average rate of 63 bpm.  Minimum rate of 44 bpm occurred at 4:27 AM.  Maximum rate of 112 bpm occurred at 7:53 AM.  Frequent 3879 PVCs and 3182 PACs are noted.  3 beat run of ventricular tachycardia noted at 8:33 AM and 5 beat run of SVT noted at 3:28 AM.  Patient reported activities in the diary without much symptoms.     Conclusion: Normal sinus rhythm with physiological heart rate variations and frequent ventricular and supraventricular ectopy without symptomatic complex arrhythmias. Pertinent records reviewed and discussed with patient and results are as follow:    Lab Results   Component Value Date    WBC 10.4 04/13/2022    HGB 14.7 04/13/2022    HCT 45.0 04/13/2022     04/13/2022     Lab Results   Component Value Date    CHOL 101 05/11/2020    CHOLFAST 108 08/27/2021    TRIG 101 05/11/2020    TRIGLYCFAST 104 08/27/2021    HDL 37 (L) 08/27/2021    LDLCALC 71 05/29/2014    LDLDIRECT 58 08/27/2021     Component Ref Range & Units 5/31/22 0930 4/13/22 1034 3/9/21 1010 5/11/20 0900 7/9/19 0900 8/20/18 0830 12/18/17 0920   Sodium 135 - 145 MMOL/L 141  138  140  142  143  142  143    Potassium 3.5 - 5.1 MMOL/L 4.2  3.9  4.7  4.0  4.1  4.2  3.8    Chloride 99 - 110 mMol/L 103  101  104  101  103  101  101    CO2 21 - 32 MMOL/L 23  24  29  31  31  30  30    Anion Gap 4 - 16 15  13  7  10  9  11  12    BUN 6 - 23 MG/DL 32 High   25 High   25 High   21  27 High   29 High   27 High         Component Ref Range & Units 5/31/22 0930 4/13/22 1034 1/9/12 0600 1/7/12 1430   Magnesium 1.8 - 2.4 mg/dl 2.3  1.7 Low   2.2 R  2.3 R      Lab Results   Component Value Date    INR 0.96 06/05/2013     ASSESSMENT/PLAN:    1. CAD S/P PCI with stents Cx+LAD 6675,1330  2. Pure hypercholesterolemia  3. PAF (paroxysmal atrial fibrillation) ([facility] [address]  4. Primary hypertension    Continue current cardiovascular medications which have been reviewed and discussed individually with you. He is acceptable risk for knee replacement for age and has been stable cardiacwise. Continue regular exercises. Follow-up in 6 months with EKG, sooner if needed. On this date 6/6/2022 I have spent 20 minutes reviewing previous notes, test results and face to face with the patient discussing the diagnosis and importance of compliance with the treatment plan as well as documenting on the day of the visit.       An electronic signature was used to authenticate this note.     --Marce Melton MD

## 2022-06-21 RX ORDER — AMIODARONE HYDROCHLORIDE 200 MG/1
100 TABLET ORAL DAILY
Qty: 45 TABLET | Refills: 3 | Status: SHIPPED | OUTPATIENT
Start: 2022-06-21

## 2022-06-29 ENCOUNTER — TELEPHONE (OUTPATIENT)
Dept: CARDIOLOGY CLINIC | Age: 82
End: 2022-06-29

## 2022-06-29 DIAGNOSIS — I63.9 CEREBROVASCULAR ACCIDENT (CVA), UNSPECIFIED MECHANISM (HCC): Primary | ICD-10-CM

## 2022-06-29 NOTE — TELEPHONE ENCOUNTER
Chart is reviewed. Add Plavix 75 mg daily if tolerated to low-dose aspirin and obtain carotid Doppler study for further evaluation.

## 2022-06-29 NOTE — TELEPHONE ENCOUNTER
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 Doctor is wondering if he should be taking a blood thinner stronger then ASA?  I will send to DR Binta Campos to advise,

## 2022-06-30 RX ORDER — CLOPIDOGREL BISULFATE 75 MG/1
75 TABLET ORAL DAILY
Qty: 30 TABLET | Refills: 5 | Status: SHIPPED | OUTPATIENT
Start: 2022-06-30 | End: 2022-09-21

## 2022-07-07 ENCOUNTER — APPOINTMENT (OUTPATIENT)
Dept: CT IMAGING | Age: 82
End: 2022-07-07
Payer: MEDICARE

## 2022-07-07 ENCOUNTER — HOSPITAL ENCOUNTER (EMERGENCY)
Age: 82
Discharge: HOME | End: 2022-07-07
Attending: EMERGENCY MEDICINE
Payer: MEDICARE

## 2022-07-07 ENCOUNTER — APPOINTMENT (OUTPATIENT)
Dept: GENERAL RADIOLOGY | Age: 82
End: 2022-07-07
Payer: MEDICARE

## 2022-07-07 VITALS
TEMPERATURE: 97.7 F | OXYGEN SATURATION: 97 % | WEIGHT: 180 LBS | SYSTOLIC BLOOD PRESSURE: 156 MMHG | HEART RATE: 63 BPM | DIASTOLIC BLOOD PRESSURE: 71 MMHG | RESPIRATION RATE: 18 BRPM | BODY MASS INDEX: 25.1 KG/M2

## 2022-07-07 DIAGNOSIS — K80.20 CALCULUS OF GALLBLADDER WITHOUT CHOLECYSTITIS WITHOUT OBSTRUCTION: Primary | ICD-10-CM

## 2022-07-07 LAB
ALBUMIN SERPL-MCNC: 4.1 GM/DL (ref 3.4–5)
ALP BLD-CCNC: 111 IU/L (ref 40–129)
ALT SERPL-CCNC: 17 U/L (ref 10–40)
ANION GAP SERPL CALCULATED.3IONS-SCNC: 12 MMOL/L (ref 4–16)
AST SERPL-CCNC: 15 IU/L (ref 15–37)
BASOPHILS ABSOLUTE: 0.1 K/CU MM
BASOPHILS RELATIVE PERCENT: 0.6 % (ref 0–1)
BILIRUB SERPL-MCNC: 0.6 MG/DL (ref 0–1)
BUN BLDV-MCNC: 34 MG/DL (ref 6–23)
CALCIUM SERPL-MCNC: 9.6 MG/DL (ref 8.3–10.6)
CHLORIDE BLD-SCNC: 104 MMOL/L (ref 99–110)
CO2: 26 MMOL/L (ref 21–32)
CREAT SERPL-MCNC: 1.3 MG/DL (ref 0.9–1.3)
DIFFERENTIAL TYPE: ABNORMAL
EKG ATRIAL RATE: 69 BPM
EKG DIAGNOSIS: NORMAL
EKG P AXIS: 8 DEGREES
EKG P-R INTERVAL: 176 MS
EKG Q-T INTERVAL: 412 MS
EKG QRS DURATION: 88 MS
EKG QTC CALCULATION (BAZETT): 441 MS
EKG R AXIS: -32 DEGREES
EKG T AXIS: 16 DEGREES
EKG VENTRICULAR RATE: 69 BPM
EOSINOPHILS ABSOLUTE: 0.3 K/CU MM
EOSINOPHILS RELATIVE PERCENT: 2.4 % (ref 0–3)
GFR AFRICAN AMERICAN: >60 ML/MIN/1.73M2
GFR NON-AFRICAN AMERICAN: 53 ML/MIN/1.73M2
GLUCOSE BLD-MCNC: 101 MG/DL (ref 70–99)
HCT VFR BLD CALC: 39.2 % (ref 42–52)
HEMOGLOBIN: 12.9 GM/DL (ref 13.5–18)
IMMATURE NEUTROPHIL %: 0.6 % (ref 0–0.43)
LIPASE: 37 IU/L (ref 13–60)
LYMPHOCYTES ABSOLUTE: 1.1 K/CU MM
LYMPHOCYTES RELATIVE PERCENT: 7.8 % (ref 24–44)
MCH RBC QN AUTO: 28.7 PG (ref 27–31)
MCHC RBC AUTO-ENTMCNC: 32.9 % (ref 32–36)
MCV RBC AUTO: 87.1 FL (ref 78–100)
MONOCYTES ABSOLUTE: 1.4 K/CU MM
MONOCYTES RELATIVE PERCENT: 9.8 % (ref 0–4)
PDW BLD-RTO: 14 % (ref 11.7–14.9)
PLATELET # BLD: 269 K/CU MM (ref 140–440)
PMV BLD AUTO: 11.7 FL (ref 7.5–11.1)
POTASSIUM SERPL-SCNC: 4 MMOL/L (ref 3.5–5.1)
RBC # BLD: 4.5 M/CU MM (ref 4.6–6.2)
SEGMENTED NEUTROPHILS ABSOLUTE COUNT: 11.1 K/CU MM
SEGMENTED NEUTROPHILS RELATIVE PERCENT: 78.8 % (ref 36–66)
SODIUM BLD-SCNC: 142 MMOL/L (ref 135–145)
TOTAL IMMATURE NEUTOROPHIL: 0.08 K/CU MM
TOTAL PROTEIN: 6.6 GM/DL (ref 6.4–8.2)
TROPONIN T: <0.01 NG/ML
WBC # BLD: 14 K/CU MM (ref 4–10.5)

## 2022-07-07 PROCEDURE — 99285 EMERGENCY DEPT VISIT HI MDM: CPT

## 2022-07-07 PROCEDURE — 93010 ELECTROCARDIOGRAM REPORT: CPT | Performed by: INTERNAL MEDICINE

## 2022-07-07 PROCEDURE — 71045 X-RAY EXAM CHEST 1 VIEW: CPT

## 2022-07-07 PROCEDURE — 80053 COMPREHEN METABOLIC PANEL: CPT

## 2022-07-07 PROCEDURE — 84484 ASSAY OF TROPONIN QUANT: CPT

## 2022-07-07 PROCEDURE — 93005 ELECTROCARDIOGRAM TRACING: CPT | Performed by: EMERGENCY MEDICINE

## 2022-07-07 PROCEDURE — 85025 COMPLETE CBC W/AUTO DIFF WBC: CPT

## 2022-07-07 PROCEDURE — 83690 ASSAY OF LIPASE: CPT

## 2022-07-07 PROCEDURE — 6360000004 HC RX CONTRAST MEDICATION: Performed by: EMERGENCY MEDICINE

## 2022-07-07 PROCEDURE — 74177 CT ABD & PELVIS W/CONTRAST: CPT

## 2022-07-07 RX ADMIN — IOPAMIDOL 100 ML: 755 INJECTION, SOLUTION INTRAVENOUS at 03:59

## 2022-07-07 ASSESSMENT — ENCOUNTER SYMPTOMS
NAUSEA: 1
SORE THROAT: 0
ABDOMINAL PAIN: 1
SHORTNESS OF BREATH: 0
HEMATEMESIS: 0
VOMITING: 1
EYES NEGATIVE: 1
RESPIRATORY NEGATIVE: 1
CONSTIPATION: 0
DIARRHEA: 0
HEMATOCHEZIA: 0
BELCHING: 0
COUGH: 0

## 2022-07-07 ASSESSMENT — PAIN DESCRIPTION - LOCATION: LOCATION: CHEST

## 2022-07-07 ASSESSMENT — PAIN - FUNCTIONAL ASSESSMENT: PAIN_FUNCTIONAL_ASSESSMENT: 0-10

## 2022-07-07 NOTE — ED PROVIDER NOTES
The history is provided by the patient. Abdominal Pain  Pain location:  Epigastric  Pain quality: bloating, dull and fullness    Pain radiates to:  Chest  Pain severity:  Moderate  Onset quality:  Gradual  Timing:  Intermittent  Progression:  Waxing and waning  Chronicity:  New  Context: eating    Context comment:  Had [employer] for dinner and is now has gas. This is ongoing problem for him and he believes he has gall stones. He has not followed up for his suspcion   Relieved by:  Nothing  Worsened by:  Deep breathing, position changes, palpation and eating  Ineffective treatments:  Antacids and lying down  Associated symptoms: anorexia, chest pain, nausea and vomiting    Associated symptoms: no belching, no constipation, no cough, no diarrhea, no dysuria, no fever, no hematemesis, no hematochezia, no hematuria, no shortness of breath and no sore throat        Review of Systems   Constitutional: Negative. Negative for fever. HENT: Negative. Negative for sore throat. Eyes: Negative. Respiratory: Negative. Negative for cough and shortness of breath. Cardiovascular: Positive for chest pain. Gastrointestinal: Positive for abdominal pain, anorexia, nausea and vomiting. Negative for constipation, diarrhea, hematemesis and hematochezia. Genitourinary: Negative. Negative for dysuria and hematuria. Musculoskeletal: Negative. Skin: Negative. Neurological: Negative. All other systems reviewed and are negative.       Family History   Problem Relation Age of Onset    Coronary Art Dis Mother     Hypertension Mother     Heart Attack Mother     Coronary Art Dis Brother      Social History     Socioeconomic History    Marital status:      Spouse name: Not on file    Number of children: Not on file    Years of education: Not on file    Highest education level: Not on file   Occupational History    Not on file   Tobacco Use    Smoking status: Former Smoker     Years: 25.00     Types: Cigarettes, Pipe     Quit date: 1980     Years since quittin.5    Smokeless tobacco: Never Used   Vaping Use    Vaping Use: Never used   Substance and Sexual Activity    Alcohol use: No     Comment: occassional    Drug use: No    Sexual activity: Yes     Partners: Female     Comment:    Other Topics Concern    Not on file   Social History Narrative    Not on file     Social Determinants of Health     Financial Resource Strain:     Difficulty of Paying Living Expenses: Not on file   Food Insecurity:     Worried About Running Out of Food in the Last Year: Not on file    Susan of Food in the Last Year: Not on file   Transportation Needs:     Lack of Transportation (Medical): Not on file    Lack of Transportation (Non-Medical):  Not on file   Physical Activity:     Days of Exercise per Week: Not on file    Minutes of Exercise per Session: Not on file   Stress:     Feeling of Stress : Not on file   Social Connections:     Frequency of Communication with Friends and Family: Not on file    Frequency of Social Gatherings with Friends and Family: Not on file    Attends Rastafari Services: Not on file    Active Member of [address] [place] [address] or Organizations: Not on file    Attends Club or Organization Meetings: Not on file    Marital Status: Not on file   Intimate Partner Violence:     Fear of Current or Ex-Partner: Not on file    Emotionally Abused: Not on file    Physically Abused: Not on file    Sexually Abused: Not on file   Housing Stability:     Unable to Pay for Housing in the Last Year: Not on file    Number of [place] in the Last Year: Not on file    Unstable Housing in the Last Year: Not on file     Past Surgical History:   Procedure Laterality Date    [facility] CATH LAB PROCEDURE   10/6/03;10/16/03;10/5/04;3/12    PTCA with stents; 3-12 cont med therapy     Past Medical History:   Diagnosis Date    Arthritis     Back pain, chronic     BPH (benign prostatic hyperplasia)     CAD (coronary artery disease)     stents RCA, Cx, LAD    Cancer ([facility] [address] 12/3/12    skin cancer removed    Family history of coronary artery disease     H/O cardiovascular stress test 6/6/17, 8/18/20    EF 50-60%. Mild anterior and septal wall ischemia.  H/O Doppler ultrasound 08/13/2019    No evidence of DVT or SVT. There appears to be calcified thrombus with in the right small saphenous vein. Significant reflux noted of the Right CFV 5.0s.    H/O echocardiogram 1/12; 8/13/2019    EF 50-55%. Diastolic Dysfunction Grade I . Sclerotic, but non-stenotic aortic valve. Aortic root dimension upper limits of normal 3.7cm.  Hyperlipidemia     Hypertension     Leg swelling 8/5/2019    PAF (paroxysmal atrial fibrillation) ([facility]     Palpitation 4/25/2022    S/P angioplasty with stent 10/6/03;10/16/03;10/5/04    stent RCA; stent CX; stent LAD 2003 & 2004    SOB (shortness of breath) 6/6/2017    Torsade de pointes 1/8/2012     No Known Allergies  Prior to Admission medications    Medication Sig Start Date End Date Taking? Authorizing Provider   clopidogrel (PLAVIX) 75 MG tablet Take 1 tablet by mouth daily 6/30/22   Lorri Gutierrez MD   amiodarone (CORDARONE) 200 MG tablet TAKE 0.5 TABLETS BY MOUTH DAILY 6/21/22   Lorri Gutierrez MD   magnesium oxide (MAG-OX) 400 MG tablet Take 1 tablet by mouth daily 6/6/22   Lorri Gutierrez MD   amLODIPine-benazepril (LOTREL) 5-20 MG per capsule Take 1 capsule by mouth daily 6/6/22   Lorri Gutierrez MD   atorvastatin (LIPITOR) 40 MG tablet Take 1 tablet by mouth daily 6/6/22   Lorri Gutierrez MD   indapamide (LOZOL) 1.25 MG tablet Take 1 tablet by mouth every morning 6/6/22   Lorri Gutierrez MD   multivitamin [facility]) per tablet Take 1 tablet by mouth daily.       Historical Provider, MD   aspirin 81 MG EC tablet Take 81 mg by mouth daily      Historical Provider, MD       BP (!) 156/71   Pulse 63   Temp 97.7 °F (36.5 °C) (Oral)   Resp 18   Wt 180 lb (81.6 kg)   SpO2 97%   BMI 25.10 kg/m²     Physical Exam  Vitals and nursing note reviewed. Constitutional:       General: He is not in acute distress. Appearance: Normal appearance. He is well-developed. He is not ill-appearing, toxic-appearing or diaphoretic. HENT:      Head: Normocephalic and atraumatic. Right Ear: External ear normal.      Left Ear: External ear normal.      Nose: Nose normal.   Eyes:      Conjunctiva/sclera: Conjunctivae normal.      Pupils: Pupils are equal, round, and reactive to light. Cardiovascular:      Rate and Rhythm: Normal rate and regular rhythm. Heart sounds: Normal heart sounds. Pulmonary:      Effort: Pulmonary effort is normal.      Breath sounds: Normal breath sounds. Abdominal:      General: Bowel sounds are normal. There is no distension. Palpations: Abdomen is soft. Tenderness: There is generalized abdominal tenderness. There is no right CVA tenderness, left CVA tenderness, guarding or rebound. Negative signs include Davis's sign, Rovsing's sign, McBurney's sign, psoas sign and obturator sign. Musculoskeletal:         General: Normal range of motion. Cervical back: Normal range of motion and neck supple. Skin:     General: Skin is warm and dry. Neurological:      Mental Status: He is alert and oriented to person, place, and time. GCS: GCS eye subscore is 4. GCS verbal subscore is 5. GCS motor subscore is 6. Psychiatric:         Behavior: Behavior normal.         Thought Content:  Thought content normal.         Judgment: Judgment normal.         MDM:    Labs Reviewed   CBC WITH AUTO DIFFERENTIAL - Abnormal; Notable for the following components:       Result Value    WBC 14.0 (*)     RBC 4.50 (*)     Hemoglobin 12.9 (*)     Hematocrit 39.2 (*)     MPV 11.7 (*)     Segs Relative 78.8 (*)     Lymphocytes % 7.8 (*) Monocytes % 9.8 (*)     Immature Neutrophil % 0.6 (*)     All other components within normal limits   COMPREHENSIVE METABOLIC PANEL - Abnormal; Notable for the following components:    BUN 34 (*)     Glucose 101 (*)     GFR Non- 53 (*)     All other components within normal limits   LIPASE   TROPONIN       CT ABDOMEN PELVIS W IV CONTRAST   Preliminary Result   1. No acute intra-abdominal abnormality. 2. Severe atherosclerosis. 3. Normal appendix. 4. Cholelithiasis. 5. Small hiatal hernia. XR CHEST PORTABLE   Final Result   1. No acute cardiopulmonary disease. Patient had a few episodes of vomiting which explains the elevated wbc. His lft is normal and his CT confirms cholelithiasis without obstruction or cholecystitis. I have discussed with the patient  my clinical impression and the result of the patient's current clinical evaluation for their presentation. In addition we discussed the risk and benefits of further testing and hospitalization. I discussed candidly with the patient  and the patient  was allowed to provide input as to their thoughts concerning the current presentation. Although the risk of progression or development of new more serious signs and symptoms cannot be excluded the current presentation to the emergency department appears to be non acute and have no pathology. This can change and changes of concern was discussed with the patient. Including signs of cholecystitis or worsening liver function. My original thought was to admit and transfer to [facility] for observation after consultation with surgery on call. THe patient understands the risks an he would rather follow up. I am going to try and work with the patient and his wishes but within my strict guidelines of care and warnings. My typical dicussion, presentation,and considerations for this patients' chief complaint, diagnosis, and differential diagnosis have been considered.  I have stressed need for follow up and reexamination for this encounter. I have discussed my clinical impression and the results of the current evaluation. Patient was  prescribed zofran. The medication(s) use,  medication(s) safety and medication(s) interactions with already prescribed medication(s) have been explained and outlined for this encounter. The patient  was educated that it is their responsibility to verify this information is correct at the time of discharge and to contact this department of any complications with the pharmacy providing this medication(s) or if their any difficulty in obtaining this medication(s). Final Impression    1.  Calculus of gallbladder without cholecystitis without obstruction              [address] [facility],   07/07/22 [id number]

## 2022-07-13 ENCOUNTER — OFFICE VISIT (OUTPATIENT)
Dept: SURGERY | Age: 82
End: 2022-07-13
Payer: MEDICARE

## 2022-07-13 VITALS
BODY MASS INDEX: 25.2 KG/M2 | SYSTOLIC BLOOD PRESSURE: 148 MMHG | DIASTOLIC BLOOD PRESSURE: 74 MMHG | HEART RATE: 85 BPM | WEIGHT: 180 LBS | HEIGHT: 71 IN

## 2022-07-13 DIAGNOSIS — K80.10 CCC (CHRONIC CALCULOUS CHOLECYSTITIS): Primary | ICD-10-CM

## 2022-07-13 PROCEDURE — G8427 DOCREV CUR MEDS BY ELIG CLIN: HCPCS | Performed by: SURGERY

## 2022-07-13 PROCEDURE — 1036F TOBACCO NON-USER: CPT | Performed by: SURGERY

## 2022-07-13 PROCEDURE — 1123F ACP DISCUSS/DSCN MKR DOCD: CPT | Performed by: SURGERY

## 2022-07-13 PROCEDURE — 99214 OFFICE O/P EST MOD 30 MIN: CPT | Performed by: SURGERY

## 2022-07-13 PROCEDURE — G8417 CALC BMI ABV UP PARAM F/U: HCPCS | Performed by: SURGERY

## 2022-07-13 ASSESSMENT — ENCOUNTER SYMPTOMS
COLOR CHANGE: 0
STRIDOR: 0
CHOKING: 0
CONSTIPATION: 0
EYE REDNESS: 0
ABDOMINAL PAIN: 1
ANAL BLEEDING: 0
PHOTOPHOBIA: 0
EYE ITCHING: 0
RECTAL PAIN: 0
SORE THROAT: 0
BACK PAIN: 0
APNEA: 0

## 2022-07-13 NOTE — PROGRESS NOTES
Chief Complaint   Patient presents with    Follow-up     Gallstones - ER 7/7/22, CT in Chart       SUBJECTIVE:  HPI: Patient complains ofabdominal pain. Pain is located in the RUQ with radiation to the back. Pt has grilled cheese with potato chips and ended up in ER. The pain is described as cramping, pressure-like and sharp. Onset was several years ago. Symptoms havebeen unchanged since. Aggravating factors: fatty foods. Associated symptoms: diarrhea. The patient denies nausea and vomiting. I have reviewed the patient's(pertinent information to this visit) medical history, family history(scanned in  the Media tab under \"patient questioner\"), social history and review of systems with the patienttoday in the office. Past Surgical History:   Procedure Laterality Date    ABDOMINAL HERNIA REPAIR      CATARACT REMOVAL      CORONARY ANGIOPLASTY WITH STENT PLACEMENT      DIAGNOSTIC CARDIAC CATH LAB PROCEDURE   10/6/03;10/16/03;10/5/04;3/12    PTCA with stents; 3-12 cont med therapy     Past Medical History:   Diagnosis Date    Arthritis     Back pain, chronic     BPH (benign prostatic hyperplasia)     CAD (coronary artery disease)     stents RCA, Cx, LAD    Cancer ([address] 12/3/12    skin cancer removed    Family history of coronary artery disease     H/O cardiovascular stress test 6/6/17, 8/18/20    EF 50-60%. Mild anterior and septal wall ischemia.  H/O Doppler ultrasound 08/13/2019    No evidence of DVT or SVT. There appears to be calcified thrombus with in the right small saphenous vein. Significant reflux noted of the Right CFV 5.0s.    H/O echocardiogram 1/12; 8/13/2019    EF 50-55%. Diastolic Dysfunction Grade I . Sclerotic, but non-stenotic aortic valve. Aortic root dimension upper limits of normal 3.7cm.      Hyperlipidemia     Hypertension     Leg swelling 8/5/2019    PAF (paroxysmal atrial fibrillation) ([address]     Palpitation 4/25/2022    S/P angioplasty with stent 10/6/03;10/16/03;10/5/04    stent RCA; stent CX; stent LAD  &     SOB (shortness of breath) 2017    Torsade de pointes 2012     Family History   Problem Relation Age of Onset    Coronary Art Dis Mother     Hypertension Mother     Heart Attack Mother     Coronary Art Dis Brother      Social History     Socioeconomic History    Marital status:      Spouse name: Not on file    Number of children: Not on file    Years of education: Not on file    Highest education level: Not on file   Occupational History    Not on file   Tobacco Use    Smoking status: Former Smoker     Years: 25.00     Types: Cigarettes, Pipe     Quit date: 1980     Years since quittin.5    Smokeless tobacco: Never Used   Vaping Use    Vaping Use: Never used   Substance and Sexual Activity    Alcohol use: No     Comment: occassional    Drug use: No    Sexual activity: Yes     Partners: Female     Comment:    Other Topics Concern    Not on file   Social History Narrative    Not on file     Social Determinants of Health     Financial Resource Strain:     Difficulty of Paying Living Expenses: Not on file   Food Insecurity:     Worried About Running Out of Food in the Last Year: Not on file    Susan of Food in the Last Year: Not on file   Transportation Needs:     Lack of Transportation (Medical): Not on file    Lack of Transportation (Non-Medical):  Not on file   Physical Activity:     Days of Exercise per Week: Not on file    Minutes of Exercise per Session: Not on file   Stress:     Feeling of Stress : Not on file   Social Connections:     Frequency of Communication with Friends and Family: Not on file    Frequency of Social Gatherings with Friends and Family: Not on file    Attends Muslim Services: Not on file    Active Member of Clubs or Organizations: Not on file    Attends Club or Organization Meetings: Not on file    Marital Status: Not on file   Intimate Partner Violence:  Fear of Current or Ex-Partner: Not on file    Emotionally Abused: Not on file    Physically Abused: Not on file    Sexually Abused: Not on file   Housing Stability:     Unable to Pay for Housing in the Last Year: Not on file    Number of Places Lived in the Last Year: Not on file    Unstable Housing in the Last Year: Not on file       Current Outpatient Medications   Medication Sig Dispense Refill    clopidogrel (PLAVIX) 75 MG tablet Take 1 tablet by mouth daily 30 tablet 5    amiodarone (CORDARONE) 200 MG tablet TAKE 0.5 TABLETS BY MOUTH DAILY 45 tablet 3    amLODIPine-benazepril (LOTREL) 5-20 MG per capsule Take 1 capsule by mouth daily 30 capsule 5    atorvastatin (LIPITOR) 40 MG tablet Take 1 tablet by mouth daily 90 tablet 3    indapamide (LOZOL) 1.25 MG tablet Take 1 tablet by mouth every morning 90 tablet 3    multivitamin (THERAGRAN) per tablet Take 1 tablet by mouth daily.  aspirin 81 MG EC tablet Take 81 mg by mouth daily        magnesium oxide (MAG-OX) 400 MG tablet Take 1 tablet by mouth daily (Patient not taking: Reported on 7/13/2022) 90 tablet 3     No current facility-administered medications for this visit. No Known Allergies    Review of Systems:         Review of Systems   Constitutional: Negative for chills and fever. HENT: Negative for ear pain, mouth sores, sore throat and tinnitus. Eyes: Negative for photophobia, redness and itching. Respiratory: Negative for apnea, choking and stridor. Cardiovascular: Negative for chest pain and palpitations. Gastrointestinal: Positive for abdominal pain. Negative for anal bleeding, constipation and rectal pain. Endocrine: Negative for polydipsia. Genitourinary: Negative for enuresis, flank pain and hematuria. Musculoskeletal: Negative for back pain, joint swelling and myalgias. Skin: Negative for color change and pallor. Allergic/Immunologic: Negative for environmental allergies.    Neurological: Negative for syncope and speech difficulty. Psychiatric/Behavioral: Negative for confusion and hallucinations. OBJECTIVE:  Physical Exam:    BP (!) 148/74 (Site: Left Upper Arm, Position: Sitting, Cuff Size: Medium Adult)   Pulse 85   Ht 5' 11\" (1.803 m)   Wt 180 lb (81.6 kg)   BMI 25.10 kg/m²      Physical Exam  Constitutional:       Appearance: He is well-developed. HENT:      Head: Normocephalic. Eyes:      Pupils: Pupils are equal, round, and reactive to light. Cardiovascular:      Rate and Rhythm: Normal rate. Pulmonary:      Effort: Pulmonary effort is normal.   Abdominal:      General: There is no distension. Palpations: Abdomen is soft. There is no mass. Tenderness: There is abdominal tenderness. There is no guarding or rebound. Musculoskeletal:         General: Normal range of motion. Cervical back: Normal range of motion and neck supple. Skin:     General: Skin is warm. Neurological:      Mental Status: He is alert and oriented to person, place, and time. ASSESSMENT:     1. CCC (chronic calculous cholecystitis)          PLAN:  Treatment:  Will proceed with shante coyne. Dr Dora Mahajan started him on plavix for possible stroke. Pt is sched to have carotid u/s 7/21st . Patient counseled on risks, benefits, and alternatives oftreatment plan at length today. Patient states an understanding and willingness to proceed with plan. No orders of the defined types were placed in this encounter. No orders of the defined types were placed in this encounter. Follow Up:  No follow-ups on file.        Inna Banda MD

## 2022-07-18 ENCOUNTER — TELEPHONE (OUTPATIENT)
Dept: SURGERY | Age: 82
End: 2022-07-18

## 2022-07-18 NOTE — TELEPHONE ENCOUNTER
SPOKE  [address] (JOSTIN CANALES) SCHEDULED @ [facility].  NOTIFIED OF DATES, TIMES AND LOCATION    PHONE ASSESSMENT   SURGERY - 7/26/22 @ 900  P/O - 8/10/22 @ 558    NPO AFTER MIDNIGHT  HOLD BLOOD THINNERS - PLAVIX HOLD 5 DAYS PRIOR

## 2022-07-19 ENCOUNTER — HOSPITAL ENCOUNTER (OUTPATIENT)
Age: 82
Discharge: HOME | End: 2022-07-19
Payer: MEDICARE

## 2022-07-19 LAB
ALBUMIN SERPL-MCNC: 4.2 GM/DL (ref 3.4–5)
ALP BLD-CCNC: 95 IU/L (ref 40–129)
ALT SERPL-CCNC: 17 U/L (ref 10–40)
ANION GAP SERPL CALCULATED.3IONS-SCNC: 12 MMOL/L (ref 4–16)
AST SERPL-CCNC: 17 IU/L (ref 15–37)
BILIRUB SERPL-MCNC: 0.8 MG/DL (ref 0–1)
BUN BLDV-MCNC: 37 MG/DL (ref 6–23)
CALCIUM SERPL-MCNC: 10 MG/DL (ref 8.3–10.6)
CHLORIDE BLD-SCNC: 106 MMOL/L (ref 99–110)
CHOLESTEROL, FASTING: 98 MG/DL
CO2: 27 MMOL/L (ref 21–32)
CREAT SERPL-MCNC: 1.4 MG/DL (ref 0.9–1.3)
GFR AFRICAN AMERICAN: 59 ML/MIN/1.73M2
GFR NON-AFRICAN AMERICAN: 48 ML/MIN/1.73M2
GLUCOSE FASTING: 104 MG/DL (ref 70–99)
HDLC SERPL-MCNC: 34 MG/DL
LDL CHOLESTEROL CALCULATED: 44 MG/DL
POTASSIUM SERPL-SCNC: 4 MMOL/L (ref 3.5–5.1)
SODIUM BLD-SCNC: 145 MMOL/L (ref 135–145)
TOTAL PROTEIN: 6.3 GM/DL (ref 6.4–8.2)
TRIGLYCERIDE, FASTING: 99 MG/DL
TSH HIGH SENSITIVITY: 1.33 UIU/ML (ref 0.27–4.2)

## 2022-07-19 PROCEDURE — 36415 COLL VENOUS BLD VENIPUNCTURE: CPT

## 2022-07-19 PROCEDURE — 84443 ASSAY THYROID STIM HORMONE: CPT

## 2022-07-19 PROCEDURE — 80061 LIPID PANEL: CPT

## 2022-07-19 PROCEDURE — 80053 COMPREHEN METABOLIC PANEL: CPT

## 2022-07-19 NOTE — PROGRESS NOTES
.Surgery @ [facility] on 7/26/22 you will be called 7/25/22 with times               1. Do not eat or drink anything after midnight - unless instructed by your doctor prior to surgery. This includes                   no water, chewing gum or mints. 2. Follow your directions as prescribed by the doctor for your procedure and medications. Take Amiodarone morning of surgery with a sip of water. 3. Check with your Doctor regarding stopping vitamins, supplements, blood thinners (Plavix, Coumadin, Lovenox, Effient, Pradaxa, Xarelto, Fragmin or                   other blood thinners) and follow their instructions. Last dose Plavix, aspirin, vitamins & supplements: 7/22/22   4. Do not smoke, vape or use chewing tobacco morning of surgery. Do not drink any alcoholic beverages 24 hours prior to surgery. This includes NA Beer. No street drugs 7 days prior to surgery. 5. You may brush your teeth and gargle the morning of surgery. DO NOT SWALLOW WATER   6. You MUST make arrangements for a responsible adult to take you home after your surgery and be able to check on you every couple                   hours for the day. You will not be allowed to leave alone or drive yourself home. It is strongly suggested someone stay with you the first 24                   hrs. Your surgery will be cancelled if you do not have a ride home. 7. Please wear simple, loose fitting clothing to the hospital.  [place] Turk not bring valuables (money, credit cards, checkbooks, etc.) Do not wear any                   makeup (including no eye makeup) or nail polish on your fingers or toes. 8. DO NOT wear any jewelry or piercings on day of surgery. All body piercing jewelry must be removed. 9. If you have dentures, they will be removed before going to the OR; we will provide you a container. If you wear contact lenses or glasses,                  they will be removed; please bring a case for them.            10. If you have a Living Will and Durable Power of  for Healthcare, please bring in a copy. 11. Please bring picture ID,  insurance card, paperwork from the doctors office    (H & P, Consent, & card for implantable devices). 12. Take a shower the morning of your procedure with Hibiclens or an anti-bacterial soap. Do not apply any make-up, deodorant, lotion, oil or powder. 13.  Enter thru the main entrance wearing a mask on the day of surgery.

## 2022-07-21 ENCOUNTER — PROCEDURE VISIT (OUTPATIENT)
Dept: CARDIOLOGY CLINIC | Age: 82
End: 2022-07-21
Payer: MEDICARE

## 2022-07-21 DIAGNOSIS — I63.9 CEREBROVASCULAR ACCIDENT (CVA), UNSPECIFIED MECHANISM (HCC): Primary | ICD-10-CM

## 2022-07-21 PROCEDURE — 93880 EXTRACRANIAL BILAT STUDY: CPT | Performed by: INTERNAL MEDICINE

## 2022-07-22 ENCOUNTER — TELEPHONE (OUTPATIENT)
Dept: CARDIOLOGY CLINIC | Age: 82
End: 2022-07-22

## 2022-07-25 ENCOUNTER — ANESTHESIA EVENT (OUTPATIENT)
Dept: OPERATING ROOM | Age: 82
End: 2022-07-25
Payer: MEDICARE

## 2022-07-25 ASSESSMENT — ENCOUNTER SYMPTOMS: SHORTNESS OF BREATH: 1

## 2022-07-25 NOTE — ANESTHESIA PRE PROCEDURE
Department of Anesthesiology  Preprocedure Note       Name:  Maribeth Peter   Age:  78 y.o.  :  1940                                          MRN:  [id number]         Date:  2022      Surgeon: Mary Jane Signs):  Aniceto Schultz MD    Procedure: Procedure(s):  CHOLECYSTECTOMY LAPAROSCOPIC    Medications prior to admission:   Prior to Admission medications    Medication Sig Start Date End Date Taking? Authorizing Provider   clopidogrel (PLAVIX) 75 MG tablet Take 1 tablet by mouth daily 22   Fern Valderrama MD   amiodarone (CORDARONE) 200 MG tablet TAKE 0.5 TABLETS BY MOUTH DAILY 22   Fern Valderrama MD   magnesium oxide (MAG-OX) 400 MG tablet Take 1 tablet by mouth daily  Patient not taking: Reported on 2022   Fern Valderrama MD   amLODIPine-benazepril (LOTREL) 5-20 MG per capsule Take 1 capsule by mouth daily 22   Fern Valderrama MD   atorvastatin (LIPITOR) 40 MG tablet Take 1 tablet by mouth daily 22   Fern Valderrama MD   indapamide (LOZOL) 1.25 MG tablet Take 1 tablet by mouth every morning 22   Fern Valderrama MD   multivitamin [facility]) per tablet Take 1 tablet by mouth daily. Historical Provider, MD   aspirin 81 MG EC tablet Take 81 mg by mouth daily      Historical Provider, MD       Current medications:    No current facility-administered medications for this encounter.      Current Outpatient Medications   Medication Sig Dispense Refill    clopidogrel (PLAVIX) 75 MG tablet Take 1 tablet by mouth daily 30 tablet 5    amiodarone (CORDARONE) 200 MG tablet TAKE 0.5 TABLETS BY MOUTH DAILY 45 tablet 3    magnesium oxide (MAG-OX) 400 MG tablet Take 1 tablet by mouth daily (Patient not taking: Reported on 2022) 90 tablet 3    amLODIPine-benazepril (LOTREL) 5-20 MG per capsule Take 1 capsule by mouth daily 30 capsule 5    atorvastatin (LIPITOR) 40 MG tablet Take 1 tablet by mouth daily 90 tablet 3    indapamide (LOZOL) 1.25 MG tablet Take 1 tablet by  DIAGNOSTIC CARDIAC CATH LAB PROCEDURE   10/6/03;10/16/03;10/5/04;3/12    PTCA with stents; 3-12 cont med therapy       Social History:    Social History     Tobacco Use    Smoking status: Former     Years: 25.00     Types: Cigarettes, Pipe     Quit date: 1980     Years since quittin.5    Smokeless tobacco: Never   Substance Use Topics    Alcohol use: No     Comment: occassional                                Counseling given: Not Answered      Vital Signs (Current):   Vitals:    22 1435   Weight: 180 lb (81.6 kg)   Height: 5' 11\" (1.803 m)                                              BP Readings from Last 3 Encounters:   22 (!) 148/74   22 (!) 156/71   22 122/70       NPO Status:                                                                                 BMI:   Wt Readings from Last 3 Encounters:   22 180 lb (81.6 kg)   22 180 lb (81.6 kg)   22 180 lb (81.6 kg)     Body mass index is 25.1 kg/m². CBC:   Lab Results   Component Value Date/Time    WBC 14.0 2022 02:30 AM    RBC 4.50 2022 02:30 AM    HGB 12.9 2022 02:30 AM    HCT 39.2 2022 02:30 AM    MCV 87.1 2022 02:30 AM    RDW 14.0 2022 02:30 AM     2022 02:30 AM       CMP:   Lab Results   Component Value Date/Time     2022 09:30 AM    K 4.0 2022 09:30 AM     2022 09:30 AM    CO2 27 2022 09:30 AM    BUN 37 2022 09:30 AM    CREATININE 1.4 2022 09:30 AM    GFRAA 59 2022 09:30 AM    LABGLOM 48 2022 09:30 AM    GLUCOSE 101 2022 02:30 AM    PROT 6.3 2022 09:30 AM    PROT 6.5 2012 02:30 PM    CALCIUM 10.0 2022 09:30 AM    BILITOT 0.8 2022 09:30 AM    ALKPHOS 95 2022 09:30 AM    AST 17 2022 09:30 AM    ALT 17 2022 09:30 AM       POC Tests: No results for input(s): POCGLU, POCNA, POCK, POCCL, POCBUN, POCHEMO, POCHCT in the last 72 hours.     Coags:   Lab Results   Component Value Date/Time    PROTIME 9.3 06/05/2013 05:15 PM    PROTIME 9.8 03/26/2012 08:45 AM    INR 0.96 06/05/2013 05:15 PM    APTT 61.5 06/05/2013 05:15 PM       HCG (If Applicable): No results found for: PREGTESTUR, PREGSERUM, HCG, HCGQUANT     ABGs: No results found for: PHART, PO2ART, YAB6FUW, AMF2NBP, BEART, [id number]     Type & Screen (If Applicable):  No results found for: LABABO, LABRH    Drug/Infectious Status (If Applicable):  No results found for: HIV, HEPCAB    COVID-19 Screening (If Applicable): No results found for: COVID19        Anesthesia Evaluation  Patient summary reviewed  Airway: Mallampati: III  TM distance: >3 FB   Neck ROM: full  Mouth opening: > = 3 FB   Dental:          Pulmonary:   (+) shortness of breath:  decreased breath sounds                            Cardiovascular:    (+) hypertension:, CAD:, CABG/stent:, dysrhythmias: atrial fibrillation, hyperlipidemia      ECG reviewed  Rhythm: irregular      Stress test reviewed  Cleared by cardiology           ROS comment:  Normal sinus rhythm with sinus arrhythmia   Left axis deviation   Inferior infarct (cited on or before 13-APR-2022)   Abnormal ECG   When compared with ECG of 13-APR-2022 10:17,   premature ventricular complexes are no longer present   premature atrial complexes are no longer present   Borderline criteria for Anterior infarct are no longer present   Nonspecific T wave abnormality no longer evident in Lateral leads   Confirmed by DAVID Aguilar ([id number]) on 7/7/2022 3:53:36 PM  Sinus rhythm with occasional premature ventricular complexes and premature atrial complexes   Inferior infarct , age undetermined   Possible Anterior infarct , age undetermined   Abnormal ECG   No previous ECGs available   Confirmed by [facility] MD, [facility] ([id number]) on 4/13/2022 5:09:03 PM   [facility] physician Dr. Mylene Linton .  Kishore Reyes anterior and septal wall ischemia.  Normal Left ventricular size, wall   motion and systolic function. Ejection fraction is 56 %.     Recommendation   Continue aggressive lifestyle and risk modification and medical therapy.      Signatures      ------------------------------------------------------------------   Electronically signed by Rogelio Beavers MD   (Interpreting cardiologist) on 08/18/2020 at 17:32   ------------------------------------------------------------------            Neuro/Psych:   (+) CVA:,              ROS comment: Occular  GI/Hepatic/Renal:             Endo/Other:    (+) blood dyscrasia: anticoagulation therapy, arthritis: OA., malignancy/cancer. Abdominal:             Vascular:   + DVT, . Other Findings:           Anesthesia Plan      general     ASA 4     (Dc plavix last wed)  Induction: intravenous. MIPS: Postoperative opioids intended. Anesthetic plan and risks discussed with patient. Plan discussed with CRNA.     Attending anesthesiologist reviewed and agrees with Preprocedure content                HARDEEP Sellers CRNA   7/25/2022

## 2022-07-25 NOTE — H&P
General Surgery - H&P  Dr. Jimmy Paulson PA-C      The patient was seen and examined. There have been no changes to the H&P since the patient was seen in the office on 7/13/. We will proceed with laparoscopic cholecystectomy. The patient was counseled at length about the risks of dania Covid-19 during their perioperative period and any recovery window from their procedure. The patient was made aware that dania Covid-19  may worsen their prognosis for recovering from their procedure  and lend to a higher morbidity and/or mortality risk. All material risks, benefits, and reasonable alternatives including postponing the procedure were discussed. The patient does wish to proceed with the procedure at this time.       Abdulkadir Jimenez PA-C

## 2022-07-25 NOTE — PROGRESS NOTES
7/25/22 - Unable to contact patient, phone rings busy. Talked to his son Yuliana Gallagher, notified him surgery for his dad on 7/26/22 is at [address] [place] [address], arrival 5. Yuliana Gallagher will stop at his parents house with the information and have them call me.    7/25/22 @ [phone]:  Patient called back, surgery times given (1095/3804), his cell phone # was added to the chart, he verbalized understanding on times.

## 2022-07-26 ENCOUNTER — ANESTHESIA (OUTPATIENT)
Dept: OPERATING ROOM | Age: 82
End: 2022-07-26
Payer: MEDICARE

## 2022-07-26 ENCOUNTER — HOSPITAL ENCOUNTER (OUTPATIENT)
Age: 82
Setting detail: OUTPATIENT SURGERY
Discharge: HOME | End: 2022-07-26
Attending: SURGERY | Admitting: SURGERY
Payer: MEDICARE

## 2022-07-26 VITALS
TEMPERATURE: 97 F | OXYGEN SATURATION: 97 % | WEIGHT: 180 LBS | RESPIRATION RATE: 18 BRPM | HEART RATE: 59 BPM | BODY MASS INDEX: 25.2 KG/M2 | HEIGHT: 71 IN | SYSTOLIC BLOOD PRESSURE: 138 MMHG | DIASTOLIC BLOOD PRESSURE: 72 MMHG

## 2022-07-26 DIAGNOSIS — K80.10 CCC (CHRONIC CALCULOUS CHOLECYSTITIS): Primary | ICD-10-CM

## 2022-07-26 PROCEDURE — 7100000011 HC PHASE II RECOVERY - ADDTL 15 MIN: Performed by: SURGERY

## 2022-07-26 PROCEDURE — 2580000003 HC RX 258

## 2022-07-26 PROCEDURE — 7100000000 HC PACU RECOVERY - FIRST 15 MIN: Performed by: SURGERY

## 2022-07-26 PROCEDURE — 7100000010 HC PHASE II RECOVERY - FIRST 15 MIN: Performed by: SURGERY

## 2022-07-26 PROCEDURE — 6360000002 HC RX W HCPCS: Performed by: ANESTHESIOLOGY

## 2022-07-26 PROCEDURE — 47562 LAPAROSCOPIC CHOLECYSTECTOMY: CPT | Performed by: SURGERY

## 2022-07-26 PROCEDURE — 3600000004 HC SURGERY LEVEL 4 BASE: Performed by: SURGERY

## 2022-07-26 PROCEDURE — 3700000000 HC ANESTHESIA ATTENDED CARE: Performed by: SURGERY

## 2022-07-26 PROCEDURE — 6370000000 HC RX 637 (ALT 250 FOR IP): Performed by: ANESTHESIOLOGY

## 2022-07-26 PROCEDURE — 7100000001 HC PACU RECOVERY - ADDTL 15 MIN: Performed by: SURGERY

## 2022-07-26 PROCEDURE — 3600000014 HC SURGERY LEVEL 4 ADDTL 15MIN: Performed by: SURGERY

## 2022-07-26 PROCEDURE — APPNB180 APP NON BILLABLE TIME > 60 MINS: Performed by: PHYSICIAN ASSISTANT

## 2022-07-26 PROCEDURE — A4216 STERILE WATER/SALINE, 10 ML: HCPCS

## 2022-07-26 PROCEDURE — 2500000003 HC RX 250 WO HCPCS

## 2022-07-26 PROCEDURE — 2580000003 HC RX 258: Performed by: PHYSICIAN ASSISTANT

## 2022-07-26 PROCEDURE — 3700000001 HC ADD 15 MINUTES (ANESTHESIA): Performed by: SURGERY

## 2022-07-26 PROCEDURE — 2709999900 HC NON-CHARGEABLE SUPPLY: Performed by: SURGERY

## 2022-07-26 PROCEDURE — 2500000003 HC RX 250 WO HCPCS: Performed by: SURGERY

## 2022-07-26 PROCEDURE — 6360000002 HC RX W HCPCS: Performed by: PHYSICIAN ASSISTANT

## 2022-07-26 PROCEDURE — 88304 TISSUE EXAM BY PATHOLOGIST: CPT

## 2022-07-26 PROCEDURE — 2580000003 HC RX 258: Performed by: ANESTHESIOLOGY

## 2022-07-26 PROCEDURE — 47562 LAPAROSCOPIC CHOLECYSTECTOMY: CPT | Performed by: PHYSICIAN ASSISTANT

## 2022-07-26 PROCEDURE — 6360000002 HC RX W HCPCS

## 2022-07-26 RX ORDER — SODIUM CHLORIDE 9 MG/ML
INJECTION INTRAVENOUS PRN
Status: DISCONTINUED | OUTPATIENT
Start: 2022-07-26 | End: 2022-07-26 | Stop reason: SDUPTHER

## 2022-07-26 RX ORDER — DEXAMETHASONE SODIUM PHOSPHATE 4 MG/ML
INJECTION, SOLUTION INTRA-ARTICULAR; INTRALESIONAL; INTRAMUSCULAR; INTRAVENOUS; SOFT TISSUE PRN
Status: DISCONTINUED | OUTPATIENT
Start: 2022-07-26 | End: 2022-07-26 | Stop reason: SDUPTHER

## 2022-07-26 RX ORDER — GLYCOPYRROLATE 0.2 MG/ML
INJECTION INTRAMUSCULAR; INTRAVENOUS PRN
Status: DISCONTINUED | OUTPATIENT
Start: 2022-07-26 | End: 2022-07-26 | Stop reason: SDUPTHER

## 2022-07-26 RX ORDER — ONDANSETRON 2 MG/ML
4 INJECTION INTRAMUSCULAR; INTRAVENOUS
Status: DISCONTINUED | OUTPATIENT
Start: 2022-07-26 | End: 2022-07-26 | Stop reason: HOSPADM

## 2022-07-26 RX ORDER — SODIUM CHLORIDE 0.9 % (FLUSH) 0.9 %
5-40 SYRINGE (ML) INJECTION EVERY 12 HOURS SCHEDULED
Status: DISCONTINUED | OUTPATIENT
Start: 2022-07-26 | End: 2022-07-26 | Stop reason: HOSPADM

## 2022-07-26 RX ORDER — SODIUM CHLORIDE 0.9 % (FLUSH) 0.9 %
5-40 SYRINGE (ML) INJECTION PRN
Status: DISCONTINUED | OUTPATIENT
Start: 2022-07-26 | End: 2022-07-26 | Stop reason: HOSPADM

## 2022-07-26 RX ORDER — ROCURONIUM BROMIDE 10 MG/ML
INJECTION, SOLUTION INTRAVENOUS PRN
Status: DISCONTINUED | OUTPATIENT
Start: 2022-07-26 | End: 2022-07-26 | Stop reason: SDUPTHER

## 2022-07-26 RX ORDER — FENTANYL CITRATE 50 UG/ML
50 INJECTION, SOLUTION INTRAMUSCULAR; INTRAVENOUS EVERY 5 MIN PRN
Status: DISCONTINUED | OUTPATIENT
Start: 2022-07-26 | End: 2022-07-26 | Stop reason: HOSPADM

## 2022-07-26 RX ORDER — ONDANSETRON 2 MG/ML
INJECTION INTRAMUSCULAR; INTRAVENOUS PRN
Status: DISCONTINUED | OUTPATIENT
Start: 2022-07-26 | End: 2022-07-26 | Stop reason: SDUPTHER

## 2022-07-26 RX ORDER — PROPOFOL 10 MG/ML
INJECTION, EMULSION INTRAVENOUS PRN
Status: DISCONTINUED | OUTPATIENT
Start: 2022-07-26 | End: 2022-07-26 | Stop reason: SDUPTHER

## 2022-07-26 RX ORDER — OXYCODONE HYDROCHLORIDE 5 MG/1
5 TABLET ORAL
Status: COMPLETED | OUTPATIENT
Start: 2022-07-26 | End: 2022-07-26

## 2022-07-26 RX ORDER — SODIUM CHLORIDE 9 MG/ML
INJECTION, SOLUTION INTRAVENOUS PRN
Status: DISCONTINUED | OUTPATIENT
Start: 2022-07-26 | End: 2022-07-26 | Stop reason: HOSPADM

## 2022-07-26 RX ORDER — HYDROCODONE BITARTRATE AND ACETAMINOPHEN 5; 325 MG/1; MG/1
1 TABLET ORAL EVERY 6 HOURS PRN
Qty: 12 TABLET | Refills: 0 | Status: SHIPPED | OUTPATIENT
Start: 2022-07-26 | End: 2022-07-29

## 2022-07-26 RX ORDER — SODIUM CHLORIDE, SODIUM LACTATE, POTASSIUM CHLORIDE, CALCIUM CHLORIDE 600; 310; 30; 20 MG/100ML; MG/100ML; MG/100ML; MG/100ML
INJECTION, SOLUTION INTRAVENOUS CONTINUOUS
Status: DISCONTINUED | OUTPATIENT
Start: 2022-07-26 | End: 2022-07-26 | Stop reason: HOSPADM

## 2022-07-26 RX ORDER — SODIUM CHLORIDE 9 MG/ML
25 INJECTION, SOLUTION INTRAVENOUS PRN
Status: DISCONTINUED | OUTPATIENT
Start: 2022-07-26 | End: 2022-07-26 | Stop reason: HOSPADM

## 2022-07-26 RX ORDER — OXYCODONE HYDROCHLORIDE 5 MG/1
10 TABLET ORAL
Status: COMPLETED | OUTPATIENT
Start: 2022-07-26 | End: 2022-07-26

## 2022-07-26 RX ORDER — PHENYLEPHRINE HYDROCHLORIDE 10 MG/ML
INJECTION INTRAVENOUS PRN
Status: DISCONTINUED | OUTPATIENT
Start: 2022-07-26 | End: 2022-07-26 | Stop reason: SDUPTHER

## 2022-07-26 RX ORDER — LIDOCAINE HYDROCHLORIDE 20 MG/ML
INJECTION, SOLUTION INFILTRATION; PERINEURAL CONTINUOUS PRN
Status: DISCONTINUED | OUTPATIENT
Start: 2022-07-26 | End: 2022-07-26 | Stop reason: SDUPTHER

## 2022-07-26 RX ORDER — HYDRALAZINE HYDROCHLORIDE 20 MG/ML
10 INJECTION INTRAMUSCULAR; INTRAVENOUS
Status: DISCONTINUED | OUTPATIENT
Start: 2022-07-26 | End: 2022-07-26 | Stop reason: HOSPADM

## 2022-07-26 RX ORDER — KETOROLAC TROMETHAMINE 30 MG/ML
INJECTION, SOLUTION INTRAMUSCULAR; INTRAVENOUS PRN
Status: DISCONTINUED | OUTPATIENT
Start: 2022-07-26 | End: 2022-07-26 | Stop reason: SDUPTHER

## 2022-07-26 RX ORDER — DEXMEDETOMIDINE HYDROCHLORIDE 4 UG/ML
INJECTION, SOLUTION INTRAVENOUS CONTINUOUS PRN
Status: DISCONTINUED | OUTPATIENT
Start: 2022-07-26 | End: 2022-07-26 | Stop reason: SDUPTHER

## 2022-07-26 RX ORDER — FENTANYL CITRATE 50 UG/ML
25 INJECTION, SOLUTION INTRAMUSCULAR; INTRAVENOUS EVERY 5 MIN PRN
Status: DISCONTINUED | OUTPATIENT
Start: 2022-07-26 | End: 2022-07-26 | Stop reason: HOSPADM

## 2022-07-26 RX ORDER — BUPIVACAINE HYDROCHLORIDE 5 MG/ML
INJECTION, SOLUTION EPIDURAL; INTRACAUDAL
Status: COMPLETED | OUTPATIENT
Start: 2022-07-26 | End: 2022-07-26

## 2022-07-26 RX ORDER — LABETALOL HYDROCHLORIDE 5 MG/ML
10 INJECTION, SOLUTION INTRAVENOUS
Status: DISCONTINUED | OUTPATIENT
Start: 2022-07-26 | End: 2022-07-26 | Stop reason: HOSPADM

## 2022-07-26 RX ORDER — KETAMINE HCL 50MG/ML(1)
SYRINGE (ML) INTRAVENOUS PRN
Status: DISCONTINUED | OUTPATIENT
Start: 2022-07-26 | End: 2022-07-26 | Stop reason: SDUPTHER

## 2022-07-26 RX ADMIN — DEXMEDETOMIDINE HYDROCHLORIDE 0.4 MCG/KG/HR: 4 INJECTION, SOLUTION INTRAVENOUS at 08:49

## 2022-07-26 RX ADMIN — SODIUM CHLORIDE 5 ML: 9 INJECTION, SOLUTION INTRAMUSCULAR; INTRAVENOUS; SUBCUTANEOUS at 09:35

## 2022-07-26 RX ADMIN — DEXAMETHASONE SODIUM PHOSPHATE 8 MG: 4 INJECTION, SOLUTION INTRAMUSCULAR; INTRAVENOUS at 08:45

## 2022-07-26 RX ADMIN — ROCURONIUM BROMIDE 20 MG: 10 INJECTION, SOLUTION INTRAVENOUS at 09:17

## 2022-07-26 RX ADMIN — KETOROLAC TROMETHAMINE 15 MG: 30 INJECTION, SOLUTION INTRAMUSCULAR at 09:35

## 2022-07-26 RX ADMIN — FENTANYL CITRATE 25 MCG: 50 INJECTION, SOLUTION INTRAMUSCULAR; INTRAVENOUS at 10:06

## 2022-07-26 RX ADMIN — PROPOFOL 90 MG: 10 INJECTION, EMULSION INTRAVENOUS at 08:45

## 2022-07-26 RX ADMIN — PHENYLEPHRINE HYDROCHLORIDE 100 MCG: 10 INJECTION INTRAVENOUS at 09:21

## 2022-07-26 RX ADMIN — CEFAZOLIN 2000 MG: 2 INJECTION, POWDER, FOR SOLUTION INTRAMUSCULAR; INTRAVENOUS at 08:39

## 2022-07-26 RX ADMIN — ROCURONIUM BROMIDE 50 MG: 10 INJECTION, SOLUTION INTRAVENOUS at 08:46

## 2022-07-26 RX ADMIN — GLYCOPYRROLATE 0.3 MG: 0.2 INJECTION, SOLUTION INTRAMUSCULAR; INTRAVENOUS at 08:42

## 2022-07-26 RX ADMIN — OXYCODONE HYDROCHLORIDE 5 MG: 5 TABLET ORAL at 11:05

## 2022-07-26 RX ADMIN — ONDANSETRON 4 MG: 2 INJECTION INTRAMUSCULAR; INTRAVENOUS at 09:35

## 2022-07-26 RX ADMIN — SUGAMMADEX 200 MG: 100 INJECTION, SOLUTION INTRAVENOUS at 09:35

## 2022-07-26 RX ADMIN — PHENYLEPHRINE HYDROCHLORIDE 100 MCG: 10 INJECTION INTRAVENOUS at 09:09

## 2022-07-26 RX ADMIN — SODIUM CHLORIDE, POTASSIUM CHLORIDE, SODIUM LACTATE AND CALCIUM CHLORIDE: 600; 310; 30; 20 INJECTION, SOLUTION INTRAVENOUS at 07:29

## 2022-07-26 RX ADMIN — Medication 50 MG: at 08:45

## 2022-07-26 RX ADMIN — LIDOCAINE HYDROCHLORIDE 2 MG/KG/HR: 20 INJECTION, SOLUTION INFILTRATION; PERINEURAL at 08:49

## 2022-07-26 RX ADMIN — FENTANYL CITRATE 25 MCG: 50 INJECTION, SOLUTION INTRAMUSCULAR; INTRAVENOUS at 10:18

## 2022-07-26 ASSESSMENT — PAIN DESCRIPTION - FREQUENCY
FREQUENCY: INTERMITTENT
FREQUENCY: CONTINUOUS

## 2022-07-26 ASSESSMENT — PAIN DESCRIPTION - DESCRIPTORS
DESCRIPTORS: ACHING

## 2022-07-26 ASSESSMENT — PAIN DESCRIPTION - ORIENTATION
ORIENTATION: MID

## 2022-07-26 ASSESSMENT — PAIN DESCRIPTION - ONSET
ONSET: ON-GOING

## 2022-07-26 ASSESSMENT — PAIN DESCRIPTION - LOCATION
LOCATION: ABDOMEN

## 2022-07-26 ASSESSMENT — PAIN SCALES - GENERAL
PAINLEVEL_OUTOF10: 6
PAINLEVEL_OUTOF10: 6
PAINLEVEL_OUTOF10: 4
PAINLEVEL_OUTOF10: 0
PAINLEVEL_OUTOF10: 4
PAINLEVEL_OUTOF10: 5
PAINLEVEL_OUTOF10: 6
PAINLEVEL_OUTOF10: 5

## 2022-07-26 ASSESSMENT — PAIN - FUNCTIONAL ASSESSMENT
PAIN_FUNCTIONAL_ASSESSMENT: ACTIVITIES ARE NOT PREVENTED

## 2022-07-26 NOTE — PROGRESS NOTES
0945- pt. Arrived to pacu via cart. Attached to monitor and alarms on. Received report from Babs Dietrich CRNA and Sofy Bravo [facility]. Pt. Is drowsy from anesthesia but does respond to verbal stimuli and able to follow commands. Pt. Is wearing NC at 2L. He is SR on the monitor. Pt. Lung sounds diminished throughout. SCDs are in place and turned on. He has 3 incision sites to abdomen that are glued. No drainage or redness noted. IV infusing into right hand without any issues. At this time pt. Denied pain or n/v. Will continue to monitor. 1006- pt. Has complaints of aching pain rating 6/10 to abdomen. Pt. Does have PRN orders. Will medicate per orders. 1018- pt. States first dose of medication did not seem to help. Would like something for pain. Will medicate per orders. 1030- pt. Is awake and alert. He states the pain medication did help and that the pain is now more tolerable. He denies n/v and was able to eat a few ice chips. Pt. Is on RA  sating 96%. Lung sounds clear. Pt. Is SR/SB on the monitor. Incision sites are clean dry and intact. No redness or drainage noted. At this time pt. Is stable and able to be transferred to [facility] for phase II of care. Notified receiving nurse, John Harmon.

## 2022-07-26 NOTE — DISCHARGE INSTRUCTIONS
Patient Discharge Instructions  Dr. Taniya Silva  [phone]    Discharge Date:  7/26/2022    Discharged To: Home      RESUME ACTIVITY:      BATHING:   Recommend showering daily but no bath tub or submerging incision under water    Wound:    Keep wound dry and clean. May shower as instructed above. Dressing: Your incisions are covered with glue that will fall off with time. You may leave these incisions uncovered with any additional dressings    DRIVING:   3-5 days. No driving until off narcotic pain medications and walking comfortably    RETURN TO WORK: when cleared after your follow up office visit    WALKING:    As tolerated     STAIRS:    As tolerated    LIFTING:   Less than 10 pounds for one week    DIET:    Regular diet as tolerated. SPECIAL INSTRUCTIONS:     Call the office at [phone]  if you have a fever greater than or equal to 101 oF or if your incision becomes red, tender, or has drainage of pus. If follow up appointment was not given to you, call the Surgical Clinic at [phone] for follow up appointment with Dr. Dennie Britain or Sujata Ch in:  1-2 weeks. [facility]  [phone]    Do not drive, work around [address] [place], [place] [address]th  or use equipment. Do not drink any alcoholic beverages. Do not smoke while alone. Avoid making important decisions. Plan to spend a quiet, relaxed evening @ home. Resume normal activities as you begin to feel better. Eat lightly for your first meal, then gradually increase your diet to what is normal for you. In case of nausea, avoid food and drink only clear liquids. Resume food as nausea ceases. Notify your surgeon if you experience fever, chills, large amount of bleeding, difficulty breathing, persistent nausea and vomiting or any other disturbing problem. Call for a follow-up appointment with your surgeon.

## 2022-07-26 NOTE — BRIEF OP NOTE
Brief Postoperative Note      Patient: Sachin aLra  YOB: 1940  MRN: [id number]    Date of Procedure: 7/26/2022    Pre-Op Diagnosis: CCC (chronic calculous cholecystitis) [K80.10]    Post-Op Diagnosis: Same       Procedure(s):  CHOLECYSTECTOMY LAPAROSCOPIC    Surgeon(s):  Verona Maurice MD    Assistant:  Physician Assistant: Juan Ashton PA-C    Anesthesia: General    Estimated Blood Loss (mL): Minimal    Complications: None    Specimens:   ID Type Source Tests Collected by Time Destination   A : Gallbladder and contents Tissue Gallbladder SURGICAL PATHOLOGY Verona Maurice MD 7/26/2022 0446        Implants:  * No implants in log *      Drains: * No LDAs found *    Findings: L and R inguinal hernia    Electronically signed by Juan Ashton PA-C on 7/26/2022 at 9:45 AM

## 2022-07-26 NOTE — PROGRESS NOTES
1042 Pt. Brought back to unit, bedside report received from [facility], [address]. Pt. Is A&O, VSS, surgical sites are clean and dry. [id number] Education provided on use of call light, call light in reach. 1045 Pt. Provided with water and crackers, pt. Tolerated well. 1105 Pt. Medicated for pain per MD order, call light in reach. Pt. Denies further needs at this time. 1125 Pt. Provided with warm blanket to place to ABD, pt. Appreciative. Pt. Also educated on how to splint belly when coughing, sneezing and repositioning. 1200 DC instructions reviewed with pt and family, all parties express understanding. 1212 Pt. To DC home in private vehicle with family.

## 2022-07-27 NOTE — OP NOTE
Operative Note    Patient ID:  Kate Gonzales  [id number]  89 y.o.  1940      Indications: This patient presents with a symptomatic gallbladder disease and will undergo laparoscopic cholecystecomy. Pre-operative Diagnosis:  Chronic Calculous Cholecystitis     Post-operative Diagnosis:  Same    Procedure:  Laparoscopic Cholecystectomy    Surgeon: Leeanna Montesinos MD    First Assistant: Chaya Baeza PA-C  The  Use of a first assistant was necessary for the proper positioning, prepping, and draping of the patient, as well as the safe and expeditious execution of the case and closure of skin and subcutaneous tissues. Anesthesia: General endotracheal anesthesia    ASA Class: 3    Findings: Cholecystitis with Cholelithiasis    Estimated Blood Loss:  Minimal           Drains:  none           Total IV Fluids: 500 ml           Specimens: Gallbladder             Complications:  None; patient tolerated the procedure well. Disposition: PACU - hemodynamically stable. Condition: stable        Procedure Details   The patient was seen again in the Holding Room. The risks, benefits, complications, treatment options, and expected outcomes were discussed with the patient. The possibilities of reaction to medication, pulmonary aspiration, perforation of viscus, bleeding, recurrent infection, finding a normal gallbladder, the need for additional procedures, failure to diagnose a condition, the possible need to convert to an open procedure, and creating a complication requiring transfusion or operation were discussed with the patient. The patient and/or family concurred with the proposed plan, giving informed consent. The site of surgery properly noted/marked. The patient was taken to Operating Room, identified as Kate Gonzales and the procedure verified as Laparoscopic Cholecystectomy with possible Intraoperative Cholangiograms. A Time Out was held and the above information confirmed.      Prior to the induction of general anesthesia,  antibiotic prophylaxis was administered. General endotracheal anesthesia was then administered and tolerated well. After the induction, the abdomen was prepped in the usual sterile fashion. The patient was positioned in the supine position with the left armed comfortably tucked, along with some reverse trendelenberg. Local anesthetic agent was injected into the skin of the RUQ and an incision made. Using 5 mm optical trocar the peritoneum was entered without incidence. Pneumoperitoneum was then created with CO2 and tolerated well without any adverse changes in the patient's vital signs. Additional trocars were introduced under direct vision. All skin incisions were infiltrated with a local anesthetic agent before making the incision and placing the trocars. The patient was then positioned in reverse trendelenburg with the right side up. The gallbladder was identified, the fundus grasped and retracted cephalad. The gallbladder was some adhesions. Adhesions were lysed bluntly and with the electrocautery where indicated, taking care not to injure any adjacent organs or viscus. The infundibulum was grasped and retracted laterally, exposing the peritoneum overlying the triangle of Calot. This was then divided and exposed in a blunt fashion. The cystic duct was clearly identified and bluntly dissected circumferentially. The junctions of the gallbladder, cystic duct and common bile duct were clearly identified prior to the division of any linear structure. The cystic duct was then doubly ligated with surgical clips and on the patient side and singly clipped on the gallbladder side and divided. The cystic artery was identified, dissected free, ligated with clips and divided as well. The gallbladder was dissected from the liver bed in retrograde fashion with the electrocautery. The gallbladder was removed through Endobag. The liver bed was irrigated and inspected.  Hemostasis was achieved with the electrocautery. Pneumoperitoneum was completely reduced after viewing removal of the trocars under direct vision. The wound was thoroughly irrigated and the fascia  was then closed with a figure of eight suture; the skin was then closed with running absorbable suture and a sterile dressing was applied. Instrument, sponge, and needle counts were correct at closure and at the conclusion of the case.      Gurpreet Kilgore MD

## 2022-07-31 NOTE — ANESTHESIA POSTPROCEDURE EVALUATION
Department of Anesthesiology  Postprocedure Note    Patient: Betty Arango  MRN: [id number]  YOB: 1940  Date of evaluation: 7/31/2022      Procedure Summary     Date: 07/26/22 Room / Location: [address] [place] [address]    Anesthesia Start: 5740 Anesthesia Stop: 6121    Procedure: CHOLECYSTECTOMY LAPAROSCOPIC (Abdomen) Diagnosis:       CCC (chronic calculous cholecystitis)      (CCC (chronic calculous cholecystitis) [K80.10])    Surgeons: Marysol Yoo MD Responsible Provider: Jesica Liriano MD    Anesthesia Type: General ASA Status: 4          Anesthesia Type: General    Emily Phase I: Emily Score: 10    Emily Phase II: Emily Score: 10      Anesthesia Post Evaluation    Patient location during evaluation: PACU  Patient participation: complete - patient participated  Level of consciousness: sleepy but conscious  Pain score: 2  Airway patency: patent  Nausea & Vomiting: no nausea and no vomiting  Complications: no  Cardiovascular status: hemodynamically stable  Respiratory status: acceptable  Hydration status: euvolemic

## 2022-08-10 ENCOUNTER — OFFICE VISIT (OUTPATIENT)
Dept: SURGERY | Age: 82
End: 2022-08-10
Payer: MEDICARE

## 2022-08-10 VITALS
HEIGHT: 71 IN | HEART RATE: 53 BPM | WEIGHT: 180 LBS | OXYGEN SATURATION: 97 % | DIASTOLIC BLOOD PRESSURE: 62 MMHG | BODY MASS INDEX: 25.2 KG/M2 | SYSTOLIC BLOOD PRESSURE: 122 MMHG

## 2022-08-10 DIAGNOSIS — K40.90 NON-RECURRENT UNILATERAL INGUINAL HERNIA WITHOUT OBSTRUCTION OR GANGRENE: Primary | ICD-10-CM

## 2022-08-10 PROCEDURE — G8427 DOCREV CUR MEDS BY ELIG CLIN: HCPCS | Performed by: SURGERY

## 2022-08-10 PROCEDURE — 99214 OFFICE O/P EST MOD 30 MIN: CPT | Performed by: SURGERY

## 2022-08-10 PROCEDURE — G8417 CALC BMI ABV UP PARAM F/U: HCPCS | Performed by: SURGERY

## 2022-08-10 PROCEDURE — 1036F TOBACCO NON-USER: CPT | Performed by: SURGERY

## 2022-08-10 PROCEDURE — 1123F ACP DISCUSS/DSCN MKR DOCD: CPT | Performed by: SURGERY

## 2022-08-10 ASSESSMENT — PATIENT HEALTH QUESTIONNAIRE - PHQ9
SUM OF ALL RESPONSES TO PHQ QUESTIONS 1-9: 0
SUM OF ALL RESPONSES TO PHQ9 QUESTIONS 1 & 2: 0
1. LITTLE INTEREST OR PLEASURE IN DOING THINGS: 0
2. FEELING DOWN, DEPRESSED OR HOPELESS: 0
SUM OF ALL RESPONSES TO PHQ QUESTIONS 1-9: 0

## 2022-08-15 ASSESSMENT — ENCOUNTER SYMPTOMS
SORE THROAT: 0
STRIDOR: 0
RECTAL PAIN: 0
ABDOMINAL PAIN: 1
ANAL BLEEDING: 0
COLOR CHANGE: 0
PHOTOPHOBIA: 0
CONSTIPATION: 0
EYE ITCHING: 0
APNEA: 0
BACK PAIN: 0
EYE REDNESS: 0
CHOKING: 0

## 2022-08-15 NOTE — PROGRESS NOTES
Chief Complaint   Patient presents with    Post-Op Check     1ST P[facility]- [facility] 07/26/22       SUBJECTIVE:  HPI: Patient presents for evaluation of left inguinal hernia. Symptoms were first noted several years ago. Pain is intermittent and progressive . Lump is reducible. Pt denies nausea vomiting. Pt with no previoushistory of abdominal surgery. Patient is currently status post cholecystectomy and is doing well. This is a new problem that he has discussing to undergo another surgery. I have reviewed the patient's(pertinent information to this visit) medical history, family history(scanned in  theMedia tab under \"patient questioner\"), social history and review of systems with the patient today in the office. Past Surgical History:   Procedure Laterality Date    ABDOMINAL HERNIA REPAIR      CATARACT REMOVAL      CHOLECYSTECTOMY, LAPAROSCOPIC  07/26/2022    CHOLECYSTECTOMY LAPAROSCOPIC performed by Dr. Emilio Hernandez at [id number], [address] N/A 7/26/2022    CHOLECYSTECTOMY LAPAROSCOPIC performed by Josue Reeves MD at [facility] CATH LAB PROCEDURE   10/6/03;10/16/03;10/5/04;3/12    PTCA with stents; 3-12 cont med therapy     Past Medical History:   Diagnosis Date    Arthritis     Back pain, chronic     BPH (benign prostatic hyperplasia)     CAD (coronary artery disease)     stents RCA, Cx, LAD    Cancer ([address] 12/03/2012    skin cancer removed    Family history of coronary artery disease     H/O cardiovascular stress test 6/6/17, 8/18/20    EF 50-60%. Mild anterior and septal wall ischemia. H/O Doppler ultrasound 08/13/2019    No evidence of DVT or SVT. There appears to be calcified thrombus with in the right small saphenous vein. Significant reflux noted of the Right CFV 5.0s. H/O Doppler ultrasound 07/21/2022    Mild (0-49%) disease of bilateral proximal ICA. Normal vetebral flow.     H/O echocardiogram 1/12; 8/13/2019 EF 50-55%. Diastolic Dysfunction Grade I . Sclerotic, but non-stenotic aortic valve. Aortic root dimension upper limits of normal 3.7cm.      Hyperlipidemia     Hypertension     Leg swelling 2019    PAF (paroxysmal atrial fibrillation) ([facility])     Palpitation 2022    S/P angioplasty with stent 10/6/03;10/16/03;10/5/04    stent RCA; stent CX; stent LAD  &     SOB (shortness of breath) 2017    Torsade de pointes 2012        Family History   Problem Relation Age of Onset    Coronary Art Dis Mother     Hypertension Mother     Heart Attack Mother     Coronary Art Dis Brother      Social History     Socioeconomic History    Marital status:      Spouse name: Not on file    Number of children: Not on file    Years of education: Not on file    Highest education level: Not on file   Occupational History    Not on file   Tobacco Use    Smoking status: Former     Years: 25.00     Types: Cigarettes, Pipe     Quit date: 1980     Years since quittin.6    Smokeless tobacco: Never   Vaping Use    Vaping Use: Never used   Substance and Sexual Activity    Alcohol use: No     Comment: occassional    Drug use: No    Sexual activity: Yes     Partners: Female     Comment:    Other Topics Concern    Not on file   Social History Narrative    Not on file     Social Determinants of Health     Financial Resource Strain: Not on file   Food Insecurity: Not on file   Transportation Needs: Not on file   Physical Activity: Not on file   Stress: Not on file   Social Connections: Not on file   Intimate Partner Violence: Not on file   Housing Stability: Not on file       Current Outpatient Medications   Medication Sig Dispense Refill    clopidogrel (PLAVIX) 75 MG tablet Take 1 tablet by mouth daily 30 tablet 5    amiodarone (CORDARONE) 200 MG tablet TAKE 0.5 TABLETS BY MOUTH DAILY 45 tablet 3    amLODIPine-benazepril (LOTREL) 5-20 MG per capsule Take 1 capsule by mouth daily 30 capsule 5 atorvastatin (LIPITOR) 40 MG tablet Take 1 tablet by mouth daily 90 tablet 3    indapamide (LOZOL) 1.25 MG tablet Take 1 tablet by mouth every morning 90 tablet 3    multivitamin (THERAGRAN) per tablet Take 1 tablet by mouth daily. aspirin 81 MG EC tablet Take 81 mg by mouth daily         No current facility-administered medications for this visit. No Known Allergies    Review of Systems:         Review of Systems   Constitutional:  Negative for chills and fever. HENT:  Negative for ear pain, mouth sores, sore throat and tinnitus. Eyes:  Negative for photophobia, redness and itching. Respiratory:  Negative for apnea, choking and stridor. Cardiovascular:  Negative for chest pain and palpitations. Gastrointestinal:  Positive for abdominal pain. Negative for anal bleeding, constipation and rectal pain. Endocrine: Negative for polydipsia. Genitourinary:  Negative for enuresis, flank pain and hematuria. Musculoskeletal:  Negative for back pain, joint swelling and myalgias. Skin:  Negative for color change and pallor. Allergic/Immunologic: Negative for environmental allergies. Neurological:  Negative for syncope and speech difficulty. Psychiatric/Behavioral:  Negative for confusion and hallucinations. OBJECTIVE:  Physical Exam:    /62 (Site: Right Upper Arm, Position: Sitting, Cuff Size: Small Adult)   Pulse 53   Ht 5' 11\" (1.803 m)   Wt 180 lb (81.6 kg)   SpO2 97%   BMI 25.10 kg/m²      Physical Exam  Constitutional:       Appearance: He is well-developed. HENT:      Head: Normocephalic. Eyes:      Pupils: Pupils are equal, round, and reactive to light. Cardiovascular:      Rate and Rhythm: Normal rate. Pulmonary:      Effort: Pulmonary effort is normal.   Abdominal:      General: There is no distension. Palpations: Abdomen is soft. There is no mass. Tenderness: There is no abdominal tenderness. There is no guarding or rebound.       Hernia: A hernia [facility]) is present. Musculoskeletal:         General: Normal range of motion. Cervical back: Normal range of motion and neck supple. Skin:     General: Skin is warm. Neurological:      Mental Status: He is alert and oriented to person, place, and time. ASSESSMENT:  1. Non-recurrent unilateral inguinal hernia without obstruction or gangrene          PLAN:  Treatment: Patient will return in 2 months to schedule surgery for robotic assisted left inguinal hernia repair. .  Patient counseled on risks, benefits, and alternatives of treatment plan at length. Patient states anunderstanding and willingness to proceed with plan. No orders of the defined types were placed in this encounter. No orders of the defined types were placed in this encounter. Follow Up:  No follow-ups on file.       Alexandra Gomez MD

## 2022-09-05 NOTE — ASSESSMENT & PLAN NOTE
Cardiology notified of the patient having frequent pauses, the greatest of which being 2.4 seconds. No additional orders received.    Well controlled on amiodarone 100 mg daily, continue the same.

## 2022-09-21 RX ORDER — CLOPIDOGREL BISULFATE 75 MG/1
75 TABLET ORAL DAILY
Qty: 90 TABLET | Refills: 5 | Status: SHIPPED | OUTPATIENT
Start: 2022-09-21

## 2022-09-26 ENCOUNTER — OFFICE VISIT (OUTPATIENT)
Dept: SURGERY | Age: 82
End: 2022-09-26
Payer: MEDICARE

## 2022-09-26 VITALS
DIASTOLIC BLOOD PRESSURE: 70 MMHG | HEIGHT: 71 IN | WEIGHT: 173.3 LBS | HEART RATE: 76 BPM | BODY MASS INDEX: 24.26 KG/M2 | SYSTOLIC BLOOD PRESSURE: 118 MMHG

## 2022-09-26 DIAGNOSIS — K40.90 NON-RECURRENT UNILATERAL INGUINAL HERNIA WITHOUT OBSTRUCTION OR GANGRENE: Primary | ICD-10-CM

## 2022-09-26 PROCEDURE — G8420 CALC BMI NORM PARAMETERS: HCPCS | Performed by: SURGERY

## 2022-09-26 PROCEDURE — G8427 DOCREV CUR MEDS BY ELIG CLIN: HCPCS | Performed by: SURGERY

## 2022-09-26 PROCEDURE — 99214 OFFICE O/P EST MOD 30 MIN: CPT | Performed by: SURGERY

## 2022-09-26 PROCEDURE — 1036F TOBACCO NON-USER: CPT | Performed by: SURGERY

## 2022-09-26 PROCEDURE — 1123F ACP DISCUSS/DSCN MKR DOCD: CPT | Performed by: SURGERY

## 2022-09-26 ASSESSMENT — PATIENT HEALTH QUESTIONNAIRE - PHQ9
SUM OF ALL RESPONSES TO PHQ9 QUESTIONS 1 & 2: 0
SUM OF ALL RESPONSES TO PHQ QUESTIONS 1-9: 0
1. LITTLE INTEREST OR PLEASURE IN DOING THINGS: 0
SUM OF ALL RESPONSES TO PHQ QUESTIONS 1-9: 0
SUM OF ALL RESPONSES TO PHQ QUESTIONS 1-9: 0
2. FEELING DOWN, DEPRESSED OR HOPELESS: 0
SUM OF ALL RESPONSES TO PHQ QUESTIONS 1-9: 0

## 2022-10-03 ASSESSMENT — ENCOUNTER SYMPTOMS
BACK PAIN: 0
COLOR CHANGE: 0
SORE THROAT: 0
EYE ITCHING: 0
CHOKING: 0
PHOTOPHOBIA: 0
APNEA: 0
EYE REDNESS: 0
CONSTIPATION: 0
ANAL BLEEDING: 0
ABDOMINAL PAIN: 1
RECTAL PAIN: 0
STRIDOR: 0

## 2022-10-04 ENCOUNTER — TELEPHONE (OUTPATIENT)
Dept: SURGERY | Age: 82
End: 2022-10-04

## 2022-10-04 NOTE — PROGRESS NOTES
Chief Complaint   Patient presents with    Abdominal Pain         SUBJECTIVE:  HPI: Patient presents forevaluation of left inguinal hernia. Symptoms were first noted several month ago. Pain is progressive and worsening . Lump is reducible. Pt denies nausea vomiting. Pt with recent cholecystectomy with done very well. I have reviewed the patient's(pertinent information to this visit) medical history, family history(scanned in  theMedia tab under \"patient questioner\"), social history and review of systems with the patient today in the office. Past Surgical History:   Procedure Laterality Date    ABDOMINAL HERNIA REPAIR      CATARACT REMOVAL      CHOLECYSTECTOMY, LAPAROSCOPIC  07/26/2022    CHOLECYSTECTOMY LAPAROSCOPIC performed by Dr. Carlos Moss at [id number], [address] N/A 7/26/2022    CHOLECYSTECTOMY LAPAROSCOPIC performed by Leonila Patel MD at [facility] [facility] CATH LAB PROCEDURE   10/6/03;10/16/03;10/5/04;3/12    PTCA with stents; 3-12 cont med therapy     Past Medical History:   Diagnosis Date    Arthritis     Back pain, chronic     BPH (benign prostatic hyperplasia)     CAD (coronary artery disease)     stents RCA, Cx, LAD    Cancer ([address] 12/03/2012    skin cancer removed    Family history of coronary artery disease     H/O cardiovascular stress test 6/6/17, 8/18/20    EF 50-60%. Mild anterior and septal wall ischemia. H/O Doppler ultrasound 08/13/2019    No evidence of DVT or SVT. There appears to be calcified thrombus with in the right small saphenous vein. Significant reflux noted of the Right CFV 5.0s. H/O Doppler ultrasound 07/21/2022    Mild (0-49%) disease of bilateral proximal ICA. Normal vetebral flow. H/O echocardiogram 1/12; 8/13/2019    EF 50-55%. Diastolic Dysfunction Grade I . Sclerotic, but non-stenotic aortic valve. Aortic root dimension upper limits of normal 3.7cm.      Hyperlipidemia     Hypertension Leg swelling 2019    PAF (paroxysmal atrial fibrillation) [address]     Palpitation 2022    S/P angioplasty with stent 10/6/03;10/16/03;10/5/04    stent RCA; stent CX; stent LAD  &     SOB (shortness of breath) 2017    Torsade de pointes 2012        Family History   Problem Relation Age of Onset    Coronary Art Dis Mother     Hypertension Mother     Heart Attack Mother     Coronary Art Dis Brother      Social History     Socioeconomic History    Marital status:      Spouse name: Not on file    Number of children: Not on file    Years of education: Not on file    Highest education level: Not on file   Occupational History    Not on file   Tobacco Use    Smoking status: Former     Years: 25.00     Types: Cigarettes, Pipe     Quit date: 1980     Years since quittin.7    Smokeless tobacco: Never   Vaping Use    Vaping Use: Never used   Substance and Sexual Activity    Alcohol use: No     Comment: occassional    Drug use: No    Sexual activity: Yes     Partners: Female     Comment:    Other Topics Concern    Not on file   Social History Narrative    Not on file     Social Determinants of Health     Financial Resource Strain: Not on file   Food Insecurity: Not on file   Transportation Needs: Not on file   Physical Activity: Not on file   Stress: Not on file   Social Connections: Not on file   Intimate Partner Violence: Not on file   Housing Stability: Not on file       Current Outpatient Medications   Medication Sig Dispense Refill    clopidogrel (PLAVIX) 75 MG tablet TAKE 1 TABLET BY MOUTH DAILY 90 tablet 5    amiodarone (CORDARONE) 200 MG tablet TAKE 0.5 TABLETS BY MOUTH DAILY 45 tablet 3    amLODIPine-benazepril (LOTREL) 5-20 MG per capsule Take 1 capsule by mouth daily 30 capsule 5    atorvastatin (LIPITOR) 40 MG tablet Take 1 tablet by mouth daily 90 tablet 3    indapamide (LOZOL) 1.25 MG tablet Take 1 tablet by mouth every morning 90 tablet 3    multivitamin General: Skin is warm. Neurological:      Mental Status: He is alert and oriented to person, place, and time. ASSESSMENT:  1. Non-recurrent unilateral inguinal hernia without obstruction or gangrene          PLAN:  Treatment: We will proceed with robotic assisted left inguinal hernia repair. .  Patient counseled on risks, benefits, and alternatives of treatment plan at length. Patient states anunderstanding and willingness to proceed with plan. No orders of the defined types were placed in this encounter. No orders of the defined types were placed in this encounter. Follow Up:  No follow-ups on file.       Rao Alejandro MD

## 2022-10-04 NOTE — TELEPHONE ENCOUNTER
SPOKE TO  [id number] ([address] [facility]) SCHEDULED @ [facility].  NOTIFIED OF DATES, TIMES AND LOCATION    PHONE ASSESSMENT   SURGERY - 10/14/22  @ 1100  P/O - 10/26/22 @ [address]    NPO AFTER MIDNIGHT  HOLD BLOOD THINNERS - HOLD 5 DAYS PRIOR

## 2022-10-05 NOTE — PROGRESS NOTES
Attempted to contact patient for PAT assessment and surgery instructions, spouse states he is not home at this time to return call later.

## 2022-10-06 NOTE — PROGRESS NOTES
Patient instructed to contact physician office for stop date of any blood thinners, patient verbalized understanding.

## 2022-10-06 NOTE — PROGRESS NOTES
.Surgery @ [facility] on 10/14/22 you will be called 10/13/22 with times               1. Do not eat or drink anything after midnight - unless instructed by your doctor prior to surgery. This includes                   no water, chewing gum or mints. 2. Follow your directions as prescribed by the doctor for your procedure and medications. Take Amiodorone, Amlodipine with sips of water. 3. Check with your Doctor regarding stopping vitamins, supplements, blood thinners Plavix and ASA                  and follow their instructions. Stop vitamins, supplements and NSAIDS: 10/07/2022   4. Do not smoke, vape or use chewing tobacco morning of surgery. Do not drink any alcoholic beverages 24 hours prior to surgery. This includes NA Beer. No street drugs 7 days prior to surgery. 5. You may brush your teeth and gargle the morning of surgery. DO NOT SWALLOW WATER   6. You MUST make arrangements for a responsible adult to take you home after your surgery and be able to check on you every couple                   hours for the day. You will not be allowed to leave alone or drive yourself home. It is strongly suggested someone stay with you the first 24                   hrs. Your surgery will be cancelled if you do not have a ride home. 7. Please wear simple, loose fitting clothing to the hospital.  Maia Lion not bring valuables (money, credit cards, checkbooks, etc.) Do not wear any                   makeup (including no eye makeup) or nail polish on your fingers or toes. 8. DO NOT wear any jewelry or piercings on day of surgery. All body piercing jewelry must be removed. 9. If you have dentures, they will be removed before going to the OR; we will provide you a container. If you wear contact lenses or glasses,                  they will be removed; please bring a case for them.            10. If you  have a Living Will and Durable Power of  for Healthcare, please bring in a copy.           11. Please bring picture ID,  insurance card, paperwork from the doctors office    (H & P, Consent, & card for implantable devices). 12. Take a shower the morning of your procedure with Hibiclens or an anti-bacterial soap. Do not apply any make-up, deodorant, lotion, oil or powder. 13.  Enter thru the main entrance wearing a mask on the day of surgery.

## 2022-10-13 ENCOUNTER — ANESTHESIA EVENT (OUTPATIENT)
Dept: OPERATING ROOM | Age: 82
End: 2022-10-13
Payer: MEDICARE

## 2022-10-13 NOTE — ANESTHESIA PRE PROCEDURE
Department of Anesthesiology  Preprocedure Note       Name:  Zaki Ortiz   Age:  78 y.o.  :  1940                                          MRN:  [id number]         Date:  10/13/2022      Surgeon: Graeme Figueroa):  Meche May MD    Procedure: Procedure(s):  LEFT HERNIA INGUINAL REPAIR LAPAROSCOPIC ROBOTIC    Medications prior to admission:   Prior to Admission medications    Medication Sig Start Date End Date Taking?  Authorizing Provider   clopidogrel (PLAVIX) 75 MG tablet TAKE 1 TABLET BY MOUTH DAILY 22   Jorje Ross MD   amiodarone (CORDARONE) 200 MG tablet TAKE 0.5 TABLETS BY MOUTH DAILY 22   Jorje Ross MD   amLODIPine-benazepril (LOTREL) 5-20 MG per capsule Take 1 capsule by mouth daily 22   Jorje Ross MD   atorvastatin (LIPITOR) 40 MG tablet Take 1 tablet by mouth daily 22   Jorje Ross MD   indapamide (LOZOL) 1.25 MG tablet Take 1 tablet by mouth every morning 22   Jorje Ross MD   multivitamin [facility]) per tablet Take 1 tablet by mouth daily Patient states he is no taking Preservision    Historical Provider, MD   aspirin 81 MG EC tablet Take 81 mg by mouth daily    Patient not taking: No sig reported    Historical Provider, MD       Current medications:    Current Outpatient Medications   Medication Sig Dispense Refill    clopidogrel (PLAVIX) 75 MG tablet TAKE 1 TABLET BY MOUTH DAILY 90 tablet 5    amiodarone (CORDARONE) 200 MG tablet TAKE 0.5 TABLETS BY MOUTH DAILY 45 tablet 3    amLODIPine-benazepril (LOTREL) 5-20 MG per capsule Take 1 capsule by mouth daily 30 capsule 5    atorvastatin (LIPITOR) 40 MG tablet Take 1 tablet by mouth daily 90 tablet 3    indapamide (LOZOL) 1.25 MG tablet Take 1 tablet by mouth every morning 90 tablet 3    multivitamin (THERAGRAN) per tablet Take 1 tablet by mouth daily Patient states he is no taking Preservision      aspirin 81 MG EC tablet Take 81 mg by mouth daily   (Patient not taking: No sig reported) No current facility-administered medications for this visit. 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 Mild anterior and septal wall ischemia.  H/O Doppler ultrasound 08/13/2019    No evidence of DVT or SVT. There appears to be calcified thrombus with in the right small saphenous vein. Significant reflux noted of the Right CFV 5.0s.    H/O Doppler ultrasound 07/21/2022    Mild (0-49%) disease of bilateral proximal ICA. Normal vetebral flow.  H/O echocardiogram 1/12; 8/13/2019    EF 50-55%. Diastolic Dysfunction Grade I . Sclerotic, but non-stenotic aortic valve. Aortic root dimension upper limits of normal 3.7cm.     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       Past Surgical History:        Procedure Laterality Date    ABDOMINAL HERNIA REPAIR      CATARACT REMOVAL      CHOLECYSTECTOMY, LAPAROSCOPIC  07/26/2022    CHOLECYSTECTOMY LAPAROSCOPIC performed by  [facility] LAPAROSCOPIC N/A 2022    CHOLECYSTECTOMY LAPAROSCOPIC performed by Freddy Simms MD at [address] CATH LAB PROCEDURE   10/6/03;10/16/03;10/5/04;3/12    PTCA with stents; 3- cont med therapy       Social History:    Social History     Tobacco Use    Smoking status: Former     Years: 25.00     Types: Cigarettes, Pipe     Quit date: 1980     Years since quittin.8    Smokeless tobacco: Never   Substance Use Topics    Alcohol use: No     Comment: occassional                                Counseling given: Not Answered      Vital Signs (Current): There were no vitals filed for this visit.                                            BP Readings from Last 3 Encounters:   22 118/70   08/10/22 122/62   22 138/72       NPO Status:                                                                                 BMI:   Wt Readings from Last 3 Encounters:   22 173 lb 4.8 oz (78.6 kg)   08/10/22 180 lb (81.6 kg)   22 180 lb (81.6 kg)     There is no height or weight on file to calculate BMI.    CBC:   Lab Results   Component Value Date/Time    WBC 14.0 2022 02:30 AM    RBC 4.50 2022 02:30 AM    HGB 12.9 2022 02:30 AM    HCT 39.2 2022 02:30 AM    MCV 87.1 2022 02:30 AM    RDW 14.0 2022 02:30 AM     2022 02:30 AM       CMP:   Lab Results   Component Value Date/Time     2022 09:30 AM    K 4.0 2022 09:30 AM     2022 09:30 AM    CO2 27 2022 09:30 AM    BUN 37 2022 09:30 AM    CREATININE 1.4 2022 09:30 AM    GFRAA 59 2022 09:30 AM    LABGLOM 48 2022 09:30 AM    GLUCOSE 101 2022 02:30 AM    PROT 6.3 2022 09:30 AM    PROT 6.5 2012 02:30 PM    CALCIUM 10.0 2022 09:30 AM    BILITOT 0.8 2022 09:30 AM    ALKPHOS 95 2022 09:30 AM    AST 17 2022 09:30 AM    ALT 17 07/19/2022 09:30 AM       POC Tests: No results for input(s): POCGLU, POCNA, POCK, POCCL, POCBUN, POCHEMO, POCHCT in the last 72 hours.     Coags:   Lab Results   Component Value Date/Time    PROTIME 9.3 06/05/2013 05:15 PM    PROTIME 9.8 03/26/2012 08:45 AM    INR 0.96 06/05/2013 05:15 PM    APTT 61.5 06/05/2013 05:15 PM       HCG (If Applicable): No results found for: PREGTESTUR, PREGSERUM, HCG, HCGQUANT     ABGs: No results found for: PHART, PO2ART, HAG0XAL, XWU4CUD, BEART, T9DZONSF     Type & Screen (If Applicable):  No results found for: LABABO, LABRH    Drug/Infectious Status (If Applicable):  No results found for: HIV, HEPCAB    COVID-19 Screening (If Applicable): No results found for: COVID19        Anesthesia Evaluation  Patient summary reviewed no history of anesthetic complications:   Airway: Mallampati: II  TM distance: >3 FB   Neck ROM: full  Mouth opening: > = 3 FB   Dental:          Pulmonary:   (+) decreased breath sounds     (-) shortness of breath                           Cardiovascular:  Exercise tolerance: good (>4 METS),   (+) hypertension:, CAD:, CABG/stent:, dysrhythmias: atrial fibrillation, hyperlipidemia      ECG reviewed  Rhythm: irregular      Stress test reviewed  Cleared by cardiology           ROS comment:  Normal sinus rhythm with sinus arrhythmia   Left axis deviation   Inferior infarct (cited on or before 13-APR-2022)   Abnormal ECG   When compared with ECG of 13-APR-2022 10:17,   premature ventricular complexes are no longer present   premature atrial complexes are no longer present   Borderline criteria for Anterior infarct are no longer present   Nonspecific T wave abnormality no longer evident in Lateral leads   Confirmed by DAVID Oconnell ([id number]) on 7/7/2022 3:53:36 PM  Sinus rhythm with occasional premature ventricular complexes and premature atrial complexes   Inferior infarct , age undetermined   Possible Anterior infarct , age undetermined   Abnormal ECG   No previous ECGs available   Confirmed by [facility] MD, [facility] ([id number]) on 4/13/2022 5:09:03 PM   [facility] physician Dr. Jairon Rice .  [facility] anterior and septal wall ischemia. Normal Left ventricular size, wall   motion and systolic function. Ejection fraction is 56 %.     Recommendation   Continue aggressive lifestyle and risk modification and medical therapy.      Signatures      ------------------------------------------------------------------   Electronically signed by Chris Gore MD   (Interpreting cardiologist) on 08/18/2020 at 17:32   ------------------------------------------------------------------            Neuro/Psych:   (+) CVA:,              ROS comment: Occular  GI/Hepatic/Renal:             Endo/Other:    (+) blood dyscrasia: anticoagulation therapy, arthritis: OA., malignancy/cancer. Abdominal:             Vascular:   + DVT, . Other Findings:             Anesthesia Plan      general     ASA 3       Induction: intravenous. MIPS: Postoperative opioids intended and Prophylactic antiemetics administered. Anesthetic plan and risks discussed with patient. Plan discussed with CRNA. Pre Anesthesia Evaluation complete. Anesthesia plan, risks, benefits, alternatives, and personal involved discussed with patient. Patients and/or legal guardian verbalized an understanding  and agreed to proceed.   Daniel Hearn,   10/14/2022

## 2022-10-13 NOTE — H&P
General Surgery - H&P  Dr. Vince Main PA-C      The patient was seen and examined. There have been no changes to the H&P since the patient was seen in the office on 9/26/22. We will proceed with robotic assisted left inguinal hernia repair. The patient was counseled at length about the risks of dania Covid-19 during their perioperative period and any recovery window from their procedure. The patient was made aware that dania Covid-19  may worsen their prognosis for recovering from their procedure  and lend to a higher morbidity and/or mortality risk. All material risks, benefits, and reasonable alternatives including postponing the procedure were discussed. The patient does wish to proceed with the procedure at this time.       Manuel Giles PA-C

## 2022-10-14 ENCOUNTER — ANESTHESIA (OUTPATIENT)
Dept: OPERATING ROOM | Age: 82
End: 2022-10-14
Payer: MEDICARE

## 2022-10-14 ENCOUNTER — HOSPITAL ENCOUNTER (OUTPATIENT)
Age: 82
Setting detail: OUTPATIENT SURGERY
Discharge: HOME | End: 2022-10-14
Attending: SURGERY | Admitting: SURGERY
Payer: MEDICARE

## 2022-10-14 VITALS
TEMPERATURE: 97.1 F | HEART RATE: 67 BPM | OXYGEN SATURATION: 98 % | BODY MASS INDEX: 24.5 KG/M2 | RESPIRATION RATE: 18 BRPM | HEIGHT: 71 IN | WEIGHT: 175 LBS | DIASTOLIC BLOOD PRESSURE: 64 MMHG | SYSTOLIC BLOOD PRESSURE: 127 MMHG

## 2022-10-14 DIAGNOSIS — K43.9 VENTRAL HERNIA WITHOUT OBSTRUCTION OR GANGRENE: ICD-10-CM

## 2022-10-14 DIAGNOSIS — K40.90 NON-RECURRENT UNILATERAL INGUINAL HERNIA WITHOUT OBSTRUCTION OR GANGRENE: Primary | ICD-10-CM

## 2022-10-14 DIAGNOSIS — K80.10 CCC (CHRONIC CALCULOUS CHOLECYSTITIS): ICD-10-CM

## 2022-10-14 LAB
ANION GAP SERPL CALCULATED.3IONS-SCNC: 9 MMOL/L (ref 4–16)
BASOPHILS ABSOLUTE: 0.1 K/CU MM
BASOPHILS RELATIVE PERCENT: 1.1 % (ref 0–1)
BUN BLDV-MCNC: 29 MG/DL (ref 6–23)
CALCIUM SERPL-MCNC: 9.2 MG/DL (ref 8.3–10.6)
CHLORIDE BLD-SCNC: 102 MMOL/L (ref 99–110)
CO2: 26 MMOL/L (ref 21–32)
CREAT SERPL-MCNC: 1.1 MG/DL (ref 0.9–1.3)
DIFFERENTIAL TYPE: ABNORMAL
EOSINOPHILS ABSOLUTE: 0.3 K/CU MM
EOSINOPHILS RELATIVE PERCENT: 3.2 % (ref 0–3)
GFR AFRICAN AMERICAN: >60 ML/MIN/1.73M2
GFR NON-AFRICAN AMERICAN: >60 ML/MIN/1.73M2
GLUCOSE BLD-MCNC: 103 MG/DL (ref 70–99)
HCT VFR BLD CALC: 39.7 % (ref 42–52)
HEMOGLOBIN: 13.1 GM/DL (ref 13.5–18)
IMMATURE NEUTROPHIL %: 0.7 % (ref 0–0.43)
LYMPHOCYTES ABSOLUTE: 1.3 K/CU MM
LYMPHOCYTES RELATIVE PERCENT: 13 % (ref 24–44)
MCH RBC QN AUTO: 29.6 PG (ref 27–31)
MCHC RBC AUTO-ENTMCNC: 33 % (ref 32–36)
MCV RBC AUTO: 89.8 FL (ref 78–100)
MONOCYTES ABSOLUTE: 0.9 K/CU MM
MONOCYTES RELATIVE PERCENT: 9.1 % (ref 0–4)
NUCLEATED RBC %: 0 %
PDW BLD-RTO: 14.9 % (ref 11.7–14.9)
PLATELET # BLD: 315 K/CU MM (ref 140–440)
PMV BLD AUTO: 10.8 FL (ref 7.5–11.1)
POTASSIUM SERPL-SCNC: 4.1 MMOL/L (ref 3.5–5.1)
RBC # BLD: 4.42 M/CU MM (ref 4.6–6.2)
SEGMENTED NEUTROPHILS ABSOLUTE COUNT: 7.4 K/CU MM
SEGMENTED NEUTROPHILS RELATIVE PERCENT: 72.9 % (ref 36–66)
SODIUM BLD-SCNC: 137 MMOL/L (ref 135–145)
TOTAL IMMATURE NEUTOROPHIL: 0.07 K/CU MM
TOTAL NUCLEATED RBC: 0 K/CU MM
WBC # BLD: 10.1 K/CU MM (ref 4–10.5)

## 2022-10-14 PROCEDURE — S2900 ROBOTIC SURGICAL SYSTEM: HCPCS | Performed by: SURGERY

## 2022-10-14 PROCEDURE — 2580000003 HC RX 258: Performed by: ANESTHESIOLOGY

## 2022-10-14 PROCEDURE — APPNB180 APP NON BILLABLE TIME > 60 MINS: Performed by: PHYSICIAN ASSISTANT

## 2022-10-14 PROCEDURE — 7100000000 HC PACU RECOVERY - FIRST 15 MIN: Performed by: SURGERY

## 2022-10-14 PROCEDURE — 2500000003 HC RX 250 WO HCPCS: Performed by: NURSE ANESTHETIST, CERTIFIED REGISTERED

## 2022-10-14 PROCEDURE — 49650 LAP ING HERNIA REPAIR INIT: CPT | Performed by: PHYSICIAN ASSISTANT

## 2022-10-14 PROCEDURE — 6370000000 HC RX 637 (ALT 250 FOR IP): Performed by: ANESTHESIOLOGY

## 2022-10-14 PROCEDURE — 3600000019 HC SURGERY ROBOT ADDTL 15MIN: Performed by: SURGERY

## 2022-10-14 PROCEDURE — 49650 LAP ING HERNIA REPAIR INIT: CPT | Performed by: SURGERY

## 2022-10-14 PROCEDURE — C1781 MESH (IMPLANTABLE): HCPCS | Performed by: SURGERY

## 2022-10-14 PROCEDURE — 49654 PR LAP, INCISIONAL HERNIA REPAIR,REDUCIBLE: CPT | Performed by: PHYSICIAN ASSISTANT

## 2022-10-14 PROCEDURE — 2500000003 HC RX 250 WO HCPCS: Performed by: SURGERY

## 2022-10-14 PROCEDURE — 3700000001 HC ADD 15 MINUTES (ANESTHESIA): Performed by: SURGERY

## 2022-10-14 PROCEDURE — 85025 COMPLETE CBC W/AUTO DIFF WBC: CPT

## 2022-10-14 PROCEDURE — 7100000001 HC PACU RECOVERY - ADDTL 15 MIN: Performed by: SURGERY

## 2022-10-14 PROCEDURE — 6360000002 HC RX W HCPCS: Performed by: ANESTHESIOLOGY

## 2022-10-14 PROCEDURE — 7100000011 HC PHASE II RECOVERY - ADDTL 15 MIN: Performed by: SURGERY

## 2022-10-14 PROCEDURE — 7100000010 HC PHASE II RECOVERY - FIRST 15 MIN: Performed by: SURGERY

## 2022-10-14 PROCEDURE — 6360000002 HC RX W HCPCS: Performed by: PHYSICIAN ASSISTANT

## 2022-10-14 PROCEDURE — 80048 BASIC METABOLIC PNL TOTAL CA: CPT

## 2022-10-14 PROCEDURE — 3700000000 HC ANESTHESIA ATTENDED CARE: Performed by: SURGERY

## 2022-10-14 PROCEDURE — 3600000009 HC SURGERY ROBOT BASE: Performed by: SURGERY

## 2022-10-14 PROCEDURE — 2580000003 HC RX 258: Performed by: PHYSICIAN ASSISTANT

## 2022-10-14 PROCEDURE — 6360000002 HC RX W HCPCS: Performed by: NURSE ANESTHETIST, CERTIFIED REGISTERED

## 2022-10-14 PROCEDURE — 2709999900 HC NON-CHARGEABLE SUPPLY: Performed by: SURGERY

## 2022-10-14 PROCEDURE — 49654 PR LAP, INCISIONAL HERNIA REPAIR,REDUCIBLE: CPT | Performed by: SURGERY

## 2022-10-14 DEVICE — MESH HERN W15XH15CM POLYPR NONABSORBABLE SYN SQ PROL: Type: IMPLANTABLE DEVICE | Site: ABDOMEN | Status: FUNCTIONAL

## 2022-10-14 RX ORDER — HALOPERIDOL 5 MG/ML
1 INJECTION INTRAMUSCULAR
Status: DISCONTINUED | OUTPATIENT
Start: 2022-10-14 | End: 2022-10-14 | Stop reason: HOSPADM

## 2022-10-14 RX ORDER — MIDAZOLAM HYDROCHLORIDE 2 MG/2ML
2 INJECTION, SOLUTION INTRAMUSCULAR; INTRAVENOUS
Status: DISCONTINUED | OUTPATIENT
Start: 2022-10-14 | End: 2022-10-14 | Stop reason: HOSPADM

## 2022-10-14 RX ORDER — DIPHENHYDRAMINE HYDROCHLORIDE 50 MG/ML
12.5 INJECTION INTRAMUSCULAR; INTRAVENOUS
Status: DISCONTINUED | OUTPATIENT
Start: 2022-10-14 | End: 2022-10-14 | Stop reason: HOSPADM

## 2022-10-14 RX ORDER — FENTANYL CITRATE 50 UG/ML
50 INJECTION, SOLUTION INTRAMUSCULAR; INTRAVENOUS EVERY 5 MIN PRN
Status: DISCONTINUED | OUTPATIENT
Start: 2022-10-14 | End: 2022-10-14 | Stop reason: HOSPADM

## 2022-10-14 RX ORDER — ONDANSETRON 2 MG/ML
INJECTION INTRAMUSCULAR; INTRAVENOUS PRN
Status: DISCONTINUED | OUTPATIENT
Start: 2022-10-14 | End: 2022-10-14 | Stop reason: SDUPTHER

## 2022-10-14 RX ORDER — OXYCODONE HYDROCHLORIDE 5 MG/1
5 TABLET ORAL
Status: COMPLETED | OUTPATIENT
Start: 2022-10-14 | End: 2022-10-14

## 2022-10-14 RX ORDER — FENTANYL CITRATE 50 UG/ML
INJECTION, SOLUTION INTRAMUSCULAR; INTRAVENOUS PRN
Status: DISCONTINUED | OUTPATIENT
Start: 2022-10-14 | End: 2022-10-14 | Stop reason: SDUPTHER

## 2022-10-14 RX ORDER — HYDRALAZINE HYDROCHLORIDE 20 MG/ML
10 INJECTION INTRAMUSCULAR; INTRAVENOUS
Status: DISCONTINUED | OUTPATIENT
Start: 2022-10-14 | End: 2022-10-14 | Stop reason: HOSPADM

## 2022-10-14 RX ORDER — BUPIVACAINE HYDROCHLORIDE 5 MG/ML
INJECTION, SOLUTION EPIDURAL; INTRACAUDAL
Status: COMPLETED | OUTPATIENT
Start: 2022-10-14 | End: 2022-10-14

## 2022-10-14 RX ORDER — GLYCOPYRROLATE 0.2 MG/ML
INJECTION INTRAMUSCULAR; INTRAVENOUS PRN
Status: DISCONTINUED | OUTPATIENT
Start: 2022-10-14 | End: 2022-10-14 | Stop reason: SDUPTHER

## 2022-10-14 RX ORDER — ROCURONIUM BROMIDE 10 MG/ML
INJECTION, SOLUTION INTRAVENOUS PRN
Status: DISCONTINUED | OUTPATIENT
Start: 2022-10-14 | End: 2022-10-14 | Stop reason: SDUPTHER

## 2022-10-14 RX ORDER — LABETALOL HYDROCHLORIDE 5 MG/ML
10 INJECTION, SOLUTION INTRAVENOUS
Status: DISCONTINUED | OUTPATIENT
Start: 2022-10-14 | End: 2022-10-14 | Stop reason: HOSPADM

## 2022-10-14 RX ORDER — LIDOCAINE HYDROCHLORIDE 20 MG/ML
INJECTION, SOLUTION INTRAVENOUS PRN
Status: DISCONTINUED | OUTPATIENT
Start: 2022-10-14 | End: 2022-10-14 | Stop reason: SDUPTHER

## 2022-10-14 RX ORDER — IPRATROPIUM BROMIDE AND ALBUTEROL SULFATE 2.5; .5 MG/3ML; MG/3ML
1 SOLUTION RESPIRATORY (INHALATION)
Status: DISCONTINUED | OUTPATIENT
Start: 2022-10-14 | End: 2022-10-14 | Stop reason: HOSPADM

## 2022-10-14 RX ORDER — PROCHLORPERAZINE EDISYLATE 5 MG/ML
5 INJECTION INTRAMUSCULAR; INTRAVENOUS
Status: DISCONTINUED | OUTPATIENT
Start: 2022-10-14 | End: 2022-10-14 | Stop reason: HOSPADM

## 2022-10-14 RX ORDER — SODIUM CHLORIDE 0.9 % (FLUSH) 0.9 %
5-40 SYRINGE (ML) INJECTION PRN
Status: DISCONTINUED | OUTPATIENT
Start: 2022-10-14 | End: 2022-10-14 | Stop reason: HOSPADM

## 2022-10-14 RX ORDER — SODIUM CHLORIDE 9 MG/ML
INJECTION, SOLUTION INTRAVENOUS PRN
Status: DISCONTINUED | OUTPATIENT
Start: 2022-10-14 | End: 2022-10-14 | Stop reason: HOSPADM

## 2022-10-14 RX ORDER — SODIUM CHLORIDE 0.9 % (FLUSH) 0.9 %
5-40 SYRINGE (ML) INJECTION EVERY 12 HOURS SCHEDULED
Status: DISCONTINUED | OUTPATIENT
Start: 2022-10-14 | End: 2022-10-14 | Stop reason: HOSPADM

## 2022-10-14 RX ORDER — DEXAMETHASONE SODIUM PHOSPHATE 4 MG/ML
INJECTION, SOLUTION INTRA-ARTICULAR; INTRALESIONAL; INTRAMUSCULAR; INTRAVENOUS; SOFT TISSUE PRN
Status: DISCONTINUED | OUTPATIENT
Start: 2022-10-14 | End: 2022-10-14 | Stop reason: SDUPTHER

## 2022-10-14 RX ORDER — HYDROCODONE BITARTRATE AND ACETAMINOPHEN 5; 325 MG/1; MG/1
1 TABLET ORAL EVERY 6 HOURS PRN
Qty: 12 TABLET | Refills: 0 | Status: SHIPPED | OUTPATIENT
Start: 2022-10-14 | End: 2022-10-17

## 2022-10-14 RX ORDER — PROPOFOL 10 MG/ML
INJECTION, EMULSION INTRAVENOUS PRN
Status: DISCONTINUED | OUTPATIENT
Start: 2022-10-14 | End: 2022-10-14 | Stop reason: SDUPTHER

## 2022-10-14 RX ORDER — SODIUM CHLORIDE, SODIUM LACTATE, POTASSIUM CHLORIDE, CALCIUM CHLORIDE 600; 310; 30; 20 MG/100ML; MG/100ML; MG/100ML; MG/100ML
INJECTION, SOLUTION INTRAVENOUS CONTINUOUS
Status: DISCONTINUED | OUTPATIENT
Start: 2022-10-14 | End: 2022-10-14 | Stop reason: HOSPADM

## 2022-10-14 RX ADMIN — PROPOFOL 50 MG: 10 INJECTION, EMULSION INTRAVENOUS at 11:01

## 2022-10-14 RX ADMIN — PROPOFOL 40 MG: 10 INJECTION, EMULSION INTRAVENOUS at 11:02

## 2022-10-14 RX ADMIN — LIDOCAINE HYDROCHLORIDE 80 MG: 20 INJECTION, SOLUTION INTRAVENOUS at 11:01

## 2022-10-14 RX ADMIN — FENTANYL CITRATE 50 MCG: 50 INJECTION, SOLUTION INTRAMUSCULAR; INTRAVENOUS at 12:45

## 2022-10-14 RX ADMIN — FENTANYL CITRATE 100 MCG: 50 INJECTION, SOLUTION INTRAMUSCULAR; INTRAVENOUS at 11:01

## 2022-10-14 RX ADMIN — SODIUM CHLORIDE, POTASSIUM CHLORIDE, SODIUM LACTATE AND CALCIUM CHLORIDE: 600; 310; 30; 20 INJECTION, SOLUTION INTRAVENOUS at 10:16

## 2022-10-14 RX ADMIN — GLYCOPYRROLATE 2 MG: 0.2 INJECTION, SOLUTION INTRAMUSCULAR; INTRAVENOUS at 11:57

## 2022-10-14 RX ADMIN — PHENYLEPHRINE HYDROCHLORIDE 100 MCG: 10 INJECTION INTRAVENOUS at 11:21

## 2022-10-14 RX ADMIN — OXYCODONE HYDROCHLORIDE 5 MG: 5 TABLET ORAL at 13:21

## 2022-10-14 RX ADMIN — PHENYLEPHRINE HYDROCHLORIDE 100 MCG: 10 INJECTION INTRAVENOUS at 11:51

## 2022-10-14 RX ADMIN — CEFAZOLIN 2 MG: 2 INJECTION, POWDER, FOR SOLUTION INTRAMUSCULAR; INTRAVENOUS at 10:56

## 2022-10-14 RX ADMIN — ROCURONIUM BROMIDE 50 MG: 10 SOLUTION INTRAVENOUS at 11:01

## 2022-10-14 RX ADMIN — ONDANSETRON 4 MG: 2 INJECTION INTRAMUSCULAR; INTRAVENOUS at 12:19

## 2022-10-14 RX ADMIN — PHENYLEPHRINE HYDROCHLORIDE 100 MCG: 10 INJECTION INTRAVENOUS at 11:19

## 2022-10-14 RX ADMIN — SUGAMMADEX 200 MG: 100 INJECTION, SOLUTION INTRAVENOUS at 12:27

## 2022-10-14 RX ADMIN — DEXAMETHASONE SODIUM PHOSPHATE 4 MG: 4 INJECTION, SOLUTION INTRAMUSCULAR; INTRAVENOUS at 11:14

## 2022-10-14 RX ADMIN — PHENYLEPHRINE HYDROCHLORIDE 100 MCG: 10 INJECTION INTRAVENOUS at 12:06

## 2022-10-14 RX ADMIN — PHENYLEPHRINE HYDROCHLORIDE 100 MCG: 10 INJECTION INTRAVENOUS at 11:52

## 2022-10-14 ASSESSMENT — PAIN DESCRIPTION - LOCATION
LOCATION: ABDOMEN

## 2022-10-14 ASSESSMENT — PAIN DESCRIPTION - DESCRIPTORS
DESCRIPTORS: PRESSURE;SHARP
DESCRIPTORS: SHARP
DESCRIPTORS: PRESSURE;SHARP
DESCRIPTORS: SHARP

## 2022-10-14 ASSESSMENT — PAIN DESCRIPTION - FREQUENCY
FREQUENCY: CONTINUOUS
FREQUENCY: INTERMITTENT
FREQUENCY: CONTINUOUS

## 2022-10-14 ASSESSMENT — PAIN SCALES - GENERAL
PAINLEVEL_OUTOF10: 4
PAINLEVEL_OUTOF10: 3
PAINLEVEL_OUTOF10: 7
PAINLEVEL_OUTOF10: 8
PAINLEVEL_OUTOF10: 8
PAINLEVEL_OUTOF10: 4

## 2022-10-14 ASSESSMENT — PAIN DESCRIPTION - PAIN TYPE
TYPE: SURGICAL PAIN

## 2022-10-14 ASSESSMENT — PAIN DESCRIPTION - ORIENTATION
ORIENTATION: MID

## 2022-10-14 ASSESSMENT — PAIN - FUNCTIONAL ASSESSMENT
PAIN_FUNCTIONAL_ASSESSMENT: ACTIVITIES ARE NOT PREVENTED
PAIN_FUNCTIONAL_ASSESSMENT: 0-10
PAIN_FUNCTIONAL_ASSESSMENT: PREVENTS OR INTERFERES SOME ACTIVE ACTIVITIES AND ADLS

## 2022-10-14 ASSESSMENT — PAIN DESCRIPTION - ONSET
ONSET: ON-GOING
ONSET: GRADUAL
ONSET: ON-GOING

## 2022-10-14 ASSESSMENT — ENCOUNTER SYMPTOMS: SHORTNESS OF BREATH: 0

## 2022-10-14 NOTE — OP NOTE
Operative Note  Operative Report    Patient ID:  Yenifer Bhatt  [id number]  82 y.o.  1940      Pre-operative Diagnosis: Left Inguinal hernia     Post-operative Diagnosis: same    Procedure: 1. Robotic-Assisted Left RADHA inguinal hernia repair with mesh    2. Incisional hernia repair- separate hernia from his indirect inguinal hernia    Surgeon: Amanda Han MD    First Assistant: Maria A Lin PA-C  The use of a first assistant was necessary for the proper positioning, prepping, and draping of the patient, intraoperative retraction, passing sutures and implants(like mesh), stapling bowel and vessels using  devises when necessary, and suction using laparoscopic instruments, exchanging DaVinci robotic instruments, passing sutures and closure of skin and subcutaneous tissues. Findings:  with additional incisional hernia noted  cephalad to the indirect hernia     Estimated Blood Loss:  Minimal           Total IV Fluids: 500 ml            Complications:  None; patient tolerated the procedure well. Disposition: PACU - hemodynamically stable. Condition: stable    Implants:    Implant Name Type Inv. Item Serial No.  Lot No. LRB No. Used Action   MESH SARITA [id number] POLYPR NONABSORBABLE SYN SQ PROL - [id number]  MESH SARITA [id number] POLYPR NONABSORBABLE SYN SQ PROL  [employer] SCBHXM Left 1 Implanted        Procedure Details: The patient was seen again in the Holding Room. The risks, benefits, complications, treatment options, and expected outcomes were discussed with the patient. The possibilities of reaction to medication, pulmonary aspiration, perforation of viscus, bleeding, recurrent infection, the need for additional procedures, and development of a complication requiring transfusion or further operation were discussed with the patient and/or family. There was concurrence with the proposed plan, and informed consent was obtained.   The site of surgery was properly noted/marked. The patient was taken to the Operating Room and the procedure verified. A Time Out was held and the above information confirmed. Indications: symptomatic Left Inguinal hernia    Description of Procedure: The patient was brought to the operating room, and the site of surgery was verified. The patient was then placed supine with the arms tucked at the sides. After obtaining adequate anesthesia, the patients abdomen was prepped and draped in a standard sterile fashion. The patient was placed in the Trendelenburg position. Using a 5-mm optical trocar, the right mid abdominal quadrant was entered without incident. Pneumoperitoneum was created with insufflation to 12 mmHg. Two additional 8-mm trocar and 8-mm trocar were placed lateral to the rectus sheath under direct vision. The robot was docked without difficulty. Both inguinal regions were inspected. There were incisional and left indirect inguinal hernias noted. Then the median and medial umbilical ligaments were divided sharply with electrocautery to achieve the optimal exposure. The preperitoneum was incised with endoscopic scissors along a line of 2 cm above the superior edge of the hernia defect, extending from the median umbilical ligament to the anterior-superior iliac spine. The  peritoneal flap was mobilized inferiorly along with blunt and sharp dissection. The inferior epigastric vessels were exposed, and the pubic symphysis was identified. Coopers ligament was dissected to its junction with the femoral vein. The dissection was continued inferiorly to the iliopubic tract with care taken to avoid injury to the femoral branch of the genitofemoral nerve and the lateral femoral cutaneous nerve. The left indirect inguinal hernia sac was identified and reduced with gentle traction. The indirect inguinal hernia sac was also noted to be moderate  and was easily mobilized from the cord structures and reduced into the preperitoneal cavity.  Then the incisional hernia was repaired with #1 Stratafix primarily and re-inforced with prolene mesh. A large prolene mesh was used in the preperitoneal space. The mesh was secured using 0- Vicryl in two places. The preperitoneal flap was closed over the mesh using the O-vicryl as well. After ensuring the adequate hemostasis using electrocautery, the trocars were removed and the pneumoperitoneum evacuated. The trocar incision was closed using 4-0 Vicryl, and dry dressings were applied as the final dressing. The patient tolerated the procedure well and was taken to the post-anesthesia care unit in stable condition. Instrument and lap counts were correct at the end of the case.      Meche May MD

## 2022-10-14 NOTE — DISCHARGE INSTRUCTIONS
Discharge Instructions for Abdominal Hernia   Dr Vince Main PA-C  ([phone] ACTIVITY:      BATHING: OK to shower but no bath tub or submerging incision under water. You may shower beginning the second day after surgery. Wound:  Keep wound dry and clean. May shower as instructed above. Dressing:  If you have a dressing over your belly button: You may remove your surgical dressing 2 days after surgery before you shower. Your incision is covered with glue that will fall off on its own with time. DRIVING: No driving until off narcotic pain medications and walking comfortably    RETURN TO WORK: When cleared by your surgeon    WALKING:  As tolerated     STAIRS:  As tolerated    Diet    Some pain medicine can cause constipation . To avoid this problem:   Drink plenty of fluids. Eat foods high in fiber , such as:   Whole grain cereals and breads   Fruits and vegetables   Legumes (eg, beans, lentils)   Physical Activity    Less than 10 pounds until cleared by your surgeon  Ask the doctor when you can return to normal activities. Wear your abdominal binder as needed for comfort      In addition:   Ask the doctor when you will be able to return to work. Do not drive unless your doctor has given you permission to do so. Medications     Take your pain medications as instructed. Resume your home medications as instructed. Lifestyle Changes   You and your doctor will plan lifestyle changes that will aid in recovery. If you smoke, your doctor may recommend that you quit . Smoking can cause chronic cough , a risk factor for this condition. Prevention   To prevent hernias from recurring:   Maintain a healthy weight . Strengthen abdominal muscles. Avoid heavy lifting. Get treated for chronic conditions, like constipation, allergies, or chronic coughing. Follow-up   The doctor will monitor this condition. You may need more exams. Go to all of your appointments. Call Dr Patience Davenport at [phone]  If Any of the Following Occurs   After you leave the hospital, call your doctor if any of the following occurs:   Pain that worsens   Other new symptoms   Signs of infection, including fever and chills   Nausea and/or vomiting that you can't control with the medications you were given   Pain that you can't control with the medications you've been given   Pain, burning, urgency or frequency of urination, or persistent bleeding in the urine   Excessive tenderness or swelling   Changes in bowel or sexual function   Dizziness or lightheadedness   Rash or hives   Call 911 or go to the emergency room immediately if any of the following occurs:   Cough, shortness of breath, or chest pain   Rapid, irregular heartbeat; chest pain   If you think you have an emergency            [facility]  [phone]    Do not drive, work around [address] [place] [address] or use equipment. Do not drink any alcoholic beverages. Do not smoke while alone. Avoid making important decisions. Plan to spend a quiet, relaxed evening @ home. Resume normal activities as you begin to feel better. Eat lightly for your first meal, then gradually increase your diet to what is normal for you. In case of nausea, avoid food and drink only clear liquids. Resume food as nausea ceases. Notify your surgeon if you experience fever, chills, large amount of bleeding, difficulty breathing, persistent nausea and vomiting or any other disturbing problem. Call for a follow-up appointment with your surgeon.

## 2022-10-14 NOTE — ANESTHESIA POSTPROCEDURE EVALUATION
Department of Anesthesiology  Postprocedure Note    Patient: Esther Keane  MRN: [id number]  YOB: 1940  Date of evaluation: 10/14/2022      Procedure Summary     Date: 10/14/22 Room / Location: [address] [place] [address]    Anesthesia Start: 1056 Anesthesia Stop: 1233    Procedure: LEFT HERNIA INGUINAL REPAIR LAPAROSCOPIC ROBOTIC (Left: Abdomen) Diagnosis:       Inguinal hernia without obstruction or gangrene, recurrence not specified, unspecified laterality      (Inguinal hernia without obstruction or gangrene, recurrence not specified, unspecified laterality [K40.90])    Surgeons: Madi Brock MD Responsible Provider: Radha Eng DO    Anesthesia Type: General ASA Status: 3          Anesthesia Type: General    Emily Phase I: Emily Score: 7    Emily Phase II:        Anesthesia Post Evaluation    Patient location during evaluation: PACU  Patient participation: waiting for patient participation  Level of consciousness: awake  Pain score: 0  Airway patency: patent  Nausea & Vomiting: no vomiting  Complications: no  Cardiovascular status: hemodynamically stable and blood pressure returned to baseline  Respiratory status: acceptable, airway suctioned and face mask  Hydration status: euvolemic

## 2022-10-14 NOTE — PROGRESS NOTES
1308 Pt. Brought back to unit, bedside report received from Ray Santillan [facility]. Pt. Is A&O, VSS, Pt provided with cracker and beverage of choice. Call light in reach. Family at bedside. 1321 Pt. Medicated for pain per MD order. Pt. Educated on how to cough and deep breath appropriately with the abdominal splinting. Pt. Expresses understanding. 1400 DC instructions reviewed with pt and family, all parties express understanding. 1414 Pt. To DC home in private vehicle.

## 2022-10-14 NOTE — BRIEF OP NOTE
Brief Postoperative Note      Patient: Aniya Barnard  YOB: 1940  MRN: [id number]    Date of Procedure: 10/14/2022    Pre-Op Diagnosis: Inguinal hernia without obstruction or gangrene, recurrence not specified, unspecified laterality [K40.90]    Post-Op Diagnosis: Same Plus left lower quadrant ventral hernia       Procedure(s):  LEFT HERNIA INGUINAL REPAIR LAPAROSCOPIC ROBOTIC, Ventral hernia repair    Surgeon(s):  Singh Sandoval MD    Assistant:  First Assistant: Jose Stephenson PA-C    Anesthesia: General    Estimated Blood Loss (mL): Minimal    Complications: None    Specimens:   * No specimens in log *    Implants:  Implant Name Type Inv.  Item Serial No.  Lot No. LRB No. Used Action   MESH SARITA [id number] POLYPR NONABSORBABLE SYN SQ PROL - [id number]  MESH SARITA [id number] POLYPR NONABSORBABLE SYN SQ PROL  [employer] SCBHXM Left 1 Implanted         Drains:   NG/OG/NJ/NE Tube Orogastric 18 fr Center mouth (Active)       [REMOVED] Urinary Catheter 10/14/22 Meraz (Removed)       Findings: As Above    Electronically signed by Jose Stephenson PA-C on 10/14/2022 at 12:30 PM

## 2022-10-14 NOTE — PROGRESS NOTES
1235 Patient arrived to PACU from OR. Monitors applied and alarms on. No drainage from sites. Report from eB. 1246 Patient turned and repositioned in bed.  1251 Patient tolerating ice chips. 1301 Patient transferred out of PACU to same day surgery. Report to [employer].

## 2022-10-17 ENCOUNTER — HOSPITAL ENCOUNTER (OUTPATIENT)
Age: 82
Discharge: HOME | End: 2022-10-17
Payer: MEDICARE

## 2022-10-17 ENCOUNTER — TELEPHONE (OUTPATIENT)
Dept: SURGERY | Age: 82
End: 2022-10-17

## 2022-10-17 DIAGNOSIS — R35.0 FREQUENT URINATION: Primary | ICD-10-CM

## 2022-10-17 LAB
BACTERIA: ABNORMAL /HPF
BILIRUBIN URINE: NEGATIVE MG/DL
BLOOD, URINE: NEGATIVE
CAST TYPE: ABNORMAL /HPF
CLARITY: CLEAR
COLOR: YELLOW
CRYSTAL TYPE: ABNORMAL /HPF
EPITHELIAL CELLS, UA: 2 /HPF
GLUCOSE, URINE: NEGATIVE MG/DL
KETONES, URINE: NEGATIVE MG/DL
LEUKOCYTE ESTERASE, URINE: NEGATIVE
NITRITE URINE, QUANTITATIVE: NEGATIVE
PH, URINE: 6 (ref 5–8)
PROTEIN UA: ABNORMAL MG/DL
RBC URINE: 3 /HPF (ref 0–3)
SPECIFIC GRAVITY UA: 1.01 (ref 1–1.03)
UROBILINOGEN, URINE: 0.2 MG/DL (ref 0.2–1)
WBC UA: 8 /HPF (ref 0–2)

## 2022-10-17 PROCEDURE — 81001 URINALYSIS AUTO W/SCOPE: CPT

## 2022-10-17 NOTE — TELEPHONE ENCOUNTER
Luis called and expressing concern with freq urination. Spoke to Dr Guillermo Merino, he is wanting to get an UA to rule out UTI. UA ordered. Called and spoke to Eunice Vieira, informed him that he can get UA done at [facility] when he at his convenience.

## 2022-10-18 RX ORDER — SULFAMETHOXAZOLE AND TRIMETHOPRIM 800; 160 MG/1; MG/1
1 TABLET ORAL 2 TIMES DAILY
Qty: 14 TABLET | Refills: 0 | Status: SHIPPED | OUTPATIENT
Start: 2022-10-18 | End: 2022-10-25

## 2022-10-18 NOTE — TELEPHONE ENCOUNTER
Luis called asking for the results of his UA. Explained will need to reach out to Dr Ron Fatima before I can release results. Messaged Dr Ron Fatima. Per Dr Ron Fatima will need Bactrim DS for 1 week for UTI.

## 2022-10-20 ENCOUNTER — TELEPHONE (OUTPATIENT)
Dept: CARDIOLOGY CLINIC | Age: 82
End: 2022-10-20

## 2022-10-20 NOTE — TELEPHONE ENCOUNTER
Cardiologist: Dr. Cesia Perez  Surgeon: Dr. Marlen Quan   Surgery: Skin Cancer Removal    Anesthesia: Not Listed  Date: 12/08/2022  FAX# [phone]    # [id number]    Last OV 06/06/2022 w/Dr. Cesia Perez    Requesting to hold Plavix x 5 days.

## 2022-10-26 ENCOUNTER — OFFICE VISIT (OUTPATIENT)
Dept: SURGERY | Age: 82
End: 2022-10-26

## 2022-10-26 VITALS
WEIGHT: 175 LBS | DIASTOLIC BLOOD PRESSURE: 66 MMHG | HEART RATE: 92 BPM | HEIGHT: 71 IN | BODY MASS INDEX: 24.5 KG/M2 | SYSTOLIC BLOOD PRESSURE: 116 MMHG

## 2022-10-26 DIAGNOSIS — Z09 POSTOPERATIVE EXAMINATION: Primary | ICD-10-CM

## 2022-10-26 PROCEDURE — 99024 POSTOP FOLLOW-UP VISIT: CPT | Performed by: SURGERY

## 2022-11-20 NOTE — PROGRESS NOTES
Chief Complaint   Patient presents with    Post-Op Check     1st P/O Jesus Left Inguinal Hernia Repair with non-related Incisional Hernia Repair @ [facility] 10/14/22         SUBJECTIVE:  Patient here for post op visit. Pain is minimal.  Wounds: minbruising and no discharge. Past Surgical History:   Procedure Laterality Date    ABDOMINAL HERNIA REPAIR      CATARACT REMOVAL      CHOLECYSTECTOMY, LAPAROSCOPIC  07/26/2022    CHOLECYSTECTOMY LAPAROSCOPIC performed by Dr. Hollice Lesches at [address] [place] [address], [address] N/A 7/26/2022    CHOLECYSTECTOMY LAPAROSCOPIC performed by Jose Noe MD at [address] [place] [address] CATH LAB PROCEDURE   10/6/03;10/16/03;10/5/04;3/12    PTCA with stents; 3-12 cont med therapy    HERNIA REPAIR Left 10/14/2022    LEFT HERNIA INGUINAL REPAIR LAPAROSCOPIC ROBOTIC performed by Jose Noe MD at [facility] OR     Past Medical History:   Diagnosis Date    Arthritis     Back pain, chronic     BPH (benign prostatic hyperplasia)     CAD (coronary artery disease)     stents RCA, Cx, LAD    Cancer ([address] 12/03/2012    skin cancer removed    Family history of coronary artery disease     H/O cardiovascular stress test 6/6/17, 8/18/20    EF 50-60%. Mild anterior and septal wall ischemia. H/O Doppler ultrasound 08/13/2019    No evidence of DVT or SVT. There appears to be calcified thrombus with in the right small saphenous vein. Significant reflux noted of the Right CFV 5.0s. H/O Doppler ultrasound 07/21/2022    Mild (0-49%) disease of bilateral proximal ICA. Normal vetebral flow. H/O echocardiogram 1/12; 8/13/2019    EF 50-55%. Diastolic Dysfunction Grade I . Sclerotic, but non-stenotic aortic valve. Aortic root dimension upper limits of normal 3.7cm.      Hyperlipidemia     Hypertension     Leg swelling 08/05/2019    PAF (paroxysmal atrial fibrillation) (HCC)     Palpitation 04/25/2022    S/P angioplasty with stent 10/6/03;10/16/03;10/5/04 stent RCA; stent CX; stent LAD  &     SOB (shortness of breath) 2017    Torsade de pointes 2012     Family History   Problem Relation Age of Onset    Coronary Art Dis Mother     Hypertension Mother     Heart Attack Mother     Coronary Art Dis Brother      Social History     Socioeconomic History    Marital status:      Spouse name: Not on file    Number of children: Not on file    Years of education: Not on file    Highest education level: Not on file   Occupational History    Not on file   Tobacco Use    Smoking status: Former     Years: 25.00     Types: Cigarettes, Pipe     Quit date: 1980     Years since quittin.9    Smokeless tobacco: Never   Vaping Use    Vaping Use: Never used   Substance and Sexual Activity    Alcohol use: No     Comment: occassional    Drug use: No    Sexual activity: Yes     Partners: Female     Comment:    Other Topics Concern    Not on file   Social History Narrative    Not on file     Social Determinants of Health     Financial Resource Strain: Not on file   Food Insecurity: Not on file   Transportation Needs: Not on file   Physical Activity: Not on file   Stress: Not on file   Social Connections: Not on file   Intimate Partner Violence: Not on file   Housing Stability: Not on file       OBJECTIVE:   Physical Exam    Wound well healed without signs of active infection. Suture line intact. Abdomen soft, nontender, nondistended. ASSESSMENT:  Patient doing well on this post operative check. Wounds well healed. 1. Postoperative examination        PLAN:    Continue same  Increase activity as tolerated        No orders of the defined types were placed in this encounter. No orders of the defined types were placed in this encounter. Follow Up: No follow-ups on file.     Gris Mahajan MD

## 2022-11-22 ENCOUNTER — HOSPITAL ENCOUNTER (OUTPATIENT)
Age: 82
Discharge: HOME | End: 2022-11-22
Payer: MEDICARE

## 2022-11-22 PROCEDURE — 36415 COLL VENOUS BLD VENIPUNCTURE: CPT

## 2022-11-22 PROCEDURE — 84154 ASSAY OF PSA FREE: CPT

## 2022-11-22 PROCEDURE — 84153 ASSAY OF PSA TOTAL: CPT

## 2022-11-23 LAB
PROSTATE SPECIFIC ANTIGEN FREE: 1.2 NG/ML
PROSTATE SPECIFIC ANTIGEN PERCENT FREE: 30 %
PROSTATE SPECIFIC ANTIGEN: 4 NG/ML (ref 0–4)

## 2022-12-07 ENCOUNTER — HOSPITAL ENCOUNTER (OUTPATIENT)
Age: 82
Discharge: HOME | End: 2022-12-07
Payer: MEDICARE

## 2022-12-07 LAB
ANION GAP SERPL CALCULATED.3IONS-SCNC: 13 MMOL/L (ref 4–16)
BUN BLDV-MCNC: 32 MG/DL (ref 6–23)
CALCIUM SERPL-MCNC: 9.3 MG/DL (ref 8.3–10.6)
CHLORIDE BLD-SCNC: 104 MMOL/L (ref 99–110)
CO2: 25 MMOL/L (ref 21–32)
CREAT SERPL-MCNC: 1 MG/DL (ref 0.9–1.3)
GFR SERPL CREATININE-BSD FRML MDRD: >60 ML/MIN/1.73M2
GLUCOSE FASTING: 135 MG/DL (ref 70–99)
MAGNESIUM: 2.2 MG/DL (ref 1.8–2.4)
POTASSIUM SERPL-SCNC: 4.7 MMOL/L (ref 3.5–5.1)
SODIUM BLD-SCNC: 142 MMOL/L (ref 135–145)

## 2022-12-07 PROCEDURE — 36415 COLL VENOUS BLD VENIPUNCTURE: CPT

## 2022-12-07 PROCEDURE — 83735 ASSAY OF MAGNESIUM: CPT

## 2022-12-07 PROCEDURE — 80048 BASIC METABOLIC PNL TOTAL CA: CPT

## 2022-12-12 ENCOUNTER — OFFICE VISIT (OUTPATIENT)
Dept: CARDIOLOGY CLINIC | Age: 82
End: 2022-12-12
Payer: MEDICARE

## 2022-12-12 VITALS
BODY MASS INDEX: 24.27 KG/M2 | HEART RATE: 77 BPM | RESPIRATION RATE: 16 BRPM | OXYGEN SATURATION: 100 % | HEIGHT: 71 IN | WEIGHT: 173.38 LBS | SYSTOLIC BLOOD PRESSURE: 120 MMHG | DIASTOLIC BLOOD PRESSURE: 70 MMHG

## 2022-12-12 DIAGNOSIS — E78.00 PURE HYPERCHOLESTEROLEMIA: ICD-10-CM

## 2022-12-12 DIAGNOSIS — Z95.820 S/P ANGIOPLASTY WITH STENT: Primary | ICD-10-CM

## 2022-12-12 DIAGNOSIS — R00.2 PALPITATION: ICD-10-CM

## 2022-12-12 DIAGNOSIS — I48.0 PAF (PAROXYSMAL ATRIAL FIBRILLATION) (HCC): ICD-10-CM

## 2022-12-12 PROCEDURE — 3078F DIAST BP <80 MM HG: CPT | Performed by: INTERNAL MEDICINE

## 2022-12-12 PROCEDURE — 99214 OFFICE O/P EST MOD 30 MIN: CPT | Performed by: INTERNAL MEDICINE

## 2022-12-12 PROCEDURE — 93000 ELECTROCARDIOGRAM COMPLETE: CPT | Performed by: INTERNAL MEDICINE

## 2022-12-12 PROCEDURE — G8484 FLU IMMUNIZE NO ADMIN: HCPCS | Performed by: INTERNAL MEDICINE

## 2022-12-12 PROCEDURE — 1036F TOBACCO NON-USER: CPT | Performed by: INTERNAL MEDICINE

## 2022-12-12 PROCEDURE — G8428 CUR MEDS NOT DOCUMENT: HCPCS | Performed by: INTERNAL MEDICINE

## 2022-12-12 PROCEDURE — 3074F SYST BP LT 130 MM HG: CPT | Performed by: INTERNAL MEDICINE

## 2022-12-12 PROCEDURE — G8420 CALC BMI NORM PARAMETERS: HCPCS | Performed by: INTERNAL MEDICINE

## 2022-12-12 PROCEDURE — 1123F ACP DISCUSS/DSCN MKR DOCD: CPT | Performed by: INTERNAL MEDICINE

## 2022-12-12 RX ORDER — CLOPIDOGREL BISULFATE 75 MG/1
75 TABLET ORAL DAILY
Qty: 90 TABLET | Refills: 3 | Status: SHIPPED | OUTPATIENT
Start: 2022-12-12

## 2022-12-12 RX ORDER — AMLODIPINE BESYLATE AND BENAZEPRIL HYDROCHLORIDE 5; 20 MG/1; MG/1
1 CAPSULE ORAL DAILY
Qty: 90 CAPSULE | Refills: 3 | Status: SHIPPED | OUTPATIENT
Start: 2022-12-12

## 2022-12-12 NOTE — ASSESSMENT & PLAN NOTE
Clinically stable. Continue aggressive risk factor modification for primary prevention. Continue to exercise regularly.

## 2022-12-12 NOTE — PROGRESS NOTES
Doretha Escobar  1940  Ela Carpio MD      Chief Complaint   Patient presents with    6 Month Follow-Up    Hypertension    Hyperlipidemia    Atrial Fibrillation     Chief complaint and HPI:  Doretha Escobar  is a 78 y.o. male following up for known coronary artery disease and hyperlipidemia and history of paroxysmal atrial fibrillation well-controlled on amiodarone therapy. Patient had cholecystectomy in July and more recently last month at double hernia repair and tolerated the procedures well and did not have any perioperative atrial fibrillation or arrhythmias. He has been going to [facility] 3 days a week to exercise and denies any chest pains or unusual shortness of breath or palpitations or dizziness. Medications are reviewed. He had the skin cancer treatment for his face and has multiple bandages. Rest of the Cardiovascular system review is otherwise unchanged from prior encounter. Past medical history:  has a past medical history of Arthritis, Back pain, chronic, BPH (benign prostatic hyperplasia), CAD (coronary artery disease), Cancer ([address] Family history of coronary artery disease, H/O cardiovascular stress test, H/O Doppler ultrasound, H/O Doppler ultrasound, H/O echocardiogram, Hyperlipidemia, Hypertension, Leg swelling, PAF (paroxysmal atrial fibrillation) ([address] Palpitation, S/P angioplasty with stent, SOB (shortness of breath), and Torsade de pointes. Past surgical history:  has a past surgical history that includes Abdominal hernia repair; Cataract removal; Diagnostic Cardiac Cath Lab Procedure ( 10/6/03;10/16/03;10/5/04;3/12); Coronary angioplasty with stent; Cholecystectomy, laparoscopic (2022); Cholecystectomy, laparoscopic (N/A, 2022); and hernia repair (Left, 10/14/2022).   Social History:   Social History     Tobacco Use    Smoking status: Former     Years: 25.00     Types: Cigarettes, Pipe     Quit date: 1980     Years since quittin.9    Smokeless tobacco: Never   Substance Use Topics    Alcohol use: No     Comment: occassional     Family history: family history includes Coronary Art Dis in his brother and mother; Heart Attack in his mother; Hypertension in his mother. ALLERGIES:  Patient has no known allergies. Prior to Admission medications    Medication Sig Start Date End Date Taking? Authorizing Provider   Boswellia-Glucosamine-Vit D (OSTEO BI-FLEX ONE PER DAY PO) Take by mouth   Yes Historical Provider, MD   amLODIPine-benazepril (LOTREL) 5-20 MG per capsule Take 1 capsule by mouth daily 12/12/22  Yes Marlow Alpers, MD   clopidogrel (PLAVIX) 75 MG tablet Take 1 tablet by mouth daily 12/12/22  Yes Marlow Alpers, MD   Multiple Vitamins-Minerals (PRESERVISION AREDS 2+MULTI VIT PO)  9/13/22  Yes Historical Provider, MD   amiodarone (CORDARONE) 200 MG tablet TAKE 0.5 TABLETS BY MOUTH DAILY 6/21/22  Yes Marlow Alpers, MD   atorvastatin (LIPITOR) 40 MG tablet Take 1 tablet by mouth daily 6/6/22  Yes Marlow Alpers, MD   indapamide (LOZOL) 1.25 MG tablet Take 1 tablet by mouth every morning 6/6/22  Yes Marlow Alpers, MD   aspirin 81 MG EC tablet Take 81 mg by mouth daily   Yes Historical Provider, MD     Vitals:    12/12/22 0922   BP: 120/70   Pulse: 77   Resp: 16   SpO2: 100%   Weight: 173 lb 6 oz (78.6 kg)   Height: 5' 11\" (1.803 m)      Body mass index is 24.18 kg/m². Wt Readings from Last 3 Encounters:   12/12/22 173 lb 6 oz (78.6 kg)   10/26/22 175 lb (79.4 kg)   10/06/22 175 lb (79.4 kg)     Constitutional:  Patient is well-built and nourished male in no apparent distress. Eyes: Is wearing glasses. No conjunctival pallor noted. NECK: No JVP or thyromegaly  Cardiovascular: Auscultation: Normal S1 and S2. No significant murmurs or gallops noted. Carotids are negative for bruits. Respiratory:  Respiratory effort is normal. Breath sounds are clear to auscultation. Extremities:  No edema, clubbing,  Cyanosis, petechiae.    SKIN: Warm and well perfused, no pallor or cyanosis  Neurologic:  Oriented to time, place, and person and non-anxious. No focal neurological deficit noted. Psychiatric: Normal mood and effect. EKG today is consistent with normal sinus rhythm 70 bpm with PACs and low QRS voltages in precordial leads. QTc is 397 ms. LAB REVIEW:  CBC:   Lab Results   Component Value Date/Time    WBC 10.1 10/14/2022 10:00 AM    HGB 13.1 10/14/2022 10:00 AM    HCT 39.7 10/14/2022 10:00 AM     10/14/2022 10:00 AM     Lipids:   Lab Results   Component Value Date    CHOL 101 05/11/2020    CHOLFAST 98 07/19/2022    TRIG 101 05/11/2020    TRIGLYCFAST 99 07/19/2022    HDL 34 (L) 07/19/2022    LDLCALC 44 07/19/2022     Renal:   Lab Results   Component Value Date/Time    BUN 32 12/07/2022 09:00 AM    CREATININE 1.0 12/07/2022 09:00 AM     12/07/2022 09:00 AM    K 4.7 12/07/2022 09:00 AM     PT/INR:   Lab Results   Component Value Date/Time    INR 0.96 06/05/2013 05:15 PM     IMPRESSION and RECOMMENDATIONS:      1. CAD S/P PCI with stents Cx+LAD 3278,3080  Assessment & Plan:  Clinically stable. Continue aggressive risk factor modification for primary prevention. Continue to exercise regularly. 2. Palpitation  Assessment & Plan:  Patient denies any palpitations on current medical therapy. Orders:  -     EKG 12 lead  3. PAF (paroxysmal atrial fibrillation) ([facility])  Assessment & Plan:  Well-controlled on amiodarone 100 mg daily. He is tolerating it well. 4. Pure hypercholesterolemia  Assessment & Plan:  Recent LDL was 44 on current dose of atorvastatin 40 mg daily. Continue the same. Continue current cardiovascular medications which have been reviewed and discussed individually with you. Continue to exercise regularly. Appropriate prescriptions if needed on this visit are addressed. After visit summery is provided. Questions answered and patient verbalizes understanding. Follow up in 6 months,  sooner if needed.     Claudette Price MD, 12/12/2022 9:49 AM     Please note this report has been partially produced using speech recognition software and may contain errors related to that system including errors in grammar, punctuation, and spelling, as well as words and phrases that may be inappropriate. If there are any questions or concerns please feel free to contact the dictating provider for clarification.

## 2023-03-07 ENCOUNTER — OFFICE VISIT (OUTPATIENT)
Dept: CARDIOLOGY CLINIC | Age: 83
End: 2023-03-07
Payer: MEDICARE

## 2023-03-07 VITALS
HEART RATE: 55 BPM | HEIGHT: 71 IN | BODY MASS INDEX: 24.42 KG/M2 | WEIGHT: 174.4 LBS | DIASTOLIC BLOOD PRESSURE: 80 MMHG | SYSTOLIC BLOOD PRESSURE: 110 MMHG

## 2023-03-07 DIAGNOSIS — Z95.820 S/P ANGIOPLASTY WITH STENT: ICD-10-CM

## 2023-03-07 DIAGNOSIS — I48.0 PAF (PAROXYSMAL ATRIAL FIBRILLATION) (HCC): ICD-10-CM

## 2023-03-07 DIAGNOSIS — E78.00 PURE HYPERCHOLESTEROLEMIA: ICD-10-CM

## 2023-03-07 DIAGNOSIS — Z01.818 PRE-OP EVALUATION: Primary | ICD-10-CM

## 2023-03-07 DIAGNOSIS — I10 PRIMARY HYPERTENSION: ICD-10-CM

## 2023-03-07 PROBLEM — K40.90 INGUINAL HERNIA WITHOUT OBSTRUCTION OR GANGRENE: Status: RESOLVED | Noted: 2022-10-14 | Resolved: 2023-03-07

## 2023-03-07 PROBLEM — K80.10 CCC (CHRONIC CALCULOUS CHOLECYSTITIS): Status: RESOLVED | Noted: 2022-07-26 | Resolved: 2023-03-07

## 2023-03-07 PROCEDURE — G8427 DOCREV CUR MEDS BY ELIG CLIN: HCPCS | Performed by: INTERNAL MEDICINE

## 2023-03-07 PROCEDURE — G8420 CALC BMI NORM PARAMETERS: HCPCS | Performed by: INTERNAL MEDICINE

## 2023-03-07 PROCEDURE — G8484 FLU IMMUNIZE NO ADMIN: HCPCS | Performed by: INTERNAL MEDICINE

## 2023-03-07 PROCEDURE — 3074F SYST BP LT 130 MM HG: CPT | Performed by: INTERNAL MEDICINE

## 2023-03-07 PROCEDURE — 1123F ACP DISCUSS/DSCN MKR DOCD: CPT | Performed by: INTERNAL MEDICINE

## 2023-03-07 PROCEDURE — 3079F DIAST BP 80-89 MM HG: CPT | Performed by: INTERNAL MEDICINE

## 2023-03-07 PROCEDURE — 99214 OFFICE O/P EST MOD 30 MIN: CPT | Performed by: INTERNAL MEDICINE

## 2023-03-07 PROCEDURE — 1036F TOBACCO NON-USER: CPT | Performed by: INTERNAL MEDICINE

## 2023-03-07 PROCEDURE — 93000 ELECTROCARDIOGRAM COMPLETE: CPT | Performed by: INTERNAL MEDICINE

## 2023-03-07 NOTE — ASSESSMENT & PLAN NOTE
Mostly well controlled on amiodarone therapy. He had 1 breakthrough episode in April of last year which was self-limiting. He has declined anticoagulation therapy in the past because he says he can tell when he is having it and has not had much recurrence is on amiodarone therapy except 1 last year otherwise wants to continue aspirin and Plavix for now and is already complaining about increased bruising.

## 2023-03-07 NOTE — ASSESSMENT & PLAN NOTE
Patient has known coronary artery disease and history of paroxysmal atrial fibrillation and abnormal nuclear stress test however he is asymptomatic now from ischemic heart disease point of view. Do not recommend any further work-up in the absence of any symptoms. Patient should be monitored closely for any cardiac arrhythmias or ischemic changes on the EKG or any ischemic symptoms perioperatively.   Hold aspirin and Plavix if needed for surgery for the shortest duration possible and resume it as soon as possible

## 2023-03-07 NOTE — PATIENT INSTRUCTIONS
He is considered intermediate risk for cardiac complications from anticipated knee replacement. Recommend cardiac monitoring for arrhythmia or ischemia. Continue current cardiovascular medications which have been reviewed and discussed individually with you. Appropriate prescriptions if needed on this visit are addressed. After visit summery is provided. Questions answered and patient verbalizes understanding. Follow up in 6 months,  sooner if needed.

## 2023-03-07 NOTE — PROGRESS NOTES
Sherry Stewart  1940  Angela Walker MD      Chief Complaint   Patient presents with    Coronary Artery Disease    Hyperlipidemia    Hypertension     No chest pain,,dizziness,no swelling,or palpitations  Pt does state occasionally will have SOB going up stairs      Chief complaint and HPI:  Sherry Stewart  is a 78 y.o. male following up for preop surgical evaluation as he is anticipating right knee replacement by Dr. Betty Grace. He has known coronary artery disease status post multiple interventions in the past and the last stress test in 2020 showed mild degree of ischemia. Patient has been asymptomatic and did not require any further intervention since then. He goes to [facility] and exercises regularly despite the arthritis stays very active. He also has history of paroxysmal atrial fibrillation last episode was in April of last year and he has been on amiodarone therapy. He has declined anticoagulation therapy and has been on aspirin and Plavix. When he stopped Plavix he had 1 episode of visual disturbance thought to be secondary to possible TIA and he is back on Plavix. He is complaining of increased bruising. Rest of the Cardiovascular system review is otherwise unchanged from prior encounter. Past medical history:  has a past medical history of Arthritis, Back pain, chronic, BPH (benign prostatic hyperplasia), CAD (coronary artery disease), Cancer ([address] Family history of coronary artery disease, H/O cardiovascular stress test, H/O Doppler ultrasound, H/O Doppler ultrasound, H/O echocardiogram, Hyperlipidemia, Hypertension, Leg swelling, PAF (paroxysmal atrial fibrillation) ([address] Palpitation, S/P angioplasty with stent, SOB (shortness of breath), and Torsade de pointes. Past surgical history:  has a past surgical history that includes Abdominal hernia repair; Cataract removal; Diagnostic Cardiac Cath Lab Procedure ( 10/6/03;10/16/03;10/5/04;3/12);  Coronary angioplasty with stent; Cholecystectomy, laparoscopic (2022); Cholecystectomy, laparoscopic (N/A, 2022); and hernia repair (Left, 10/14/2022). Social History:   Social History     Tobacco Use    Smoking status: Former     Years: 25.00     Types: Cigarettes, Pipe     Quit date: 1980     Years since quittin.2    Smokeless tobacco: Never   Substance Use Topics    Alcohol use: No     Comment: occassional     Family history: family history includes Coronary Art Dis in his brother and mother; Heart Attack in his mother; Hypertension in his mother. ALLERGIES:  Patient has no known allergies. Prior to Admission medications    Medication Sig Start Date End Date Taking? Authorizing Provider   amLODIPine-benazepril (LOTREL) 5-20 MG per capsule Take 1 capsule by mouth daily 22  Yes Daily Cohen MD   clopidogrel (PLAVIX) 75 MG tablet Take 1 tablet by mouth daily 22  Yes Daily Cohen MD   Multiple Vitamins-Minerals (PRESERVISION AREDS 2+MULTI VIT PO)  22  Yes Historical Provider, MD   amiodarone (CORDARONE) 200 MG tablet TAKE 0.5 TABLETS BY MOUTH DAILY 22  Yes Daily Cohen MD   atorvastatin (LIPITOR) 40 MG tablet Take 1 tablet by mouth daily 22  Yes Daily Cohen MD   indapamide (LOZOL) 1.25 MG tablet Take 1 tablet by mouth every morning 22  Yes Daily Cohen MD   aspirin 81 MG EC tablet Take 81 mg by mouth daily   Yes Historical Provider, MD   Boswellia-Glucosamine-Vit D (OSTEO BI-FLEX ONE PER DAY PO) Take by mouth  Patient not taking: Reported on 3/7/2023    Historical Provider, MD     Vitals:    23 1559   BP: 110/80   Pulse: 55   Weight: 174 lb 6.4 oz (79.1 kg)   Height: 5' 11.32\" (1.812 m)      Body mass index is 24.11 kg/m². Wt Readings from Last 3 Encounters:   23 174 lb 6.4 oz (79.1 kg)   22 173 lb 6 oz (78.6 kg)   10/26/22 175 lb (79.4 kg)     Constitutional:  Patient is normally built and nourished male in no apparent distress.   Eyes: He is wearing glasses no conjunctival pallor noted. NECK: No JVP or thyromegaly  Cardiovascular: Auscultation: Normal S1 and S2. No significant murmurs or gallops. Carotids are negative for bruits. Peripheral pulses: Pedal pulses are 1+. Respiratory:  Respiratory effort normal. Breath sounds are clear to auscultation. Extremities: Trace right ankle edema noted no significant swelling noted on the left side. SKIN: Warm and well perfused, no pallor or cyanosis  Neurologic:  Oriented to time, place, and person and non-anxious. No focal neurological deficit noted. Psychiatric: Normal mood and effect. EKG is consistent with sinus bradycardia 55 bpm with QTc of 436 ms. LAB REVIEW:  CBC:   Lab Results   Component Value Date/Time    WBC 10.1 10/14/2022 10:00 AM    HGB 13.1 10/14/2022 10:00 AM    HCT 39.7 10/14/2022 10:00 AM     10/14/2022 10:00 AM     Lipids:   Lab Results   Component Value Date    CHOL 101 05/11/2020    CHOLFAST 98 07/19/2022    TRIG 101 05/11/2020    TRIGLYCFAST 99 07/19/2022    HDL 34 (L) 07/19/2022    LDLCALC 44 07/19/2022     Renal:   Lab Results   Component Value Date/Time    BUN 32 12/07/2022 09:00 AM    CREATININE 1.0 12/07/2022 09:00 AM     12/07/2022 09:00 AM    K 4.7 12/07/2022 09:00 AM     PT/INR:   Lab Results   Component Value Date/Time    INR 0.96 06/05/2013 05:15 PM     IMPRESSION and RECOMMENDATIONS:      1. Pre-op evaluation  Assessment & Plan:  Patient has known coronary artery disease and history of paroxysmal atrial fibrillation and abnormal nuclear stress test however he is asymptomatic now from ischemic heart disease point of view. Do not recommend any further work-up in the absence of any symptoms. Patient should be monitored closely for any cardiac arrhythmias or ischemic changes on the EKG or any ischemic symptoms perioperatively. Hold aspirin and Plavix if needed for surgery for the shortest duration possible and resume it as soon as possible   2.  Primary hypertension  Assessment & Plan:  Well-controlled on current combination of Lozol and amlodipine and benazepril. Continue the same. Orders:  -     EKG 12 lead; Future  3. CAD S/P PCI with stents Cx+LAD 2003,2004  Assessment & Plan:  Clinically stable continue aggressive risk factor modification for secondary prevention. 4. Pure hypercholesterolemia  Assessment & Plan:  Well-controlled on atorvastatin 40 mg daily and most recent LDL was 44 from July of last year. 5. PAF (paroxysmal atrial fibrillation) ([address]  Assessment & Plan:  Mostly well controlled on amiodarone therapy. He had 1 breakthrough episode in April of last year which was self-limiting. He has declined anticoagulation therapy in the past because he says he can tell when he is having it and has not had much recurrence is on amiodarone therapy except 1 last year otherwise wants to continue aspirin and Plavix for now and is already complaining about increased bruising. He is considered intermediate risk for cardiac complications from anticipated knee replacement. Recommend cardiac monitoring for arrhythmia or ischemia. Continue current cardiovascular medications which have been reviewed and discussed individually with you. Appropriate prescriptions if needed on this visit are addressed. After visit summery is provided. Questions answered and patient verbalizes understanding. Follow up in 6 months,  sooner if needed. Jules Patel MD, 3/7/2023 4:31 PM     Please note this report has been partially produced using speech recognition software and may contain errors related to that system including errors in grammar, punctuation, and spelling, as well as words and phrases that may be inappropriate. If there are any questions or concerns please feel free to contact the dictating provider for clarification.

## 2023-03-14 ENCOUNTER — TELEPHONE (OUTPATIENT)
Dept: CARDIOLOGY CLINIC | Age: 83
End: 2023-03-14

## 2023-04-06 PROBLEM — Z01.818 PRE-OP EVALUATION: Status: RESOLVED | Noted: 2023-03-07 | Resolved: 2023-04-06

## 2023-05-09 ENCOUNTER — HOSPITAL ENCOUNTER (INPATIENT)
Age: 83
DRG: 560 | End: 2023-05-09
Attending: PHYSICAL MEDICINE & REHABILITATION | Admitting: PHYSICAL MEDICINE & REHABILITATION
Payer: MEDICARE

## 2023-05-09 PROBLEM — M17.11 PRIMARY LOCALIZED OSTEOARTHRITIS OF RIGHT KNEE: Status: ACTIVE | Noted: 2023-05-09

## 2023-05-09 PROCEDURE — 94150 VITAL CAPACITY TEST: CPT

## 2023-05-09 PROCEDURE — 99223 1ST HOSP IP/OBS HIGH 75: CPT | Performed by: PHYSICAL MEDICINE & REHABILITATION

## 2023-05-09 PROCEDURE — 94761 N-INVAS EAR/PLS OXIMETRY MLT: CPT

## 2023-05-09 PROCEDURE — 6370000000 HC RX 637 (ALT 250 FOR IP): Performed by: PHYSICAL MEDICINE & REHABILITATION

## 2023-05-09 PROCEDURE — 1280000000 HC REHAB R&B

## 2023-05-09 PROCEDURE — 94664 DEMO&/EVAL PT USE INHALER: CPT

## 2023-05-09 RX ORDER — LISINOPRIL 20 MG/1
20 TABLET ORAL DAILY
Status: DISCONTINUED | OUTPATIENT
Start: 2023-05-10 | End: 2023-05-15 | Stop reason: HOSPADM

## 2023-05-09 RX ORDER — DOCUSATE SODIUM 100 MG/1
100 CAPSULE, LIQUID FILLED ORAL 2 TIMES DAILY
Status: DISCONTINUED | OUTPATIENT
Start: 2023-05-09 | End: 2023-05-15 | Stop reason: HOSPADM

## 2023-05-09 RX ORDER — POLYETHYLENE GLYCOL 3350 17 G/17G
17 POWDER, FOR SOLUTION ORAL DAILY PRN
Status: DISCONTINUED | OUTPATIENT
Start: 2023-05-09 | End: 2023-05-15 | Stop reason: HOSPADM

## 2023-05-09 RX ORDER — HYDROCHLOROTHIAZIDE 25 MG/1
25 TABLET ORAL DAILY
Status: DISCONTINUED | OUTPATIENT
Start: 2023-05-10 | End: 2023-05-15 | Stop reason: HOSPADM

## 2023-05-09 RX ORDER — ACETAMINOPHEN 325 MG/1
650 TABLET ORAL
Status: DISCONTINUED | OUTPATIENT
Start: 2023-05-09 | End: 2023-05-15 | Stop reason: HOSPADM

## 2023-05-09 RX ORDER — ONDANSETRON 4 MG/1
4 TABLET, ORALLY DISINTEGRATING ORAL 4 TIMES DAILY PRN
Status: DISCONTINUED | OUTPATIENT
Start: 2023-05-09 | End: 2023-05-15 | Stop reason: HOSPADM

## 2023-05-09 RX ORDER — CELECOXIB 200 MG/1
200 CAPSULE ORAL 2 TIMES DAILY
Status: DISCONTINUED | OUTPATIENT
Start: 2023-05-09 | End: 2023-05-15

## 2023-05-09 RX ORDER — ASPIRIN 81 MG/1
81 TABLET, CHEWABLE ORAL DAILY
Status: DISCONTINUED | OUTPATIENT
Start: 2023-05-10 | End: 2023-05-15 | Stop reason: HOSPADM

## 2023-05-09 RX ORDER — ATORVASTATIN CALCIUM 40 MG/1
40 TABLET, FILM COATED ORAL NIGHTLY
Status: DISCONTINUED | OUTPATIENT
Start: 2023-05-09 | End: 2023-05-15 | Stop reason: HOSPADM

## 2023-05-09 RX ORDER — OXYCODONE HYDROCHLORIDE 10 MG/1
10 TABLET ORAL EVERY 4 HOURS PRN
Status: DISCONTINUED | OUTPATIENT
Start: 2023-05-09 | End: 2023-05-15

## 2023-05-09 RX ORDER — AMIODARONE HYDROCHLORIDE 200 MG/1
100 TABLET ORAL DAILY
Status: DISCONTINUED | OUTPATIENT
Start: 2023-05-10 | End: 2023-05-15 | Stop reason: HOSPADM

## 2023-05-09 RX ORDER — OXYCODONE HYDROCHLORIDE 5 MG/1
5 TABLET ORAL EVERY 4 HOURS PRN
Status: DISCONTINUED | OUTPATIENT
Start: 2023-05-09 | End: 2023-05-15

## 2023-05-09 RX ORDER — SENNA PLUS 8.6 MG/1
1 TABLET ORAL 2 TIMES DAILY
Status: DISCONTINUED | OUTPATIENT
Start: 2023-05-09 | End: 2023-05-15 | Stop reason: HOSPADM

## 2023-05-09 RX ORDER — CLOPIDOGREL BISULFATE 75 MG/1
75 TABLET ORAL DAILY
Status: DISCONTINUED | OUTPATIENT
Start: 2023-05-10 | End: 2023-05-15 | Stop reason: HOSPADM

## 2023-05-09 RX ORDER — AMLODIPINE BESYLATE 5 MG/1
5 TABLET ORAL DAILY
Status: DISCONTINUED | OUTPATIENT
Start: 2023-05-10 | End: 2023-05-15 | Stop reason: HOSPADM

## 2023-05-09 RX ADMIN — ATORVASTATIN CALCIUM 40 MG: 40 TABLET, FILM COATED ORAL at 21:41

## 2023-05-09 RX ADMIN — SENNOSIDES 8.6 MG: 8.6 TABLET, FILM COATED ORAL at 21:40

## 2023-05-09 RX ADMIN — ACETAMINOPHEN 650 MG: 325 TABLET ORAL at 18:42

## 2023-05-09 RX ADMIN — DOCUSATE SODIUM 100 MG: 100 CAPSULE, LIQUID FILLED ORAL at 21:41

## 2023-05-09 RX ADMIN — OXYCODONE HYDROCHLORIDE 10 MG: 10 TABLET ORAL at 21:40

## 2023-05-09 RX ADMIN — CELECOXIB 200 MG: 200 CAPSULE ORAL at 21:42

## 2023-05-09 RX ADMIN — ACETAMINOPHEN 650 MG: 325 TABLET ORAL at 21:40

## 2023-05-09 ASSESSMENT — PAIN SCALES - GENERAL
PAINLEVEL_OUTOF10: 0
PAINLEVEL_OUTOF10: 0
PAINLEVEL_OUTOF10: 5
PAINLEVEL_OUTOF10: 2

## 2023-05-09 ASSESSMENT — PAIN DESCRIPTION - ONSET: ONSET: ON-GOING

## 2023-05-09 ASSESSMENT — PAIN DESCRIPTION - DESCRIPTORS: DESCRIPTORS: DISCOMFORT

## 2023-05-09 ASSESSMENT — PAIN DESCRIPTION - PAIN TYPE: TYPE: SURGICAL PAIN

## 2023-05-09 ASSESSMENT — PAIN DESCRIPTION - FREQUENCY: FREQUENCY: INTERMITTENT

## 2023-05-09 ASSESSMENT — PAIN DESCRIPTION - ORIENTATION
ORIENTATION: RIGHT
ORIENTATION: RIGHT

## 2023-05-09 ASSESSMENT — PAIN DESCRIPTION - LOCATION
LOCATION: KNEE
LOCATION: KNEE

## 2023-05-09 ASSESSMENT — PAIN - FUNCTIONAL ASSESSMENT: PAIN_FUNCTIONAL_ASSESSMENT: PREVENTS OR INTERFERES SOME ACTIVE ACTIVITIES AND ADLS

## 2023-05-10 PROBLEM — R53.1 GENERALIZED WEAKNESS: Status: ACTIVE | Noted: 2023-05-10

## 2023-05-10 PROBLEM — I50.32 CHRONIC DIASTOLIC (CONGESTIVE) HEART FAILURE (HCC): Status: ACTIVE | Noted: 2023-05-10

## 2023-05-10 PROBLEM — Z96.651 STATUS POST RIGHT KNEE REPLACEMENT: Status: ACTIVE | Noted: 2023-05-10

## 2023-05-10 PROBLEM — D62 ACUTE BLOOD LOSS ANEMIA: Status: ACTIVE | Noted: 2023-05-10

## 2023-05-10 PROBLEM — R26.9 GAIT DISTURBANCE: Status: ACTIVE | Noted: 2023-05-10

## 2023-05-10 PROBLEM — R52 UNCONTROLLED PAIN: Status: ACTIVE | Noted: 2023-05-10

## 2023-05-10 LAB
BILIRUBIN URINE: NEGATIVE MG/DL
BLOOD, URINE: NEGATIVE
CLARITY: CLEAR
COLOR: YELLOW
COMMENT UA: NORMAL
GLUCOSE, URINE: NEGATIVE MG/DL
KETONES, URINE: NEGATIVE MG/DL
LEUKOCYTE ESTERASE, URINE: NEGATIVE
NITRITE URINE, QUANTITATIVE: NEGATIVE
PH, URINE: 5 (ref 5–8)
PROTEIN UA: NEGATIVE MG/DL
SPECIFIC GRAVITY UA: 1.01 (ref 1–1.03)
UROBILINOGEN, URINE: 0.2 MG/DL (ref 0.2–1)

## 2023-05-10 PROCEDURE — 94664 DEMO&/EVAL PT USE INHALER: CPT

## 2023-05-10 PROCEDURE — 81003 URINALYSIS AUTO W/O SCOPE: CPT

## 2023-05-10 PROCEDURE — 97166 OT EVAL MOD COMPLEX 45 MIN: CPT

## 2023-05-10 PROCEDURE — 97162 PT EVAL MOD COMPLEX 30 MIN: CPT

## 2023-05-10 PROCEDURE — 97116 GAIT TRAINING THERAPY: CPT

## 2023-05-10 PROCEDURE — 94150 VITAL CAPACITY TEST: CPT

## 2023-05-10 PROCEDURE — 51798 US URINE CAPACITY MEASURE: CPT

## 2023-05-10 PROCEDURE — 97535 SELF CARE MNGMENT TRAINING: CPT

## 2023-05-10 PROCEDURE — 94761 N-INVAS EAR/PLS OXIMETRY MLT: CPT

## 2023-05-10 PROCEDURE — 99232 SBSQ HOSP IP/OBS MODERATE 35: CPT | Performed by: PHYSICAL MEDICINE & REHABILITATION

## 2023-05-10 PROCEDURE — 97530 THERAPEUTIC ACTIVITIES: CPT

## 2023-05-10 PROCEDURE — 87086 URINE CULTURE/COLONY COUNT: CPT

## 2023-05-10 PROCEDURE — 6370000000 HC RX 637 (ALT 250 FOR IP): Performed by: PHYSICAL MEDICINE & REHABILITATION

## 2023-05-10 PROCEDURE — 1280000000 HC REHAB R&B

## 2023-05-10 RX ORDER — TROSPIUM CHLORIDE 20 MG/1
20 TABLET, FILM COATED ORAL NIGHTLY
Status: DISCONTINUED | OUTPATIENT
Start: 2023-05-10 | End: 2023-05-15 | Stop reason: HOSPADM

## 2023-05-10 RX ADMIN — CLOPIDOGREL BISULFATE 75 MG: 75 TABLET ORAL at 09:38

## 2023-05-10 RX ADMIN — TROSPIUM CHLORIDE 20 MG: 20 TABLET, FILM COATED ORAL at 20:44

## 2023-05-10 RX ADMIN — ACETAMINOPHEN 650 MG: 325 TABLET ORAL at 09:37

## 2023-05-10 RX ADMIN — ASPIRIN 81 MG CHEWABLE TABLET 81 MG: 81 TABLET CHEWABLE at 09:37

## 2023-05-10 RX ADMIN — DOCUSATE SODIUM 100 MG: 100 CAPSULE, LIQUID FILLED ORAL at 09:37

## 2023-05-10 RX ADMIN — AMIODARONE HYDROCHLORIDE 100 MG: 200 TABLET ORAL at 09:37

## 2023-05-10 RX ADMIN — HYDROCHLOROTHIAZIDE 25 MG: 25 TABLET ORAL at 09:38

## 2023-05-10 RX ADMIN — ATORVASTATIN CALCIUM 40 MG: 40 TABLET, FILM COATED ORAL at 20:44

## 2023-05-10 RX ADMIN — AMLODIPINE BESYLATE 5 MG: 5 TABLET ORAL at 09:37

## 2023-05-10 RX ADMIN — LISINOPRIL 20 MG: 20 TABLET ORAL at 09:37

## 2023-05-10 RX ADMIN — DOCUSATE SODIUM 100 MG: 100 CAPSULE, LIQUID FILLED ORAL at 20:44

## 2023-05-10 RX ADMIN — ACETAMINOPHEN 650 MG: 325 TABLET ORAL at 11:53

## 2023-05-10 RX ADMIN — CELECOXIB 200 MG: 200 CAPSULE ORAL at 20:44

## 2023-05-10 RX ADMIN — ACETAMINOPHEN 650 MG: 325 TABLET ORAL at 16:44

## 2023-05-10 RX ADMIN — CELECOXIB 200 MG: 200 CAPSULE ORAL at 09:37

## 2023-05-10 RX ADMIN — SENNOSIDES 8.6 MG: 8.6 TABLET, FILM COATED ORAL at 09:37

## 2023-05-10 RX ADMIN — SENNOSIDES 8.6 MG: 8.6 TABLET, FILM COATED ORAL at 20:44

## 2023-05-10 RX ADMIN — ACETAMINOPHEN 650 MG: 325 TABLET ORAL at 20:44

## 2023-05-10 ASSESSMENT — PAIN DESCRIPTION - LOCATION
LOCATION: KNEE

## 2023-05-10 ASSESSMENT — PAIN SCALES - WONG BAKER
WONGBAKER_NUMERICALRESPONSE: 0
WONGBAKER_NUMERICALRESPONSE: 2
WONGBAKER_NUMERICALRESPONSE: 0
WONGBAKER_NUMERICALRESPONSE: 4
WONGBAKER_NUMERICALRESPONSE: 0

## 2023-05-10 ASSESSMENT — PAIN DESCRIPTION - DESCRIPTORS
DESCRIPTORS: ACHING
DESCRIPTORS: ACHING
DESCRIPTORS: ACHING;THROBBING

## 2023-05-10 ASSESSMENT — PAIN SCALES - GENERAL
PAINLEVEL_OUTOF10: 0
PAINLEVEL_OUTOF10: 3
PAINLEVEL_OUTOF10: 0
PAINLEVEL_OUTOF10: 2
PAINLEVEL_OUTOF10: 0
PAINLEVEL_OUTOF10: 4

## 2023-05-10 ASSESSMENT — PAIN DESCRIPTION - ORIENTATION
ORIENTATION: RIGHT

## 2023-05-10 NOTE — PATIENT CARE CONFERENCE
family goal: discharge without hands on assist needed. PHYSICAL THERAPY (Updated in QI)  Short Term Goals  Time Frame for Short Term Goals: 7 tx days:  Short Term Goal 1: Pt will complete bed mobility (scooting, rolling R/L, and sup<->sit) Ind. Short Term Goal 2: Pt will complete OOB transfers Mod Ind. Short Term Goal 3: Pt will ambulate 10 ft over level and uneven surfaces using 2WW Mod Ind. Short Term Goal 4: Pt will ambulate >/=150 ft with turns over level surface Mod Ind. Short Term Goal 5: Pt will ascend/descend curb step using 2WW and 1 flight of stairs using railings/railing + SPC following appropriate step-to pattern on ascent/descent. Additional Goals?: Yes  Short Term Goal 6: Pt will complete object retrieval from the floor with 2WW Mod Ind. Impairments/deficits, barriers:     Body Structures, Functions, Activity Limitations Requiring Skilled Therapeutic Intervention: Decreased functional mobility , Decreased ADL status, Decreased ROM, Decreased strength, Decreased balance, Decreased high-level IADLs, Increased pain     Therapy Prognosis: Good  Decision Making: Medium Complexity  Clinical Presentation: evolving with changing characteristics  Equipment needed at discharge:      PT IRF-CIRA scores since initial assessment  Bed Mobility:   Roll Left and Right  Assistance Needed: Supervision or touching assistance  Comment: SBA in regular bed  CARE Score: 4  Discharge Goal: Independent    Sit to Lying  Assistance Needed: Supervision or touching assistance  Comment: SBA in regular bed  CARE Score: 4  Discharge Goal: Independent    Lying to Sitting on Side of Bed  Assistance Needed: Supervision or touching assistance  Comment: SBA in regular bed  CARE Score: 4  Discharge Goal: Independent    Transfers:    Sit to Stand  Assistance Needed: Supervision or touching assistance  Comment: CGA using 2WW  CARE Score: 4  Discharge Goal: Independent    Chair/Bed-to-Chair Transfer  Assistance Needed:
physician  The patient requires 24 hours rehab nursing care  The patient requires an intensive and coordinated interdisciplinary team approach to the delivery of rehabilitative care. Assessment/Plan   []  The patient is making good progression towards their long term goals and is actively participating in and has a reasonable expectation to continue to benefit from the intensive rehabilitation therapy program   []  The estimated discharge date has been changed from initial team conference due to:   []  The estimated discharge destination has been changed from initial team conference due to:         Ongoing tx following discharge: []HHC     [x]OUTPATIENT PT    [] No Further Treatment     [] Family/Caregiver Training  []  Transitional Living Arrangement   [] Home Assessment (date  )     [] Family Conference   []  Therapeutic Pass       []  Other: (specify)    Estimated Discharge Date: ***    Estimated Discharge Destination: []home alone   []home alone with assist prn  []Continuous supervision []Return home with s/o/spouse/family   [] Assisted living    []SNF     Team members participating in today's conference. [x] Karla Mcdonald, Medical Director  [x] LUCY RothT,       [] Adria Hatfield RN Nurse Manager     [x]  Harsh Matute, PT  []  Shana Bergeron, PT       [] Yoanna Reeves, OT   [x] Neha Lanier OT  [] Phani Machado OT     []  Neena Paiz, SLP    []  Jessenia Smith, KANDY   [] Chava Connolly, SLP      [x]Angela Saavedra,   [x]Cynthia Melo MSW, LSW,      [] Jc Royal RN   [] Joshua Sandra RN    [] Susanna Birmingham RN    [] Jose Robledo RN [] Albino Davis, ABBY       I have led this Team Conference and agree with the plan, Karla Mcdonald MD, 5/11/2023, 12:09 PM  Goals have been updated to reflect recent status.     Team conference note transcribed this date by: ***

## 2023-05-10 NOTE — H&P
Leidy Baez    : 1940  Acct #: [de-identified]  MRN: [id number]              History and physical  Date of face-to-face exam: 2023. Time of face-to-face exam: 1310. Admitting diagnosis: Primary osteoarthritis of the right knee (C 8.61)    Comorbid diagnoses impacting rehabilitation: Uncontrolled pain, generalized weakness, gait disturbance, acute blood loss anemia, paroxysmal atrial fibrillation, CAD, chronic diastolic congestive heart failure, essential hypertension    Chief complaint: Right leg pain with swelling. History of present illness: The patient is an 78-year-old right-hand-dominant male with a history of hypertension, CHF and A-fib. In the recent months progressive weightbearing arthritis of the right knee was limiting his safety with standing ADLs and compromising his community ambulation. He was still able to drive but struggled to get in and out of a car and to ascend the steps that he has throughout his home. Outpatient therapies and medications failed to correct his symptoms. On 2023 Dr. Bernabe Smith formed a right total knee arthroplasty. Perioperatively the patient has had significant pain, swelling and weakness. His hemoglobin is dropped and he has had some dyspnea on exertion. He denied a change in vision, speech or swallowing. Bowel movements have been infrequent. No dysuria or chills reported. During this time he has become unable to do his own toileting, transfers and self-care. Additionally he has significant stairs to navigate to return home. He requires inpatient debilitation to address these issues. Review of systems: Occasional dyspnea with exertion. No positional dizziness. Right leg pain with stiffness. Unsteady gait. .  The remainder of their review of systems was negative except as mentioned in the history of present illness. Social History: Patient is  and lives with his wife in a sick step entry [facility] home.   His bathroom is a

## 2023-05-10 NOTE — CARE COORDINATION
Case Management Admission Note      Patient:Reggie Mccormack      :1940  [facility]:[id number]  Rehab Dx/Hx: Unilateral primary osteoarthritis, right knee [M17.11]  Primary localized osteoarthritis of right knee [M17.11]    Chief Complaint:   Past Medical History:   Diagnosis Date    Arthritis     Back pain, chronic     BPH (benign prostatic hyperplasia)     CAD (coronary artery disease)     stents RCA, Cx, LAD    Cancer ([facility] [address] 2012    skin cancer removed    Family history of coronary artery disease     H/O cardiovascular stress test 17, 20    EF 50-60%. Mild anterior and septal wall ischemia. H/O Doppler ultrasound 2019    No evidence of DVT or SVT. There appears to be calcified thrombus with in the right small saphenous vein. Significant reflux noted of the Right CFV 5.0s. H/O Doppler ultrasound 2022    Mild (0-49%) disease of bilateral proximal ICA. Normal vetebral flow. H/O echocardiogram ; 2019    EF 50-55%. Diastolic Dysfunction Grade I . Sclerotic, but non-stenotic aortic valve. Aortic root dimension upper limits of normal 3.7cm.      Hyperlipidemia     Hypertension     Leg swelling 2019    PAF (paroxysmal atrial fibrillation) ([facility] [address]     Palpitation 2022    S/P angioplasty with stent 10/6/03;10/16/03;10/5/04    stent RCA; stent CX; stent LAD  &     SOB (shortness of breath) 2017    Torsade de pointes 2012     Past Surgical History:   Procedure Laterality Date    ABDOMINAL HERNIA REPAIR      CATARACT REMOVAL      CHOLECYSTECTOMY, LAPAROSCOPIC  2022    CHOLECYSTECTOMY LAPAROSCOPIC performed by Dr. Sher Stephens at [address] [place] [address], [address] N/A 2022    CHOLECYSTECTOMY LAPAROSCOPIC performed by Marquis Hunter MD at [address] [place] [address] CATH LAB PROCEDURE   10/6/03;10/16/03;10/5/04;3/12    PTCA with stents; 3-12 cont med therapy    HERNIA REPAIR Left 10/14/2022

## 2023-05-11 LAB
CULTURE: NORMAL
Lab: NORMAL
SPECIMEN: NORMAL

## 2023-05-11 PROCEDURE — 6370000000 HC RX 637 (ALT 250 FOR IP): Performed by: PHYSICAL MEDICINE & REHABILITATION

## 2023-05-11 PROCEDURE — 94761 N-INVAS EAR/PLS OXIMETRY MLT: CPT

## 2023-05-11 PROCEDURE — 97110 THERAPEUTIC EXERCISES: CPT

## 2023-05-11 PROCEDURE — 94150 VITAL CAPACITY TEST: CPT

## 2023-05-11 PROCEDURE — 97116 GAIT TRAINING THERAPY: CPT

## 2023-05-11 PROCEDURE — 97530 THERAPEUTIC ACTIVITIES: CPT

## 2023-05-11 PROCEDURE — 97535 SELF CARE MNGMENT TRAINING: CPT

## 2023-05-11 PROCEDURE — 1280000000 HC REHAB R&B

## 2023-05-11 PROCEDURE — 99232 SBSQ HOSP IP/OBS MODERATE 35: CPT | Performed by: PHYSICAL MEDICINE & REHABILITATION

## 2023-05-11 RX ORDER — PANTOPRAZOLE SODIUM 40 MG/1
40 TABLET, DELAYED RELEASE ORAL
Status: DISCONTINUED | OUTPATIENT
Start: 2023-05-11 | End: 2023-05-15 | Stop reason: HOSPADM

## 2023-05-11 RX ORDER — CALCIUM CARBONATE 200(500)MG
500 TABLET,CHEWABLE ORAL 3 TIMES DAILY PRN
Status: DISCONTINUED | OUTPATIENT
Start: 2023-05-11 | End: 2023-05-15 | Stop reason: HOSPADM

## 2023-05-11 RX ADMIN — AMIODARONE HYDROCHLORIDE 100 MG: 200 TABLET ORAL at 09:46

## 2023-05-11 RX ADMIN — ATORVASTATIN CALCIUM 40 MG: 40 TABLET, FILM COATED ORAL at 20:43

## 2023-05-11 RX ADMIN — CELECOXIB 200 MG: 200 CAPSULE ORAL at 09:46

## 2023-05-11 RX ADMIN — DOCUSATE SODIUM 100 MG: 100 CAPSULE, LIQUID FILLED ORAL at 09:46

## 2023-05-11 RX ADMIN — ACETAMINOPHEN 650 MG: 325 TABLET ORAL at 12:17

## 2023-05-11 RX ADMIN — ACETAMINOPHEN 650 MG: 325 TABLET ORAL at 17:37

## 2023-05-11 RX ADMIN — CALCIUM CARBONATE 500 MG: 500 TABLET, CHEWABLE ORAL at 20:43

## 2023-05-11 RX ADMIN — TROSPIUM CHLORIDE 20 MG: 20 TABLET, FILM COATED ORAL at 20:43

## 2023-05-11 RX ADMIN — HYDROCHLOROTHIAZIDE 25 MG: 25 TABLET ORAL at 09:46

## 2023-05-11 RX ADMIN — ACETAMINOPHEN 650 MG: 325 TABLET ORAL at 09:46

## 2023-05-11 RX ADMIN — AMLODIPINE BESYLATE 5 MG: 5 TABLET ORAL at 09:46

## 2023-05-11 RX ADMIN — CELECOXIB 200 MG: 200 CAPSULE ORAL at 20:43

## 2023-05-11 RX ADMIN — CALCIUM CARBONATE 500 MG: 500 TABLET, CHEWABLE ORAL at 16:13

## 2023-05-11 RX ADMIN — SENNOSIDES 8.6 MG: 8.6 TABLET, FILM COATED ORAL at 09:46

## 2023-05-11 RX ADMIN — ASPIRIN 81 MG CHEWABLE TABLET 81 MG: 81 TABLET CHEWABLE at 09:46

## 2023-05-11 RX ADMIN — CLOPIDOGREL BISULFATE 75 MG: 75 TABLET ORAL at 09:46

## 2023-05-11 RX ADMIN — LISINOPRIL 20 MG: 20 TABLET ORAL at 09:46

## 2023-05-11 RX ADMIN — ACETAMINOPHEN 650 MG: 325 TABLET ORAL at 20:43

## 2023-05-11 ASSESSMENT — PAIN DESCRIPTION - ONSET: ONSET: GRADUAL

## 2023-05-11 ASSESSMENT — PAIN - FUNCTIONAL ASSESSMENT: PAIN_FUNCTIONAL_ASSESSMENT: PREVENTS OR INTERFERES SOME ACTIVE ACTIVITIES AND ADLS

## 2023-05-11 ASSESSMENT — PAIN DESCRIPTION - LOCATION: LOCATION: KNEE

## 2023-05-11 ASSESSMENT — PAIN SCALES - GENERAL
PAINLEVEL_OUTOF10: 2
PAINLEVEL_OUTOF10: 0
PAINLEVEL_OUTOF10: 1

## 2023-05-11 ASSESSMENT — PAIN DESCRIPTION - DESCRIPTORS: DESCRIPTORS: ACHING

## 2023-05-11 ASSESSMENT — PAIN DESCRIPTION - PAIN TYPE: TYPE: SURGICAL PAIN

## 2023-05-11 ASSESSMENT — PAIN DESCRIPTION - ORIENTATION: ORIENTATION: RIGHT

## 2023-05-11 ASSESSMENT — PAIN DESCRIPTION - FREQUENCY: FREQUENCY: INTERMITTENT

## 2023-05-11 NOTE — CARE COORDINATION
LSW met with patient and spouse and provided written communication following Care Conference. LSW informed patient of recommendations for 2WW, [place], Outpatient PT. Provided resources for [facility] and area Outpatient facilities. Patient verbalized understanding and chose [facility] in [place], a referral will be made closer to discharge. Whiteboard updated. D/C Plan:  Estimated Date: May 15  DME:  2WW (Agency TBD)  Outpatient:  PT ([address])  To:   Home with Spouse (family will transport)

## 2023-05-12 PROCEDURE — 6370000000 HC RX 637 (ALT 250 FOR IP): Performed by: PHYSICAL MEDICINE & REHABILITATION

## 2023-05-12 PROCEDURE — 94150 VITAL CAPACITY TEST: CPT

## 2023-05-12 PROCEDURE — 97116 GAIT TRAINING THERAPY: CPT

## 2023-05-12 PROCEDURE — 97530 THERAPEUTIC ACTIVITIES: CPT

## 2023-05-12 PROCEDURE — 99232 SBSQ HOSP IP/OBS MODERATE 35: CPT | Performed by: PHYSICAL MEDICINE & REHABILITATION

## 2023-05-12 PROCEDURE — 97110 THERAPEUTIC EXERCISES: CPT

## 2023-05-12 PROCEDURE — 94664 DEMO&/EVAL PT USE INHALER: CPT

## 2023-05-12 PROCEDURE — 94761 N-INVAS EAR/PLS OXIMETRY MLT: CPT

## 2023-05-12 PROCEDURE — 1280000000 HC REHAB R&B

## 2023-05-12 RX ADMIN — LISINOPRIL 20 MG: 20 TABLET ORAL at 08:47

## 2023-05-12 RX ADMIN — PANTOPRAZOLE SODIUM 40 MG: 40 TABLET, DELAYED RELEASE ORAL at 05:44

## 2023-05-12 RX ADMIN — AMIODARONE HYDROCHLORIDE 100 MG: 200 TABLET ORAL at 08:46

## 2023-05-12 RX ADMIN — ACETAMINOPHEN 650 MG: 325 TABLET ORAL at 17:09

## 2023-05-12 RX ADMIN — ACETAMINOPHEN 650 MG: 325 TABLET ORAL at 08:46

## 2023-05-12 RX ADMIN — AMLODIPINE BESYLATE 5 MG: 5 TABLET ORAL at 08:47

## 2023-05-12 RX ADMIN — HYDROCHLOROTHIAZIDE 25 MG: 25 TABLET ORAL at 08:46

## 2023-05-12 RX ADMIN — ACETAMINOPHEN 650 MG: 325 TABLET ORAL at 20:42

## 2023-05-12 RX ADMIN — ASPIRIN 81 MG CHEWABLE TABLET 81 MG: 81 TABLET CHEWABLE at 08:46

## 2023-05-12 RX ADMIN — CELECOXIB 200 MG: 200 CAPSULE ORAL at 08:46

## 2023-05-12 RX ADMIN — ATORVASTATIN CALCIUM 40 MG: 40 TABLET, FILM COATED ORAL at 20:41

## 2023-05-12 RX ADMIN — CLOPIDOGREL BISULFATE 75 MG: 75 TABLET ORAL at 08:47

## 2023-05-12 RX ADMIN — TROSPIUM CHLORIDE 20 MG: 20 TABLET, FILM COATED ORAL at 20:42

## 2023-05-12 RX ADMIN — ACETAMINOPHEN 650 MG: 325 TABLET ORAL at 12:15

## 2023-05-12 RX ADMIN — CELECOXIB 200 MG: 200 CAPSULE ORAL at 20:42

## 2023-05-12 ASSESSMENT — PAIN SCALES - GENERAL
PAINLEVEL_OUTOF10: 2
PAINLEVEL_OUTOF10: 0
PAINLEVEL_OUTOF10: 0

## 2023-05-12 ASSESSMENT — PAIN DESCRIPTION - FREQUENCY: FREQUENCY: INTERMITTENT

## 2023-05-12 ASSESSMENT — PAIN DESCRIPTION - LOCATION: LOCATION: KNEE

## 2023-05-12 ASSESSMENT — PAIN - FUNCTIONAL ASSESSMENT: PAIN_FUNCTIONAL_ASSESSMENT: ACTIVITIES ARE NOT PREVENTED

## 2023-05-12 ASSESSMENT — PAIN DESCRIPTION - ORIENTATION: ORIENTATION: RIGHT

## 2023-05-12 ASSESSMENT — PAIN DESCRIPTION - PAIN TYPE: TYPE: SURGICAL PAIN

## 2023-05-12 ASSESSMENT — PAIN DESCRIPTION - ONSET: ONSET: ON-GOING

## 2023-05-12 ASSESSMENT — PAIN DESCRIPTION - DESCRIPTORS: DESCRIPTORS: ACHING

## 2023-05-12 NOTE — DISCHARGE INSTR - ACTIVITY
Pt is discharging to home with [facility]  [address] [facility] [address]  [phone]  A representative from [facility] will contact you at home to schedule your home care needs    Pt [facility]   [facility] [place], [address] [phone]

## 2023-05-12 NOTE — DISCHARGE INSTRUCTIONS
Your information:  Name: Naomi Beasley  : 1940    Your instructions:    ***    Pt is discharging to home with [facility]  [address] [place], [place], [address]  [phone]  A representative from [facility] will contact you at home to schedule your home care needs    Pt will discharge home with medical supplies from   [facility] [phone]    What to do after you leave the hospital:    Recommended diet: {diet:15164}    Recommended activity: {discharge activity:14659}        The following personal items were collected during your admission and were returned to you:    Belongings  Dental Appliances: None  Vision - Corrective Lenses: Eyeglasses  Hearing Aid: None  Clothing: Pants, Footwear, Shirt, Socks, Undergarments  Jewelry: None  Body Piercings Removed: No  Electronic Devices: Other (Comment), Tablet  Weapons (Notify Protective Services/Security): None  Other Valuables: Other (Comment) (n/a)  Home Medications: None  Valuables Given To: Other (Comment)  Provide Name(s) of Who Valuable(s) Were Given To: n/a  Responsible person(s) in the waiting room: n/a  Patient approves for provider to speak to responsible person post operatively:  (n/a)    Information obtained by:  By signing below, I understand that if any problems occur once I leave the hospital I am to contact ***. I understand and acknowledge receipt of the instructions indicated above.

## 2023-05-12 NOTE — CARE COORDINATION
Faxed order for 2WW to [facility]. Faxed order for Outpatient PT to [facility].

## 2023-05-13 PROCEDURE — 97116 GAIT TRAINING THERAPY: CPT

## 2023-05-13 PROCEDURE — 97110 THERAPEUTIC EXERCISES: CPT

## 2023-05-13 PROCEDURE — 97530 THERAPEUTIC ACTIVITIES: CPT

## 2023-05-13 PROCEDURE — 94150 VITAL CAPACITY TEST: CPT

## 2023-05-13 PROCEDURE — 97535 SELF CARE MNGMENT TRAINING: CPT

## 2023-05-13 PROCEDURE — 6370000000 HC RX 637 (ALT 250 FOR IP): Performed by: PHYSICAL MEDICINE & REHABILITATION

## 2023-05-13 PROCEDURE — 94761 N-INVAS EAR/PLS OXIMETRY MLT: CPT

## 2023-05-13 PROCEDURE — 1280000000 HC REHAB R&B

## 2023-05-13 RX ADMIN — CELECOXIB 200 MG: 200 CAPSULE ORAL at 20:38

## 2023-05-13 RX ADMIN — DOCUSATE SODIUM 100 MG: 100 CAPSULE, LIQUID FILLED ORAL at 20:38

## 2023-05-13 RX ADMIN — CLOPIDOGREL BISULFATE 75 MG: 75 TABLET ORAL at 10:37

## 2023-05-13 RX ADMIN — AMLODIPINE BESYLATE 5 MG: 5 TABLET ORAL at 10:36

## 2023-05-13 RX ADMIN — ACETAMINOPHEN 650 MG: 325 TABLET ORAL at 17:36

## 2023-05-13 RX ADMIN — TROSPIUM CHLORIDE 20 MG: 20 TABLET, FILM COATED ORAL at 20:38

## 2023-05-13 RX ADMIN — HYDROCHLOROTHIAZIDE 25 MG: 25 TABLET ORAL at 10:36

## 2023-05-13 RX ADMIN — LISINOPRIL 20 MG: 20 TABLET ORAL at 10:36

## 2023-05-13 RX ADMIN — ACETAMINOPHEN 650 MG: 325 TABLET ORAL at 20:38

## 2023-05-13 RX ADMIN — ATORVASTATIN CALCIUM 40 MG: 40 TABLET, FILM COATED ORAL at 20:38

## 2023-05-13 RX ADMIN — AMIODARONE HYDROCHLORIDE 100 MG: 200 TABLET ORAL at 10:35

## 2023-05-13 RX ADMIN — ASPIRIN 81 MG CHEWABLE TABLET 81 MG: 81 TABLET CHEWABLE at 10:36

## 2023-05-13 RX ADMIN — PANTOPRAZOLE SODIUM 40 MG: 40 TABLET, DELAYED RELEASE ORAL at 05:02

## 2023-05-13 RX ADMIN — ACETAMINOPHEN 650 MG: 325 TABLET ORAL at 09:00

## 2023-05-13 RX ADMIN — SENNOSIDES 8.6 MG: 8.6 TABLET, FILM COATED ORAL at 10:37

## 2023-05-13 RX ADMIN — CELECOXIB 200 MG: 200 CAPSULE ORAL at 10:37

## 2023-05-13 RX ADMIN — SENNOSIDES 8.6 MG: 8.6 TABLET, FILM COATED ORAL at 20:38

## 2023-05-13 RX ADMIN — DOCUSATE SODIUM 100 MG: 100 CAPSULE, LIQUID FILLED ORAL at 10:36

## 2023-05-13 ASSESSMENT — PAIN DESCRIPTION - ORIENTATION
ORIENTATION: RIGHT
ORIENTATION: RIGHT

## 2023-05-13 ASSESSMENT — PAIN SCALES - GENERAL: PAINLEVEL_OUTOF10: 0

## 2023-05-13 ASSESSMENT — PAIN DESCRIPTION - LOCATION
LOCATION: KNEE
LOCATION: KNEE

## 2023-05-13 ASSESSMENT — PAIN DESCRIPTION - DESCRIPTORS: DESCRIPTORS: DISCOMFORT

## 2023-05-13 NOTE — PLAN OF CARE
Problem: Discharge Planning  Goal: Discharge to home or other facility with appropriate resources  5/10/2023 2248 by Nick Sharp RN  Outcome: Progressing  5/10/2023 1136 by Max Nunez  Outcome: Progressing     Problem: Pain  Goal: Verbalizes/displays adequate comfort level or baseline comfort level  5/10/2023 2248 by Nick Sharp RN  Outcome: Progressing  5/10/2023 1136 by Max Nunez  Outcome: Progressing     Problem: ABCDS Injury Assessment  Goal: Absence of physical injury  5/10/2023 2248 by Nick Sharp RN  Outcome: Progressing  5/10/2023 1136 by Max Nunez  Outcome: Progressing
Problem: Discharge Planning  Goal: Discharge to home or other facility with appropriate resources  5/11/2023 1053 by Ingrid Araujo LPN  Outcome: Progressing  5/10/2023 2248 by Zack Llanes RN  Outcome: Progressing     Problem: Pain  Goal: Verbalizes/displays adequate comfort level or baseline comfort level  5/11/2023 1053 by Ingrid Araujo LPN  Outcome: Progressing  5/10/2023 2248 by Zack Llanes RN  Outcome: Progressing     Problem: ABCDS Injury Assessment  Goal: Absence of physical injury  5/11/2023 1053 by Ingrid Araujo LPN  Outcome: Progressing  5/10/2023 2248 by Zack Llanes RN  Outcome: Progressing     Problem: Safety - Adult  Goal: Free from fall injury  Outcome: Progressing
Problem: Discharge Planning  Goal: Discharge to home or other facility with appropriate resources  5/12/2023 0946 by Stuart Burnette RN  Outcome: Progressing  5/11/2023 2303 by Dennis Hedrick RN  Outcome: Progressing     Problem: Pain  Goal: Verbalizes/displays adequate comfort level or baseline comfort level  5/12/2023 0946 by Stuart Burnette RN  Outcome: Progressing  5/11/2023 2303 by Dennis Hedrick RN  Outcome: Progressing     Problem: ABCDS Injury Assessment  Goal: Absence of physical injury  5/12/2023 0946 by Stuart Burnette RN  Outcome: Progressing  5/11/2023 2303 by Dennis Hedrick RN  Outcome: Progressing     Problem: Safety - Adult  Goal: Free from fall injury  5/12/2023 0946 by Stuart Burnette RN  Outcome: Progressing  5/11/2023 2303 by Dennis Hedrick RN  Outcome: Progressing     Problem: Nutrition Deficit:  Goal: Optimize nutritional status  5/12/2023 0946 by Stuart Burnette RN  Outcome: Progressing  5/11/2023 2303 by Dennis Hedrick RN  Outcome: Progressing
Problem: Discharge Planning  Goal: Discharge to home or other facility with appropriate resources  5/12/2023 2228 by Yuliana Smith RN  Outcome: Progressing  5/12/2023 0946 by Everett Shields RN  Outcome: Progressing     Problem: Pain  Goal: Verbalizes/displays adequate comfort level or baseline comfort level  5/12/2023 2228 by Yuliana Smith RN  Outcome: Progressing  5/12/2023 0946 by Everett Shields RN  Outcome: Progressing     Problem: ABCDS Injury Assessment  Goal: Absence of physical injury  5/12/2023 2228 by Yuliana Smith RN  Outcome: Progressing  5/12/2023 0946 by Everett Shields RN  Outcome: Progressing     Problem: Safety - Adult  Goal: Free from fall injury  5/12/2023 2228 by Yuliana Smith RN  Outcome: Progressing  5/12/2023 0946 by Everett Shields RN  Outcome: Progressing     Problem: Nutrition Deficit:  Goal: Optimize nutritional status  5/12/2023 2228 by Yuliana Smith RN  Outcome: Progressing  5/12/2023 0946 by Everett Shields RN  Outcome: Progressing
Problem: Discharge Planning  Goal: Discharge to home or other facility with appropriate resources  Outcome: Progressing     Problem: Discharge Planning  Goal: Discharge to home or other facility with appropriate resources  Outcome: Progressing
monitoring his knee incision for signs of infection. Strength and range of motion recovery will be emphasized. DVT prophylaxis, caregiver training and bowel and bladder monitoring. Outpatient follow-up with orthopedics in 2 weeks. DVT prophylaxis: His surgeon has chosen a baby aspirin daily for DVT prophylaxis. Weightbearing activities will be pursued daily. SCDs when in bed. Uncontrolled pain: I will schedule his acetaminophen 4 times daily. He has oxycodone and Celebrex available as well. Cryotherapy, progressive mobilization and limb elevation. Bowel intervention while on the narcotics. Paroxysmal atrial fibrillation: Cordarone for rate control. He does not require DOAC. Vi  erin signs are checked at rest and with activity. Monitoring daily weights to detect any decompensation of his CHF. CAD: The patient requires antiplatelet therapy with Plavix and a baby aspirin as well as a statin. Blood pressure control will be emphasized. Outpatient follow-up with his PCP. Chronic diastolic congestive heart failure: Daily weights to detect any decompensation. Diuretic therapy with hydrochlorothiazide (substituted for indapamide). Cautious use of Celebrex. Blood pressure control and an ACE inhibitor. Hypertension: The patient requires Norvasc, hydrochlorothiazide and lisinopril for blood pressure control. Pain management will also be useful. Target systolic blood pressures 097-816. Vital signs are checked at rest and with activity. Encouraging consistent oral hydration. Anticipated discharge destination:    [] Home Independently   [x] Home with supervision    []SNF     [] Other       This plan has been reviewed with me in a language I understand.  I have had the opportunity to include my input with my therapy team.    ________________________________________________   ______________________  Patient/Significant Other      Date    I have reviewed this initial plan of care and agree with its

## 2023-05-13 NOTE — FLOWSHEET NOTE
[x] daily progress note       [] discharge       Patient Name:  Amberly Montelongo   :  1940 MRN: [id number]  Room:  [address] [place] [address] Date of Admission: 2023  Rehabilitation Diagnosis:   Unilateral primary osteoarthritis, right knee [M17.11]  Primary localized osteoarthritis of right knee [M17.11]       Date 2023       Day of ARU Week:  5   Time IN/OUT 5643-5740  [id number]   Individual Tx Minutes 120   TOTAL Tx Time Mins 120   Restrictions Restrictions/Precautions  Restrictions/Precautions: Fall Risk, General Precautions, Weight Bearing (WBAT RLE)      Communication with other providers: [x]   OK to see per nursing:     []   Spoke with team member regarding:      Subjective observations and cognitive status: AM session: pt seen sitting up in recliner at beginning of treatment. Agreeable to therapy. PM session: pt seen sitting up in recliner at beginning of treatment. Agreeable to therapy. Pain level/location: 0/10 at rest    Location: right knee   Discharge recommendations  Anticipated discharge date:  05/15/2023  Destination: []home alone   []home alone with assist PRN     [x] home w/ family      [] Continuous supervision  []SNF    [] Assisted living     [] Other:  Continued therapy: []HHC PT  [x]OUTPATIENT PT   [] No Further PT  []SNF PT  Caregiver training recommended: []Yes  [x] No   Equipment needs: 2ww  Amberly Montelongo requires the assistance of a front-wheeled walker to successfully ambulate from room to room at home to allow completion of daily living tasks such as: bathing, toileting, dressing and grooming. A wheeled walker is necessary due to the patient's unsteady gait, upper body weakness, inability to  a standard walker. This patient can ambulate only by pushing a walker instead of using a lesser assistive device such as a cane or crutch.       [x]   Pt received out of bed     Transfers:    Sit--> Stand:  mod I   Stand --> Sit:   mod I   Chair-->Bed/Bed --> Chair:   mod I

## 2023-05-14 VITALS
HEIGHT: 70 IN | SYSTOLIC BLOOD PRESSURE: 143 MMHG | WEIGHT: 179.23 LBS | TEMPERATURE: 97.9 F | DIASTOLIC BLOOD PRESSURE: 66 MMHG | OXYGEN SATURATION: 96 % | HEART RATE: 64 BPM | RESPIRATION RATE: 18 BRPM | BODY MASS INDEX: 25.66 KG/M2

## 2023-05-14 PROCEDURE — 6370000000 HC RX 637 (ALT 250 FOR IP): Performed by: PHYSICAL MEDICINE & REHABILITATION

## 2023-05-14 PROCEDURE — 94150 VITAL CAPACITY TEST: CPT

## 2023-05-14 PROCEDURE — 1280000000 HC REHAB R&B

## 2023-05-14 PROCEDURE — 94761 N-INVAS EAR/PLS OXIMETRY MLT: CPT

## 2023-05-14 RX ADMIN — ACETAMINOPHEN 650 MG: 325 TABLET ORAL at 20:12

## 2023-05-14 RX ADMIN — ATORVASTATIN CALCIUM 40 MG: 40 TABLET, FILM COATED ORAL at 22:53

## 2023-05-14 RX ADMIN — CELECOXIB 200 MG: 200 CAPSULE ORAL at 20:13

## 2023-05-14 RX ADMIN — AMIODARONE HYDROCHLORIDE 100 MG: 200 TABLET ORAL at 10:26

## 2023-05-14 RX ADMIN — PANTOPRAZOLE SODIUM 40 MG: 40 TABLET, DELAYED RELEASE ORAL at 06:01

## 2023-05-14 RX ADMIN — ACETAMINOPHEN 650 MG: 325 TABLET ORAL at 10:24

## 2023-05-14 RX ADMIN — CELECOXIB 200 MG: 200 CAPSULE ORAL at 10:26

## 2023-05-14 RX ADMIN — HYDROCHLOROTHIAZIDE 25 MG: 25 TABLET ORAL at 10:23

## 2023-05-14 RX ADMIN — AMLODIPINE BESYLATE 5 MG: 5 TABLET ORAL at 10:26

## 2023-05-14 RX ADMIN — TROSPIUM CHLORIDE 20 MG: 20 TABLET, FILM COATED ORAL at 20:12

## 2023-05-14 RX ADMIN — DOCUSATE SODIUM 100 MG: 100 CAPSULE, LIQUID FILLED ORAL at 20:13

## 2023-05-14 RX ADMIN — ASPIRIN 81 MG CHEWABLE TABLET 81 MG: 81 TABLET CHEWABLE at 10:27

## 2023-05-14 RX ADMIN — LISINOPRIL 20 MG: 20 TABLET ORAL at 10:28

## 2023-05-14 RX ADMIN — CLOPIDOGREL BISULFATE 75 MG: 75 TABLET ORAL at 10:26

## 2023-05-14 ASSESSMENT — PAIN SCALES - WONG BAKER
WONGBAKER_NUMERICALRESPONSE: 0
WONGBAKER_NUMERICALRESPONSE: 0

## 2023-05-14 ASSESSMENT — PAIN SCALES - GENERAL
PAINLEVEL_OUTOF10: 0
PAINLEVEL_OUTOF10: 0
PAINLEVEL_OUTOF10: 2

## 2023-05-14 ASSESSMENT — PAIN DESCRIPTION - LOCATION: LOCATION: KNEE

## 2023-05-14 ASSESSMENT — PAIN DESCRIPTION - ORIENTATION: ORIENTATION: RIGHT

## 2023-05-14 ASSESSMENT — PAIN - FUNCTIONAL ASSESSMENT: PAIN_FUNCTIONAL_ASSESSMENT: ACTIVITIES ARE NOT PREVENTED

## 2023-05-14 ASSESSMENT — PAIN DESCRIPTION - DESCRIPTORS: DESCRIPTORS: ACHING

## 2023-05-15 VITALS
TEMPERATURE: 97.5 F | HEIGHT: 70 IN | RESPIRATION RATE: 17 BRPM | DIASTOLIC BLOOD PRESSURE: 66 MMHG | SYSTOLIC BLOOD PRESSURE: 130 MMHG | HEART RATE: 70 BPM | WEIGHT: 181.22 LBS | BODY MASS INDEX: 25.94 KG/M2 | OXYGEN SATURATION: 94 %

## 2023-05-15 PROCEDURE — 97110 THERAPEUTIC EXERCISES: CPT

## 2023-05-15 PROCEDURE — 6370000000 HC RX 637 (ALT 250 FOR IP): Performed by: PHYSICAL MEDICINE & REHABILITATION

## 2023-05-15 PROCEDURE — 97116 GAIT TRAINING THERAPY: CPT

## 2023-05-15 PROCEDURE — 97530 THERAPEUTIC ACTIVITIES: CPT

## 2023-05-15 PROCEDURE — 99239 HOSP IP/OBS DSCHRG MGMT >30: CPT | Performed by: PHYSICAL MEDICINE & REHABILITATION

## 2023-05-15 PROCEDURE — 97535 SELF CARE MNGMENT TRAINING: CPT

## 2023-05-15 RX ORDER — PSEUDOEPHEDRINE HCL 30 MG
100 TABLET ORAL 2 TIMES DAILY PRN
COMMUNITY
Start: 2023-05-15

## 2023-05-15 RX ORDER — SENNA PLUS 8.6 MG/1
1 TABLET ORAL DAILY PRN
Qty: 60 TABLET | Refills: 0 | COMMUNITY
Start: 2023-05-15 | End: 2023-06-14

## 2023-05-15 RX ORDER — ASPIRIN 81 MG/1
81 TABLET, CHEWABLE ORAL DAILY
Qty: 30 TABLET | Refills: 3 | COMMUNITY
Start: 2023-05-16

## 2023-05-15 RX ORDER — TROSPIUM CHLORIDE 20 MG/1
20 TABLET, FILM COATED ORAL NIGHTLY
Qty: 30 TABLET | Refills: 0 | Status: SHIPPED | OUTPATIENT
Start: 2023-05-15

## 2023-05-15 RX ADMIN — CELECOXIB 200 MG: 200 CAPSULE ORAL at 09:39

## 2023-05-15 RX ADMIN — ACETAMINOPHEN 650 MG: 325 TABLET ORAL at 09:39

## 2023-05-15 RX ADMIN — HYDROCHLOROTHIAZIDE 25 MG: 25 TABLET ORAL at 09:40

## 2023-05-15 RX ADMIN — PANTOPRAZOLE SODIUM 40 MG: 40 TABLET, DELAYED RELEASE ORAL at 05:46

## 2023-05-15 RX ADMIN — AMLODIPINE BESYLATE 5 MG: 5 TABLET ORAL at 09:39

## 2023-05-15 RX ADMIN — LISINOPRIL 20 MG: 20 TABLET ORAL at 09:40

## 2023-05-15 RX ADMIN — SENNOSIDES 8.6 MG: 8.6 TABLET, FILM COATED ORAL at 09:39

## 2023-05-15 RX ADMIN — ACETAMINOPHEN 650 MG: 325 TABLET ORAL at 12:54

## 2023-05-15 RX ADMIN — AMIODARONE HYDROCHLORIDE 100 MG: 200 TABLET ORAL at 09:39

## 2023-05-15 RX ADMIN — ASPIRIN 81 MG CHEWABLE TABLET 81 MG: 81 TABLET CHEWABLE at 09:39

## 2023-05-15 RX ADMIN — DOCUSATE SODIUM 100 MG: 100 CAPSULE, LIQUID FILLED ORAL at 09:39

## 2023-05-15 RX ADMIN — CLOPIDOGREL BISULFATE 75 MG: 75 TABLET ORAL at 09:39

## 2023-05-15 ASSESSMENT — PAIN DESCRIPTION - ORIENTATION
ORIENTATION: RIGHT

## 2023-05-15 ASSESSMENT — PAIN SCALES - GENERAL
PAINLEVEL_OUTOF10: 0

## 2023-05-15 ASSESSMENT — PAIN SCALES - WONG BAKER
WONGBAKER_NUMERICALRESPONSE: 0

## 2023-05-15 ASSESSMENT — PAIN DESCRIPTION - LOCATION
LOCATION: KNEE

## 2023-05-15 ASSESSMENT — PAIN DESCRIPTION - DESCRIPTORS
DESCRIPTORS: ACHING
DESCRIPTORS: ACHING

## 2023-05-15 NOTE — CARE COORDINATION
PCP office declined to schedule follow up. Case mgt met with patient in room. He's looking forward to dc home today. He's already aware of his follow up with Dr Ray Campbell. His spouse and daughter will provide dc transport later today. RW hasn't been delivered yet. Case t will check back in an hour.           ARU  Discharge Summary    D/C Date: 5/15/23    Patient discharged to: home with spouse    Transported by: family    Referrals made to: [facility], [address] [place] [address] outpatient    Additional information:     Caregiver training: none requested    Discharge BIMS completed:  [x]

## 2023-05-16 NOTE — DISCHARGE SUMMARY
Patient Name: Armida Turner  Patient :  1940  Patient MRN:   [id number]      Admission Date:  2023  Discharge Date: 5/15/2023    Admitting diagnosis: Primary osteoarthritis of the right knee ( [address])     Comorbid diagnoses impacting rehabilitation: Uncontrolled pain, generalized weakness, gait disturbance, acute blood loss anemia, paroxysmal atrial fibrillation, CAD, chronic diastolic congestive heart failure, essential hypertension    Discharging diagnosis: Primary osteoarthritis of the right knee ( [address])     Comorbid diagnoses impacting rehabilitation: Uncontrolled pain, generalized weakness, gait disturbance, acute blood loss anemia, paroxysmal atrial fibrillation, CAD, chronic diastolic congestive heart failure, essential hypertension     History of present illness: The patient is an 49-year-old right-hand-dominant male with a history of hypertension, CHF and A-fib. In the recent months progressive weightbearing arthritis of the right knee was limiting his safety with standing ADLs and compromising his community ambulation. He was still able to drive but struggled to get in and out of a car and to ascend the steps that he has throughout his home. Outpatient therapies and medications failed to correct his symptoms. On 2023 Dr. Alek Castillo formed a right total knee arthroplasty. Perioperatively the patient had significant pain, swelling and weakness. His hemoglobin dropped and he had some dyspnea on exertion. He denied a change in vision, speech or swallowing. Bowel movements were infrequent. No dysuria or chills reported. Prior (baseline) level of function: Independent. Current level of function:         Current  IRF-CIRA and Goals:   Occupational Therapy:    Short Term Goals  Time Frame for Short Term Goals: STGs=LTGs :   Long Term Goals  Time Frame for Long Term Goals : 7 days or until d/c. Long Term Goal 1: Pt will complete oral hygiene and grooming tasks with IND.   Long

## 2023-05-16 NOTE — PROGRESS NOTES
05/10/23 1500   Encounter Summary   Encounter Overview/Reason  Initial Encounter   Service Provided For: Patient and family together   Referral/Consult From: [facility]   Support System Spouse   Last Encounter  05/10/23  (Pleasant introductory visit with patient. He had communion with Kasey Patel earlier today. He has no needs at the present time. Blessings given.)   Complexity of Encounter Low   Begin Time 0257   End Time  0302   Total Time Calculated 5 min   Encounter    Type Initial Screen/Assessment   Spiritual/Emotional needs   Type Spiritual Support   Assessment/Intervention/Outcome   Assessment Calm;Coping; Hopeful   Intervention Active listening;Nurtured Hope;Prayer (assurance of)/[place];Sustaining Presence/Ministry of presence   Outcome Acceptance; Coping;Engaged in conversation;Encouraged;Expressed Gratitude;Peace   Plan and Referrals   Plan/Referrals Continue Support (comment)
05/16/23 0800   Encounter Summary   Encounter Overview/Reason  Volunteer Encounter   Service Provided For: Patient   Referral/Consult From: [facility] [address] Encounter  05/15/23  (Volunteer visit by Darlene Rodriguez, [facility] ; documented by Maurilio Knight)   Complexity of Encounter Low   Begin Time 0915   End Time  0920   Total Time Calculated 5 min   Encounter    Type Follow up   Spiritual/Emotional needs   Type Spiritual Support   Rituals, Rites and Sacraments   Type Episcopal Communion   Assessment/Intervention/Outcome   Assessment Calm   Intervention Active listening   Outcome Acceptance;Comfort   Plan and Referrals   Plan/Referrals Continue to visit, (comment)
4 Eyes Skin Assessment     NAME:  Amberly Montelongo  YOB: 1940  MEDICAL RECORD NUMBER:  [id number]    The patient is being assessed for  Admission    I agree that at least one RN has performed a thorough Head to Toe Skin Assessment on the patient. ALL assessment sites listed below have been assessed. Areas assessed by both nurses:    Head, Face, Ears, Shoulders, Back, Chest, Arms, Elbows, Hands, Sacrum. Buttock, Coccyx, Ischium, and Legs. Feet and Heels        Does the Patient have a Wound?  No noted wound(s)       Patrick Prevention initiated by RN: No  Wound Care Orders initiated by RN: No    Pressure Injury (Stage 3,4, Unstageable, DTI, NWPT, and Complex wounds) if present, place Wound referral order by RN under : No    New Ostomies, if present place, Ostomy referral order under : No     Nurse 1 eSignature: Electronically signed by William Griffith RN on 5/9/23 at 2:56 PM EDT    **SHARE this note so that the co-signing nurse can place an eSignature**    Nurse 2 eSignature: Electronically signed by Kavya Hernandez LPN on 7/29/31 at 36:22 AM EDT
ARU Admission Assessment - [facility]      A Complete drug regimen review was completed for this patient this date. [x]  No clinically significant medication issue was identified   []  Yes, a clinically significant medication issue was identified     []  Adverse Drug Event:    []  Allergy:    []  Side Effect:    []  Ineffective Therapy:    []  Drug interaction:     []  Duplicate Therapy:    []  Untreated Indication:    []  Non-adherence:    []  Other:  Nursing contacted the physician:       Date:                Time:    Actions recommended by physician were completed:   Date:                 Time:  Action(s) Taken:             []  New Physician Order Received    []  Issue Noted by Physician; However No Action Required    []  Other:        Ethnicity  \"Are you of , /a, or [place] origin? \"  Check all that apply:  [x] A. No, not of , /a, or [place] Origin  [] B.  Yes, [place], [place] American, Chicano/a  [] C.  Yes, [address] [place] [address]  [] D.  Yes, [place]  [] E.  Yes, another , , or [place] origin  [] X. Patient unable to respond    Race  \"What is your race? \"  Check all that apply:  [x] A. White  [] B. Black or   [] C. American [place] [facility] or [place] Native  [] D.  [place] [facility]  [] E. [place]  [] F. Hungarian  [] G. [place]  [] Niki Hernandez  [] IAnand Jose  [] J.  Other   [] K.   [] L. Azerbaijani or Ezekiel  [] M. Bol[place]ian  [] N. Other [facility]  [] X. Patient unable to respond    Language  A. \"What is your preferred language? \" Lakeshia Lama. \"Do you need or want an  to communicate with a doctor or health care staff? \"  Check only one:  [x] 0. No  [] 1. Yes  [] 9. Unable to determine    Transportation  \"In the past 6-12 months, has lack of transportation kept you from medical appointments, meetings, work, or from getting things needed for daily living? \"  Check all that apply:  [x] A.  Yes, it has kept me from
ARU Discharge Assessment - [facility]    Transportation  \"In the past 6-12 months, has lack of transportation kept you from medical appointments, meetings, work, or from getting things needed for daily living? \"Check all that apply:  [] A.  Yes, it has kept me from medical appointments or from getting my medications  [] B.  Yes, it has kept me from non-medical meetings, appointments, work, or from getting things that I need  [x] C.  No  [] X. Patient unable to respond    Provision of Current Reconciled Medication List to Subsequent Provider at Discharge     [x] No, current reconciled medication list not provided to the subsequent provider.  [] Yes, current reconciled medication list provided to the subsequent provider. YES if D/C to Acute hospital, SNF, home with home health  (**We MUST Select route of transmission below**)      [] Via Electronic Health Record   [] [employer]  [] Verbal (e.g. in person, telephone, video conferencing)  [] Paper-based (e.g. fax, copies, printouts)   [] Other Methods (e.g. texting, email, CDs)    Provision of Current Reconciled Medication List to Patient at Discharge  [] No, current reconciled medication list not provided to the patient, family and/or caregiver. NO If D/C to Acute hospital, SNF, home with home health   [x] Yes, current reconciled medication list provided to the patient, family and/or caregiver.   YES if D/C home with Outpatient or no services   (**WE MUST Select route of transmission below**)     [] Via Electronic Health Record (e.g., electronic access to patient portal)   [] Via Health Information Exchange Organization  [x] Verbal (e.g. in person, telephone, video conferencing)  [x] Paper-based (e.g. fax, copies, printouts)   [] Other Methods (e.g. texting, email, CDs)    Health Literacy  \"How often do you need to have someone help you when you read instructions, pamphlets, or other written material from your doctor or
Anny Zayas    : 1940  Acct #: [de-identified]  MRN: [id number]              PM&R Progress Note      Admitting diagnosis: Primary osteoarthritis of the right knee ( [address] 8.61)     Comorbid diagnoses impacting rehabilitation: Uncontrolled pain, generalized weakness, gait disturbance, acute blood loss anemia, paroxysmal atrial fibrillation, CAD, chronic diastolic congestive heart failure, essential hypertension    Chief complaint: Urinary frequency without pain or fever. Prior (baseline) level of function: Independent. Current level of function:         Current  IRF-CIRA and Goals:   Occupational Therapy:    Short Term Goals  Time Frame for Short Term Goals: STGs=LTGs :   Long Term Goals  Time Frame for Long Term Goals : 7 days or until d/c. Long Term Goal 1: Pt will complete oral hygiene and grooming tasks with IND. Long Term Goal 2: Pt will complete total body bathing with AE PRN and Mod I.  Long Term Goal 3: Pt will complete UB dressing with IND. Long Term Goal 4: Pt will complete LB dressing with AE PRN and Mod I.  Long Term Goal 5: Pt will doff/don footwear with AE PRN and Mod I. Additional Goals?: Yes  Long Term Goal 6: Pt will complete toileting with Mod I.  Long Term Goal 7: Pt will complete functional transfers (bed, chair, shower, toilet) with DME PRN and Mod I.  Long Term Goal 8: Pt will perform therex/therax to facilitate an increase in strength/endurance with emphasis on dynamic standing balance/tolerance > 8 mins with Mod I.  Long Term Goal 9: Pt will perform an IADL with Mod I. :                                       Eating: Eating  Assistance Needed: Independent  Comment: Pt opens butter and syrup packages and eats breakfast IND. CARE Score: 6  Discharge Goal: Independent       Oral Hygiene: Oral Hygiene  Assistance Needed: Supervision or touching assistance  Comment: SBA in stance to brush teeth at the sink.   CARE Score: 4  Discharge Goal: Independent    UB/LB Bathing: Shower/Bathe
Comprehensive Nutrition Assessment    Type and Reason for Visit:  Initial, Consult (oral nutrition supplement)    Nutrition Recommendations/Plan:   Continue regular diet at this time   Will offer oral nutrition supplement during stay  Will continue to follow up during stay      Malnutrition Assessment:  Malnutrition Status:  No malnutrition (05/11/23 1228)      Nutrition Assessment:    Admit to acute rehab unit with hx OA of right knee now s/p TKA. Able to tolerate regular diet with meal intake %. Patient reports appetite very good, would like more food at meals and family plans to bring in some snacks. Agreeable to oral nutrition supplement, was drinking some ensure supplements at home to prepare for surgery. No significant weight loss in past year, stable weight per hx. Will follow at low nutrition risk at this time. Nutrition Related Findings:    up in chair waitin on lunch, hx HTN, Afib, CHF    hx exercise at [facility] 3 days/week Wound Type: Surgical Incision (knee wrapped)       Current Nutrition Intake & Therapies:    Average Meal Intake: %  Average Supplements Intake: None Ordered  ADULT DIET; Regular    Anthropometric Measures:  Height: 5' 10\" (177.8 cm)  Ideal Body Weight (IBW): 166 lbs (75 kg)       Current Body Weight: 179 lb 14.3 oz (81.6 kg), 108.4 % IBW. Weight Source: Bed Scale  Current BMI (kg/m2): 25.8  Usual Body Weight: 180 lb (81.6 kg) (per hx May 2022)  % Weight Change (Calculated): -0.1  Weight Adjustment For: No Adjustment                 BMI Categories: Overweight (BMI 25.0-29. 9)    Estimated Daily Nutrient Needs:  Energy Requirements Based On: Kcal/kg  Weight Used for Energy Requirements: Current  Energy (kcal/day): 5558-7202 (25-27 breanne/kg)  Weight Used for Protein Requirements: Current  Protein (g/day):  (1.1-1.3 g/kg)  Method Used for Fluid Requirements: 1 ml/kcal  Fluid (ml/day): 2000    Nutrition Diagnosis:   Increased nutrient needs related to other (comment) (knee
Naomi Beasley    : 1940  Acct #: [de-identified]  MRN: [id number]              PM&R Progress Note      Admitting diagnosis: ***    Comorbid diagnoses impacting rehabilitation: ***    Chief complaint: ***    Prior (baseline) level of function: Independent. Current level of function:         Current  IRF-CIRA and Goals:   Occupational Therapy:    Short Term Goals  Time Frame for Short Term Goals: STGs=LTGs :   Long Term Goals  Time Frame for Long Term Goals : 7 days or until d/c. Long Term Goal 1: Pt will complete oral hygiene and grooming tasks with IND. Long Term Goal 2: Pt will complete total body bathing with AE PRN and Mod I.  Long Term Goal 3: Pt will complete UB dressing with IND. Long Term Goal 4: Pt will complete LB dressing with AE PRN and Mod I.  Long Term Goal 5: Pt will doff/don footwear with AE PRN and Mod I. Additional Goals?: Yes  Long Term Goal 6: Pt will complete toileting with Mod I.  Long Term Goal 7: Pt will complete functional transfers (bed, chair, shower, toilet) with DME PRN and Mod I.  Long Term Goal 8: Pt will perform therex/therax to facilitate an increase in strength/endurance with emphasis on dynamic standing balance/tolerance > 8 mins with Mod I.  Long Term Goal 9: Pt will perform an IADL with Mod I. :                                       Eating: Eating  Assistance Needed: Independent  Comment: Pt opens butter and syrup packages and eats breakfast IND. CARE Score: 6  Discharge Goal: Independent       Oral Hygiene: Oral Hygiene  Assistance Needed: Supervision or touching assistance  Comment: SBA in stance to brush teeth at the sink. CARE Score: 4  Discharge Goal: Independent    UB/LB Bathing: Shower/Bathe Self  Assistance Needed: Independent  Comment: Mod I to complete full shower, seated for majority on bench.   CARE Score: 6  Discharge Goal: Independent    UB Dressing: Upper Body Dressing  Assistance Needed: Setup or clean-up assistance  Comment: Setup assist to doff/don
Occupational Therapy    Physical Rehabilitation: OCCUPATIONAL THERAPY     [x] daily progress note       [] discharge       Patient Name:  Jules Lin   :  1940 MRN: [id number]  Room:  [address] [place] [address] Date of Admission: 2023  Rehabilitation Diagnosis:   Unilateral primary osteoarthritis, right knee [M17.11]  Primary localized osteoarthritis of right knee [M17.11]       Date 2023       Day of ARU Week:  4   Time IN/OUT 1105/1205   Individual Tx Minutes 60   Group Tx Minutes    Co-Treat Minutes    Concurrent Tx Minutes    TOTAL Tx Time Mins 60   Variance Time    Variance Time []   Refusal due to:     []   Medical hold/reason:    []   Illness   []   Off Unit for test/procedure  []   Extra time needed to complete task  []   Therapeutic need  []   Other (specify):   Restrictions Restrictions/Precautions: Fall Risk, General Precautions, Weight Bearing (WBAT RLE)         Communication with other providers: [x]   OK to see per nursing:     [x]   Spoke with Toña PT and Pt's nurse Leah regarding:   Collaboration to make Pt Mod I in the room with 2WW   Subjective observations and cognitive status: Pt sitting in recliner upon entrance, pleasant and agreeable to therapy. Pt requesting to be Mod I in the room with this OT stating the therapists will collaborate to make a decision. Pain level/location:    At rest 0; when up moving 2 /10       Location: R knee   Discharge recommendations  Anticipated discharge date:  05/15/23  Destination: []home alone   []home alone w assist prn   [x] home w/ family    [] Continuous supervision       []SNF    [] Assisted living     [] Other:   Continued therapy: [x]HHC OT  []OUTPATIENT  OT   [] No Further OT  Equipment needs: Jules Lin requires the assistance of a shower chair to successfully and safely complete bathing activities necessary due to reduced balance, endurance, need for ADL assist, and LE weakness and reduced ROM.  These tasks cannot be safely completed
Occupational Therapy    Physical Rehabilitation: OCCUPATIONAL THERAPY     [x] daily progress note       [] discharge       Patient Name:  Matias Wallace   :  1940 MRN: [id number]  Room:  [address] [place] [address] Date of Admission: 2023  Rehabilitation Diagnosis:   Unilateral primary osteoarthritis, right knee [M17.11]  Primary localized osteoarthritis of right knee [M17.11]       Date 2023       Day of ARU Week:  3   Time IN/OUT 1300/1400   Individual Tx Minutes 60   Group Tx Minutes    Co-Treat Minutes    Concurrent Tx Minutes    TOTAL Tx Time Mins 60   Variance Time    Variance Time []   Refusal due to:     []   Medical hold/reason:    []   Illness   []   Off Unit for test/procedure  []   Extra time needed to complete task  []   Therapeutic need  []   Other (specify):   Restrictions Restrictions/Precautions: Fall Risk, General Precautions, Weight Bearing (WBAT RLE)         Communication with other providers: [x]   OK to see per nursing:     []   Spoke with team member regarding:      Subjective observations and cognitive status: Pt sitting in recliner upon entrance with wife and daughter present in the room. Pt pleasant and agreeable to session, however hesitant to taking full shower d/t ace wrap still on. This OT ensured Pt that Dr. Pedro Jules cleared him for a shower with the RLE covered. Pain level/location:    2/10       Location: R knee   Discharge recommendations  Anticipated discharge date:  05/15  Destination: []home alone   []home alone w assist prn   [x] home w/ family    [] Continuous supervision       []SNF    [] Assisted living     [] Other:   Continued therapy: []HHC OT  []OUTPATIENT  OT   [x] No Further OT  Equipment needs: Matias Wallace requires the assistance of a shower chair to successfully and safely complete bathing activities necessary due to reduced balance, endurance, need for ADL assist, and LE weakness and reduced ROM.  These tasks cannot be safely completed without this device
Occupational Therapy    Physical Rehabilitation: OCCUPATIONAL THERAPY     [x] daily progress note       [x] discharge       Patient Name:  Doretha Escobar   :  1940 MRN: [id number]  Room:  [address] [place] [address] Date of Admission: 2023  Rehabilitation Diagnosis:   Unilateral primary osteoarthritis, right knee [M17.11]  Primary localized osteoarthritis of right knee [M17.11]       Date 5/15/2023       Day of ARU Week:  7   Time IN/OUT 0734/0806   Individual Tx Minutes 32   Group Tx Minutes    Co-Treat Minutes    Concurrent Tx Minutes    TOTAL Tx Time Mins 32   Variance Time    Variance Time []   Refusal due to:     []   Medical hold/reason:    []   Illness   []   Off Unit for test/procedure  []   Extra time needed to complete task  []   Therapeutic need  []   Other (specify):   Restrictions Restrictions/Precautions: Fall Risk, General Precautions, Weight Bearing (WBAT RLE)         Communication with other providers: [x]   OK to see per nursing:     []   Spoke with team member regarding:      Subjective observations and cognitive status: Pt in semi-esquivel's position, agreeable to full ADL session for d/c.    Pt did require verbal cues at times to not abandon walker. Pain level/location:   2 /10       Location: R knee   Discharge recommendations  Anticipated discharge date:  05/15/23  Destination: []home alone   []home alone w assist prn   [x] home w/ family    [] Continuous supervision       []SNF    [] Assisted living     [] Other:   Continued therapy: [x]HHC OT  []OUTPATIENT  OT   [] No Further OT  Equipment needs: Doretha Ecsobar requires the assistance of a shower chair to successfully and safely complete bathing activities necessary due to reduced balance, endurance, need for ADL assist, and LE weakness and reduced ROM. These tasks cannot be safely completed without this device and would place Doretha Escobar at greater risk of falls.        ADLs:    Eating: Eating  Assistance Needed: Independent  Comment:
Patient complaining of urinary frequency throughout night felt like he had to urinate every hour wants his urine checked for infection. Informed doctor with new orders noted. Patient aware.
Patient instructed and educated on [facility]. Patient able to do 2000 ml. Vital capacity. Patient's goal is 2250ml.  Electronically signed by José Whitehead RCP on 5/9/2023 at 4:33 PM
Physical Therapy    [x] daily progress note       [] discharge       Patient Name:  Kenneth Velasco   :  1940 MRN: [id number]  Room:  [address] [place] [address] Date of Admission: 2023  Rehabilitation Diagnosis:   Unilateral primary osteoarthritis, right knee [M17.11]  Primary localized osteoarthritis of right knee [M17.11]       Date 2023       Day of ARU Week:  4   Time IN/OUT 2772-4010   Individual Tx Minutes 60   Group Tx Minutes    Co-Treat Minutes    Concurrent Tx Minutes    TOTAL Tx Time Mins 60   Variance Time    Variance Time []   Refusal due to:     []   Medical hold/reason:    []   Illness   []   Off Unit for test/procedure  []   Extra time needed to complete task  []   Therapeutic need  []   Other (specify):   Restrictions Restrictions/Precautions  Restrictions/Precautions: Fall Risk, General Precautions, Weight Bearing (WBAT RLE)      Communication with other providers: [x]   OK to see per nursing:     []   Spoke with team member regarding:      Subjective observations and cognitive status: Pt sitting up in recliner. He is agreeable to therapy     Pain level/location:  2.5  /10       Location: right knee and calf   Discharge recommendations  Anticipated discharge date:  05/15/2023  Destination: []home alone   []home alone with assist PRN     [x] home w/ family      [] Continuous supervision  []SNF    [] Assisted living     [] Other:   Continued therapy: []HHC PT  [x]OUTPATIENT  PT   [] No Further PT  Equipment needs:  2ww  Kenneth Velasco requires the assistance of a front-wheeled walker to successfully ambulate from room to room at home to allow completion of daily living tasks such as: bathing, toileting, dressing and grooming. A wheeled walker is necessary due to the patient's unsteady gait, upper body weakness, inability to  a standard walker. This patient can ambulate only by pushing a walker instead of using a lesser assistive device such as a cane or crutch.           Bed Mobility:
Physical Therapy    [x] daily progress note       [] discharge       Patient Name:  Raj Cherry   :  1940 MRN: [id number]  Room:  [address] [place] [address] Date of Admission: 2023  Rehabilitation Diagnosis:   Unilateral primary osteoarthritis, right knee [M17.11]  Primary localized osteoarthritis of right knee [M17.11]       Date 2023       Day of ARU Week:  3   Time IN//1030  1400/1500   Individual Tx Minutes 60+60   Group Tx Minutes    Co-Treat Minutes    Concurrent Tx Minutes    TOTAL Tx Time Mins 120   Variance Time    Variance Time []   Refusal due to:     []   Medical hold/reason:    []   Illness   []   Off Unit for test/procedure  []   Extra time needed to complete task  []   Therapeutic need  []   Other (specify):   Restrictions Restrictions/Precautions  Restrictions/Precautions: Fall Risk, General Precautions, Weight Bearing (WBAT RLE)      Interdisciplinary communication [x]   Cleared for therapy per nursing     []   RN notified about issues during session  []   RN updated on pt performance  []   Spoke with   []   Spoke with OT  []   Spoke with MD  []   Other:    Subjective observations and cognitive status: (AM) Pt resting in recliner, states bladder issues (difficulty emptying) through the night that affected his sleep, acknowledges not drinking water so much and so he is trying to increase his hydration today. (PM) Pt resting in chair, reports improved ability to urinate.      Pain level/location: 0/10 at rest, 2/10 with mobility tasks and thera ex  Location: R knee and R calf   Discharge recommendations  Anticipated discharge date:  05/15/2023  Destination: []home alone   []home alone with assist PRN     [x] home w/ family      [] Continuous supervision  []SNF    [] Assisted living     [] Other:  Continued therapy: []HHC PT  [x]OUTPATIENT PT   [] No Further PT  []SNF PT  Caregiver training recommended: []Yes  [] No   Equipment needs: 2WW  Raj Cherry requires the
Physical Therapy    [x] daily progress note       [x] discharge       Patient Name:  Rickie Arce   :  1940 MRN: [id number]  Room:  [address] [place] [address] Date of Admission: 2023  Rehabilitation Diagnosis:   Unilateral primary osteoarthritis, right knee [M17.11]  Primary localized osteoarthritis of right knee [M17.11]       Date 5/15/2023       Day of ARU Week:  7   Time IN//1050   Individual Tx Minutes 70   Group Tx Minutes    Co-Treat Minutes    Concurrent Tx Minutes    TOTAL Tx Time Mins 70   Variance Time +10   Variance Time []   Refusal due to:     []   Medical hold/reason:    []   Illness   []   Off Unit for test/procedure  [x]   Extra time needed to complete tasks  []   Therapeutic need  []   Other (specify):   Restrictions Restrictions/Precautions  Restrictions/Precautions: Fall Risk, General Precautions, Weight Bearing (WBAT RLE)      Interdisciplinary communication [x]   Cleared for therapy per nursing     []   RN notified about issues during session  []   RN updated on pt performance  []   Spoke with   []   Spoke with OT  []   Spoke with MD  []   Other:    Subjective observations and cognitive status: Pt resting in recliner receiving AM meds from RN, states not sleeping well last night due to increased urinary frequency.     Pain level/location: 1/10 at rest 3/10 with initial weight bearing        Location: R knee and calf    Discharge recommendations  Anticipated discharge date:   05/15/2023  Destination: []home alone   []home alone with assist PRN     [x] home w/ family      [] Continuous supervision  []SNF    [] Assisted living     [] Other:  Continued therapy: []C PT  [x]OUTPATIENT PT   [] No Further PT  []SNF PT  Caregiver training recommended: []Yes  [x] No   Equipment needs: 2ww  Rickie Arce requires the assistance of a front-wheeled walker to successfully ambulate from room to room at home to allow completion of daily living tasks such as: bathing, toileting, dressing
Pt had increased urge to urinate with minimal success. Bladder scan completed after 2 attempt and noted 320ml on scan. Will notify PCP and continue to monitor.
Reviewed all discharge instructions with [patient and family. All questions and concerns answered. Dressing changed to knee incision which is well approximated. Escorted out to car independent with transfer from wheelchair to car.
This nurse reached out to Dr. Alexandrea Montano and got the okay to remove Ace wrap and change dressing to right knee.  Incision is well approximated with minimal drainage- no concerns at this time
activities/exercises completed this date:     []   ADL Training   [x]   Balance/Postural training     [x]   Transfer Training   [x]   Endurance Training   []   Neuromuscular Re-ed   []   Nu-step:  Time:        Level:         #Steps:       []   Rebounder:    []  Seated     []  Standing        []   Supine Ther Ex (reps/sets):     [x]   Seated Ther Ex (reps/sets): For BUE strengthening for ADL indep & functional moblility, pt completed arm bike use standing 15 min (5min increments) c Min resistance with 3 rest breaks & with min difficulty       [x]   Standing Ther Ex (reps/sets):  Pt completed therapeutic activity x  30 sit<>stands while completing reaching task to increase strength and aerobic capacity to complete ADLs and ADL transfers/functional mobility. .facilitating increased fine motor skills, including gross /grasp strengthening, pinch strengthening, in-hand manipulation, finger translation, and functional use of B hands. [x]   Other:  Educated then Instructed pt on transfer techniques to increase independence, transfer to/from various surfaces & promote safety when performing functional transfers      Comments:      Patient/Caregiver Education and Training:   [x]   [employer] Equipment Use  [x]   Transfer Technique/Safety  [x]   Energy Conservation Tips  []   Family training  [x]   Postural Awareness  [x]   Safety During Functional Activities  [x]   Reinforced Patient's Precautions   []   Progress was updated and reviewed in Rehabtracker with patient and/or family this         date. Treatment Plan for Next Session: continue in current POC      Assessment: This pt demonstrated a positive  response to today's treatment as evidenced by pt verbalization. The patient is making GOOD progress toward established goals as evidenced by QI scores. Ongoing deficits are observed in the areas of decreased strength and continued focus on increasing progressive strengthening is recommended.
Restrictions  Right Lower Extremity Weight Bearing: Weight Bearing As Tolerated    Pain Level: 4  Pain Location: R calf    IRF-Hearing and Speech: Hearing, Speech, and Vision  Expression of Ideas and Wants: Without difficulty  Understanding Verbal and Non-Verbal Content: Understands  IRF-COG:  Cognitive Patterns  Cognitive Pattern Assessment Used: BIMS  Repetition of Three Words (First Attempt): 3  Temporal Orientation: Year: Correct  Temporal Orientation: Month: Accurate within 5 days  Temporal Orientation: Day: Correct  Able to recall \"sock: Yes, no cue required  Able to recall \"blue\": Yes, no cue required  Able to recall \"bed\": Yes, no cue required  BIMS Summary Score: 15  IRF-CAM (Confusion Assessment Method): Confusion Assessment Method (CAM)  Is there evidence of an acute change in mental status from the patient's baseline?: No  Inattention: Behavior not present  Disorganized thinking: Behavior not present  Altered level of consciousness: Behavior not present    Objective:  Observation/Palpation  Observation: Pt is semi-esquivel's position upon entrance. Pt with ace wrap in place RLE. Vision  Vision Exceptions: Wears glasses at all times     Hearing  Hearing: Within functional limits    ROM:      LUE AROM (degrees)  LUE AROM : WNL     Left Hand AROM (degrees)  Left Hand AROM: WNL     RUE AROM (degrees)  RUE AROM : WNL     Right Hand AROM (degrees)  Right Hand AROM: WNL    Strength:    LUE Strength  Gross LUE Strength: WFL  L Hand General: 4+/5  LUE Strength Comment: 5/5 grossly  RUE Strength  Gross RUE Strength: WFL  R Hand General: 4+/5  RUE Strength Comment: 5/5 grossly    Quality of Movement:   Tone RUE  RUE Tone: Normotonic  Tone LUE  LUE Tone: Normotonic  Coordination  Movements Are Fluid And Coordinated: Yes  Coordination and Movement Description: Fine motor impairments       Sensation:    Sensation  Overall Sensation Status: WNL     ADLs:    Eating: Eating  Assistance Needed: Independent  Comment: Pt
Treatment/Activity Tolerance:   [x] Tolerated treatment with no adverse effects    [] Patient limited by fatigue  [] Patient limited by pain   [] Patient limited by medical complications:    [] Adverse reaction to Tx:   [] Significant change in status    Safety:       []  bed alarm set    []  chair alarm set    [x]  Pt refused personal chair alarm                []  Telesitter activated      [x]  Gait belt used during tx session      []other:       Number of Minutes/Billable Intervention  Gait Training 45   Therapeutic Exercise 10   Neuro Re-Ed    Therapeutic Activity 20   Wheelchair Propulsion    Group    Other:    TOTAL 75     Social History  Social/Functional History  Lives With: Spouse (Wife - Abdiaziz Pelletier (fair health). Pt has a son who stays with them a couple nights a week.)  Type of Home: House  Home Layout: Two level, Bed/Bath upstairs, Laundry in basement (Full flight with HR on left side ascending.)  Home Access: Stairs to enter with rails  Entrance Stairs - Number of Steps: Front entrance 6 + 3 steps with HR on L side ascending. Back entrance 3 SHANICE with HR.   Entrance Stairs - Rails: Left  Bathroom Shower/Tub: Tub/Shower unit, Doors (Tub is narrow.)  Bathroom Toilet: Handicap height (Has vanity and towel rack next to toilet he uses to push up.)  [place] Equipment: Grab bars in shower  Bathroom Accessibility: Walker accessible  Home Equipment: Cane, Reacher, Long-handled shoehorn, Sock aid, Leg   Has the patient had two or more falls in the past year or any fall with injury in the past year?: No  ADL Assistance: [facility]: Independent  Homemaking Responsibilities: Yes  Meal Prep Responsibility: No (Wife completes and they do takeout a lot.)  Laundry Responsibility: Primary (Shared responsibility with wife.)  Cleaning Responsibility: Secondary (Pt only occasionally assists with cleaning.)  Bill Paying/Finance Responsibility: Secondary (Shared responsibility with wife.)  Shopping
to ~7 inch step height using railings with CGA->SBA following appropriate step-to pattern on ascent/descent  CARE Score: 4  Discharge Goal: Independent       Wheelchair:  w/c Ability: Wheelchair Ability  Uses a Wheelchair and/or Scooter?: No                Balance:        Object: Picking Up Object  Assistance Needed: Supervision or touching assistance  Comment: CGA with 2WW; task completed without use of reacher  CARE Score: 4  Discharge Goal: Independent    I      Exam:    Blood pressure 134/68, pulse 72, temperature 97.9 °F (36.6 °C), temperature source Oral, resp. rate 18, height 5' 10\" (1.778 m), weight 179 lb 14.3 oz (81.6 kg), SpO2 96 %. General: ***    HEENT: ***    Pulmonary: ***    Cardiac: ***    Abdomen: Patient's abdomen is soft and nondistended. Bowel sounds were present throughout. There was no rebound, guarding or masses noted. Upper extremities: ***    Lower extremities: ***    Sitting balance was ***. Standing balance was ***.     Lab Results   Component Value Date    WBC 10.1 10/14/2022    HGB 13.1 (L) 10/14/2022    HCT 39.7 (L) 10/14/2022    MCV 89.8 10/14/2022     10/14/2022     Lab Results   Component Value Date    INR 0.96 06/05/2013    INR 0.98 03/26/2012    INR 0.92 01/07/2012    PROTIME 9.3 06/05/2013    PROTIME 9.8 03/26/2012    PROTIME 9.2 01/07/2012     Lab Results   Component Value Date    CREATININE 1.0 12/07/2022    BUN 32 (H) 12/07/2022     12/07/2022    K 4.7 12/07/2022     12/07/2022    CO2 25 12/07/2022     Lab Results   Component Value Date    ALT 17 07/19/2022    AST 17 07/19/2022    ALKPHOS 95 07/19/2022    BILITOT 0.8 07/19/2022       Expected length of stay  prior to a supervised level of function for discharge home with a walker and HHC OT/PT is ***    Recommendations:    ***
patient is an 78year old male admitted onto ARU after hospitalization for elective R TKA on 05/05/2023 due to end stage OA. Pt is WBAT on RLE. Pt with PMHx of A-fib, CAD, HTN, CHF, and CVA. 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 Pt presented with low level of post-op R knee pain at rest and with activities and had more pain on R calf that will need further assessment to rule out DVT. Pt's RLE had an extensive ace wrapping today limiting R knee ROM assessment especially extension and observation/palpation of R calf. Pt had >90* of R knee flexion that he did not use compensatory movements during sit<->stand transfers, however he had impaired gait quality due to ROM and strength limitations and pain. Pt followed 1-2 step commands consistently and was receptive to education. He was able to carry out appropriate step sequencing on stairs as he was already performing this pattern at baseline. Pt is expected to progress towards established PT goals based on his cognition, current physical deficits, motivations, and high pain tolerance.      Body Structures, Functions, Activity Limitations Requiring Skilled Therapeutic Intervention: Decreased functional mobility , Decreased ADL status, Decreased ROM, Decreased strength, Decreased balance, Decreased high-level IADLs, Increased pain     Therapy Prognosis: Good  Decision Making: Medium Complexity  Clinical Presentation: evolving with changing characteristics      Patient education:   ARU schedule, ARU expectations for participation, plan of care  Treatment Initiated:  Functional mobility training, gait training, patient education  Barriers to Improvement:  pain if uncontrolled (chronic back and acute post-op and R calf), urinary bladder status   Discharge Recommendations:  OP PT  Equipment Recommendations:  [address] requires the assistance of a front-wheeled walker to successfully

## 2023-05-17 ENCOUNTER — HOSPITAL ENCOUNTER (OUTPATIENT)
Dept: PHYSICAL THERAPY | Age: 83
Setting detail: THERAPIES SERIES
Discharge: HOME | End: 2023-05-17
Payer: MEDICARE

## 2023-05-17 PROCEDURE — 97110 THERAPEUTIC EXERCISES: CPT

## 2023-05-17 PROCEDURE — 97162 PT EVAL MOD COMPLEX 30 MIN: CPT

## 2023-05-17 NOTE — PROGRESS NOTES
ultrasound, heat, cold, and foam roll  Manual therapy including: STM, MFR, and TPR  Aquatic Therapy  Therapeutic taping  HEP and education    Goals  Short term goals to be deferred to long term goals. Long term goals to be achieved by June 30, 2023:   Long term goal 1: Pt will demonstrate I with current HEP as prescribed in order to increase ROM and strength. Long term goal 2: Pt will demonstrate R knee extension to 0 degrees in order to improve ROM. Long term goal 3: Pt will demonstrate R knee flexion to 130 degrees in order to improve ROM. Long term goal 4: Pt will demonstrate the ability to safely complete at least 300 feet in the 2 MWT with no AD in order to improve functional mobility. Note: Goals, frequency, plan, and recommendations will be updated as needed. Goals and treatment plan discussed with family and mutually agreed upon. YES   Will discharge patient when therapy goals have been met or when therapy is no longer deemed necessary. This plan was reviewed with the patient/family and they were in agreement. Electronically signed by:  Sakshi Canchola PT,DPT [id number] Date: 5/17/2023, Time: 07:87 AM      I certify that the above patient is under my care and requires the above skilled services. These professional services are to be provided from an established plan, reviewed by me at least every 90 days. These services are related to the diagnosis stated above and are medically necessary.     Date last seen by physician:_________________________________________________    Physician Signature:____________________________________________Date:_______________

## 2023-05-17 NOTE — FLOWSHEET NOTE
Outpatient Physical Therapy  [place]           [] Phone: [phone]   Fax: [phone]  [facility]           [x] Phone: [phone]   Fax: [phone]        Physical Therapy Daily Treatment Note  Date:  2023    Patient Name:  Hannha Reason \"Luis\"  Timmy Bussing    :  1940  MRN: [id number]  Restrictions/Precautions: fall risk  Diagnosis:   Aftercare following right knee joint replacement surgery [Z47.1, Z96.651]    Date of Injury/Surgery: R TKA 23  pt was in ARU from -5/15/23. Treatment Diagnosis:   M62.81 muscle weakness, R26.89 abnormality of gait  Insurance/Certification information:  Medicare  Referring Physician:  Pillo Salazar MD     PCP: Jesus Luna MD  Plan of care signed (Y/N):  eval co sign sent  Outcome Measure: 2MWT: 296.8 feet  Visit# / total visits:   1/10 then PN  Pain level: 2/10   Goals:     Patient goals:  to get around better and ambulate with no AD    Long term goals to be achieved by 2023:   Long term goal 1: Pt will demonstrate I with current HEP as prescribed in order to increase ROM and strength. Long term goal 2: Pt will demonstrate R knee extension to 0 degrees in order to improve ROM. Long term goal 3: Pt will demonstrate R knee flexion to 130 degrees in order to improve ROM. Long term goal 4: Pt will demonstrate the ability to safely complete at least 300 feet in the 2 MWT with no AD in order to improve functional mobility. Subjective:  See eval     Any changes in Ambulatory Summary Sheet?   None    Objective:  See eval     Exercises: (No more than 4 columns)   Exercise/Equipment Date: 23 Date Date           WARM UP       bike   X5' S5 Lvl 2           TABLE      Quad set w/ 1/2 FR under heel 1x10 5\"     SAQ 1x15 5\"  2# 1x15 5\"   Med roll                          STANDING      Heel raises      High knee marches      Ankle DF stretch      3 way hip       TKE       Forward step ups      Lateral step ups      Gait training with SPC 1 lap

## 2023-05-22 ENCOUNTER — HOSPITAL ENCOUNTER (OUTPATIENT)
Dept: PHYSICAL THERAPY | Age: 83
Setting detail: THERAPIES SERIES
Discharge: HOME | End: 2023-05-22
Payer: MEDICARE

## 2023-05-22 PROCEDURE — 97112 NEUROMUSCULAR REEDUCATION: CPT

## 2023-05-22 PROCEDURE — 97530 THERAPEUTIC ACTIVITIES: CPT

## 2023-05-22 PROCEDURE — 97110 THERAPEUTIC EXERCISES: CPT

## 2023-05-22 NOTE — FLOWSHEET NOTE
Outpatient Physical Therapy  [place]           [] Phone: [phone]   Fax: [phone]  [facility]           [x] Phone: [phone]   Fax: [phone]        Physical Therapy Daily Treatment Note  Date:  2023    Patient Name:  Mahalia Soulier \"Luis\"  Sheri Brantley    :  1940  MRN: [id number]  Restrictions/Precautions: fall risk  Diagnosis:   Aftercare following right knee joint replacement surgery [Z47.1, Z96.651]    Date of Injury/Surgery: R TKA 23  pt was in ARU from -5/15/23. Treatment Diagnosis:   M62.81 muscle weakness, R26.89 abnormality of gait  Insurance/Certification information:  Medicare  Referring Physician:  Isabela Kirkland MD     PCP: Ilya Ramsay MD  Plan of care signed (Y/N):  eval co sign sent  Outcome Measure: 2MWT: 296.8 feet  Visit# / total visits:   2/10 then PN  Pain level: 2/10   Goals:     Patient goals:  to get around better and ambulate with no AD    Long term goals to be achieved by 2023:   Long term goal 1: Pt will demonstrate I with current HEP as prescribed in order to increase ROM and strength. Long term goal 2: Pt will demonstrate R knee extension to 0 degrees in order to improve ROM. Long term goal 3: Pt will demonstrate R knee flexion to 130 degrees in order to improve ROM. Long term goal 4: Pt will demonstrate the ability to safely complete at least 300 feet in the 2 MWT with no AD in order to improve functional mobility. Subjective:   patient reports of 2/10 pain upon arrival and appears amb with a RW    Any changes in Ambulatory Summary Sheet?   None    Objective:   AAROM Flex 125*   Ext 0*    Exercises: (No more than 4 columns)   Exercise/Equipment Date: 23 Date 23 Date           WARM UP       bike   X5' S5 Lvl 2 S5-4  Lv2 10'          TABLE      Quad set w/ 1/2 FR under heel 1x10 5\"     SAQ 1x15 5\"  2# 1x15 5\"   Med roll Small roll 2x10 5\" focus on ext    Heel slides w/strap  10x10\"    SLR initiate w/DFand QS  10x    Side

## 2023-05-24 ENCOUNTER — HOSPITAL ENCOUNTER (OUTPATIENT)
Dept: PHYSICAL THERAPY | Age: 83
Setting detail: THERAPIES SERIES
Discharge: HOME | End: 2023-05-24
Payer: MEDICARE

## 2023-05-24 PROCEDURE — 97530 THERAPEUTIC ACTIVITIES: CPT

## 2023-05-24 PROCEDURE — 97110 THERAPEUTIC EXERCISES: CPT

## 2023-05-24 NOTE — FLOWSHEET NOTE
Outpatient Physical Therapy  [place]           [] Phone: [phone]   Fax: [phone]  Kay[facility] hussein           [x] Phone: [phone]   Fax: [phone]        Physical Therapy Daily Treatment Note  Date:  2023    Patient Name:  Rhoda Cameron \"Luis\"  Ambrose Arroyo Hondo    :  1940  MRN: [id number]  Restrictions/Precautions: fall risk  Diagnosis:   Aftercare following right knee joint replacement surgery [Z47.1, Z96.651]    Date of Injury/Surgery: R TKA 23  pt was in ARU from -5/15/23. Treatment Diagnosis:   M62.81 muscle weakness, R26.89 abnormality of gait  Insurance/Certification information:  Medicare  Referring Physician:  Justina Winn MD     PCP: Leonela Guerra MD  Plan of care signed (Y/N):  rodrick co sign sent  Outcome Measure: 2MWT: 296.8 feet  Visit# / total visits:   3/10 then PN  Pain level: 1.5/10   Goals:     Patient goals:  to get around better and ambulate with no AD    Long term goals to be achieved by 2023:   Long term goal 1: Pt will demonstrate I with current HEP as prescribed in order to increase ROM and strength. Long term goal 2: Pt will demonstrate R knee extension to 0 degrees in order to improve ROM. Long term goal 3: Pt will demonstrate R knee flexion to 130 degrees in order to improve ROM. Long term goal 4: Pt will demonstrate the ability to safely complete at least 300 feet in the 2 MWT with no AD in order to improve functional mobility. Subjective:    Patient rates his pain at a 1.5/10 upon arrival and reports his phys is pleased with his progress. Any changes in Ambulatory Summary Sheet?   None    Objective:    AROM Flex 123*   Ext 0*    Exercises: (No more than 4 columns)   Exercise/Equipment Date: 23 Date 23 Date 23        Take shoe off when on table   WARM UP       bike   X5' S5 Lvl 2 S5-4  Lv2 10' S5 Lv2 7'         TABLE      Quad set w/ 1/2 FR under heel 1x10 5\"  10x10\" on table   SAQ 1x15 5\"  2# 1x15 5\"   Med roll Small roll

## 2023-05-31 ENCOUNTER — HOSPITAL ENCOUNTER (OUTPATIENT)
Dept: PHYSICAL THERAPY | Age: 83
Setting detail: THERAPIES SERIES
Discharge: HOME | End: 2023-05-31
Payer: MEDICARE

## 2023-05-31 PROCEDURE — 97530 THERAPEUTIC ACTIVITIES: CPT

## 2023-05-31 PROCEDURE — 97110 THERAPEUTIC EXERCISES: CPT

## 2023-05-31 NOTE — FLOWSHEET NOTE
Re-education    [] Cold/hotpack [] Iontophoresis   [x] Instruction in HEP      [] Vasopneumatic   [] Dry Needling    [x] Manual Therapy               [] Aquatic Therapy              Electronically signed by:  Karon Young PTA  5/31/2023, 11:14 AM

## 2023-06-02 ENCOUNTER — HOSPITAL ENCOUNTER (OUTPATIENT)
Dept: PHYSICAL THERAPY | Age: 83
Setting detail: THERAPIES SERIES
Discharge: HOME | End: 2023-06-02
Payer: MEDICARE

## 2023-06-02 PROCEDURE — 97110 THERAPEUTIC EXERCISES: CPT

## 2023-06-02 NOTE — FLOWSHEET NOTE
Outpatient Physical Therapy  [place]           [] Phone: [phone]   Fax: [phone]  [facility]           [x] Phone: [phone]   Fax: [phone]        Physical Therapy Daily Treatment Note  Date:  2023    Patient Name:  Mary Carmen Mirza \"Luis\"  Sunshine Jeter    :  1940  MRN: [id number]  Restrictions/Precautions: fall risk  Diagnosis:   Aftercare following right knee joint replacement surgery [Z47.1, Z96.651]    Date of Injury/Surgery: R TKA 23  pt was in ARU from -5/15/23. Treatment Diagnosis:   M62.81 muscle weakness, R26.89 abnormality of gait  Insurance/Certification information:  Medicare  Referring Physician:  Wilman Watts MD     PCP: Zafar Beal MD  Plan of care signed (Y/N):  eval co sign sent  Outcome Measure: 2MWT: 296.8 feet  Visit# / total visits:   /10 then PN  Pain level: 0/10   Goals:     Patient goals:  to get around better and ambulate with no AD    Long term goals to be achieved by 2023:   Long term goal 1: Pt will demonstrate I with current HEP as prescribed in order to increase ROM and strength. Long term goal 2: Pt will demonstrate R knee extension to 0 degrees in order to improve ROM. Long term goal 3: Pt will demonstrate R knee flexion to 130 degrees in order to improve ROM. Long term goal 4: Pt will demonstrate the ability to safely complete at least 300 feet in the 2 MWT with no AD in order to improve functional mobility. Subjective:    Patient rates his pain at a 0/10 upon arrival and voices no new c/o. He arrived with [facility]. Any changes in Ambulatory Summary Sheet?   None    Objective:  Ext 0* after ext stretch    Exercises: (No more than 4 columns)   Exercise/Equipment Date 23 Date: 23      Take shoe off when on table     WARM UP       bike   S5 Lv2 7' S5 Lv2 10' X10' S5 Lvl 3         TABLE      Quad set w/ 1/2 FR under heel 10x10\" on table Small roll 10x10\" 1x10 10\" 1/2 FR    SAQ Small roll 2x10 5\" focus on ext

## 2023-06-05 ENCOUNTER — HOSPITAL ENCOUNTER (OUTPATIENT)
Dept: PHYSICAL THERAPY | Age: 83
Setting detail: THERAPIES SERIES
Discharge: HOME | End: 2023-06-05
Payer: MEDICARE

## 2023-06-05 PROCEDURE — 97530 THERAPEUTIC ACTIVITIES: CPT

## 2023-06-05 PROCEDURE — 97110 THERAPEUTIC EXERCISES: CPT

## 2023-06-05 NOTE — FLOWSHEET NOTE
Outpatient Physical Therapy  [place]           [] Phone: [phone]   Fax: [phone]  [place]           [x] Phone: [phone]   Fax: [phone]        Physical Therapy Daily Treatment Note  Date:  2023    Patient Name:  Amna Casiano \"Luis\"  Sheridan Gray    :  1940  MRN: [id number]  Restrictions/Precautions: fall risk  Diagnosis:   Aftercare following right knee joint replacement surgery [Z47.1, Z96.651]    Date of Injury/Surgery: R TKA 23  pt was in ARU from -5/15/23. Treatment Diagnosis:   M62.81 muscle weakness, R26.89 abnormality of gait  Insurance/Certification information:  Medicare  Referring Physician:  Bryson Carrasco MD     PCP: Yovani Recio MD  Plan of care signed (Y/N):  rodrick co sign sent  Outcome Measure: 2MWT: 296.8 feet  Visit# / total visits:   6/10 then PN  Pain level: 0/10   Goals:     Patient goals:  to get around better and ambulate with no AD    Long term goals to be achieved by 2023:   Long term goal 1: Pt will demonstrate I with current HEP as prescribed in order to increase ROM and strength. Long term goal 2: Pt will demonstrate R knee extension to 0 degrees in order to improve ROM. Long term goal 3: Pt will demonstrate R knee flexion to 130 degrees in order to improve ROM. Long term goal 4: Pt will demonstrate the ability to safely complete at least 300 feet in the 2 MWT with no AD in order to improve functional mobility. Subjective:    Patient rates his pain at a 0/10 upon arrival and voices no new c/o. He arrived with [facility]. Any changes in Ambulatory Summary Sheet?   None    Objective:   Ext 0* after ext stretch    Exercises: (No more than 4 columns)   Exercise/Equipment 23 Date: 23  (6)        Take shoe off when on table   WARM UP       bike   S5 Lv2 10' X10' S5 Lvl 3 S5 Lv3 10'         TABLE      Quad set w/ 1/2 FR under heel Small roll 10x10\" 1x10 10\" 1/2 FR  1x10 10\" 1/2 FR    SAQ Small roll 2x10 5\" focus on ext

## 2023-06-07 ENCOUNTER — HOSPITAL ENCOUNTER (OUTPATIENT)
Dept: PHYSICAL THERAPY | Age: 83
Setting detail: THERAPIES SERIES
Discharge: HOME | End: 2023-06-07
Payer: MEDICARE

## 2023-06-07 PROCEDURE — 97110 THERAPEUTIC EXERCISES: CPT

## 2023-06-07 PROCEDURE — 97530 THERAPEUTIC ACTIVITIES: CPT

## 2023-06-19 ENCOUNTER — HOSPITAL ENCOUNTER (OUTPATIENT)
Dept: PHYSICAL THERAPY | Age: 83
Setting detail: THERAPIES SERIES
Discharge: HOME | End: 2023-06-19
Payer: MEDICARE

## 2023-06-19 PROCEDURE — 97110 THERAPEUTIC EXERCISES: CPT

## 2023-06-19 RX ORDER — ATORVASTATIN CALCIUM 40 MG/1
40 TABLET, FILM COATED ORAL DAILY
Qty: 90 TABLET | Refills: 3 | Status: SHIPPED | OUTPATIENT
Start: 2023-06-19

## 2023-06-19 NOTE — FLOWSHEET NOTE
Outpatient Physical Therapy  [place]           [] Phone: [phone]   Fax: [phone]  [place]           [x] Phone: [phone]   Fax: [phone]        Physical Therapy Daily Treatment Note  Date:  2023    Patient Name:  Pablito Fletcher \"Luis\"  Rico Seaman    :  1940  MRN: [id number]  Restrictions/Precautions: fall risk  Diagnosis:   Aftercare following right knee joint replacement surgery [Z47.1, Z96.651]    Date of Injury/Surgery: R TKA 23  pt was in ARU from -5/15/23. Treatment Diagnosis:   M62.81 muscle weakness, R26.89 abnormality of gait  Insurance/Certification information:  Medicare  Referring Physician:  Sailaja Obrien MD     PCP: Corey Thomason MD  Plan of care signed (Y/N):  brunoal co sign sent  Outcome Measure: 2MWT: 296.8 feet  Visit# / total visits:   10/10 then PN  Pain level: 1/10   Goals:     Patient goals:  to get around better and ambulate with no AD    Long term goals to be achieved by 2023:   Long term goal 1: Pt will demonstrate I with current HEP as prescribed in order to increase ROM and strength. Long term goal 2: Pt will demonstrate R knee extension to 0 degrees in order to improve ROM. Long term goal 3: Pt will demonstrate R knee flexion to 130 degrees in order to improve ROM. Long term goal 4: Pt will demonstrate the ability to safely complete at least 300 feet in the 2 MWT with no AD in order to improve functional mobility. Subjective:   pt reports little pain today 1/10    Any changes in Ambulatory Summary Sheet?   None    Objective:  LEFs: 51/80 = 63.75% ability = 36.25% disability     2MWT: 323.10 feet with SPC  AROM  LE  Knee:    Right   Knee flexion     130   degrees   Knee extension      0  degrees     Exercises: (No more than 4 columns)   Exercise/Equipment 23  (6) 23 Date: 23       Take shoe off when on table     6MWT no AD   WARM UP          bike   S5 Lv3 10' S5 x10' L3 S5 x10' L3 S5 x10' L3 X10' S5

## 2023-06-21 ENCOUNTER — HOSPITAL ENCOUNTER (OUTPATIENT)
Dept: PHYSICAL THERAPY | Age: 83
Setting detail: THERAPIES SERIES
Discharge: HOME | End: 2023-06-21
Payer: MEDICARE

## 2023-06-21 PROCEDURE — 97110 THERAPEUTIC EXERCISES: CPT

## 2023-06-21 PROCEDURE — 97530 THERAPEUTIC ACTIVITIES: CPT

## 2023-06-21 NOTE — FLOWSHEET NOTE
Outpatient Physical Therapy  [place]           [] Phone: [phone]   Fax: [phone]  [place]           [x] Phone: [phone]   Fax: [phone]        Physical Therapy Daily Treatment Note  Date:  2023    Patient Name:  Pablito Fletcher \"Luis\"  Rico Seaman    :  1940  MRN: [id number]  Restrictions/Precautions: fall risk  Diagnosis:   Aftercare following right knee joint replacement surgery [Z47.1, Z96.651]    Date of Injury/Surgery: R TKA 23  pt was in ARU from -5/15/23. Treatment Diagnosis:   M62.81 muscle weakness, R26.89 abnormality of gait  Insurance/Certification information:  Medicare  Referring Physician:  Sailaja Obrien MD     PCP: Corey Thomason MD  Plan of care signed (Y/N):  brunoal co sign sent  Outcome Measure: 2MWT: 296.8 feet  Visit# / total visits:   11/10 then PN  Pain level: 0/10   Goals:     Patient goals:  to get around better and ambulate with no AD    Long term goals to be achieved by 2023:   Long term goal 1: Pt will demonstrate I with current HEP as prescribed in order to increase ROM and strength. Long term goal 2: Pt will demonstrate R knee extension to 0 degrees in order to improve ROM. Long term goal 3: Pt will demonstrate R knee flexion to 130 degrees in order to improve ROM. Long term goal 4: Pt will demonstrate the ability to safely complete at least 300 feet in the 2 MWT with no AD in order to improve functional mobility. Subjective:    patient denies pain upon arrival and appears amb with a single point cane     Any changes in Ambulatory Summary Sheet?   None    Objective:   986 6MWT no AD       AROM  LE  Knee:    Right   Knee flexion     130   degrees   Knee extension      0  degrees     Exercises: (No more than 4 columns)   Exercise/Equipment 23 Date: 23  (11)         6MWT no AD   WARM UP        bike   S5 x10' L3 S5 x10' L3 X10' S5 Lv 3-4 S5 Lv3  10'          TABLE       Quad set w/ 1/2 FR under heel 1x12

## 2023-06-22 NOTE — DISCHARGE SUMMARY
[facility] Outpatient Physical Therapy  [address] [place] [address]  Phone: [phone]  Fax: [phone]      Physician: Dr Lee Guaman. Devin Celis MD      From: Areli Paredes, PT, DPT     Patient: Joann Villanueva                  : 1940  Diagnosis: Aftercare following right knee joint replacement surgery [Z47.1, Z96.651]         Date: 2023  Treatment Diagnosis:  M62.81 muscle weakness, R26.89 abnormality of gait    []  Progress Note                [x]  Discharge Note    Total Visits to date:   11 Cancels/No-shows to date:  0    Subjective: Pt reports he no longer has pain. He reports that he is doing well, but he continues to use the [facility]  mainly for his L LE      Plan of Care/Treatment to date:  [x] Therapeutic Exercise    [] Modalities:  [x] Therapeutic Activity     [] Ultrasound  [] Electric Stimulation  [x] Gait Training      [] Cervical Traction    [] Lumbar Traction  [x] Neuromuscular Re-education  [] Cold/hotpack [] Iontophoresis  [x] Instruction in HEP      Other:  [] Manual Therapy       []  Vasopneumatic  [] Aquatic Therapy       []                          Objective/Significant Findings At Last Visit/Comments:  LEFs: 51/80 = 63.75% ability = 36.25% disability    2MWT: 323.10 feet with SPC  986 6MWT no AD  = 328.67 2MWT no AD     AROM  LE  Knee:     Right   Knee flexion     130   degrees   Knee extension      0  degrees        Assessment: Pt is a pleasant 79 yo male who no longer requires skilled PT following his R TKA. He continues to ambulate with a SPC secondary to difficulty with his L LE      Goal Status:  [x] Achieved [] Partially Achieved  [] Not Achieved   Long term goals to be achieved by 2023:   Long term goal 1: Pt will demonstrate I with current HEP as prescribed in order to increase ROM and strength. - MET, discharge goal.   Long term goal 2: Pt will demonstrate R knee extension to 0 degrees in order to improve ROM.  - MET, discharge goal.   Ankit Anderson

## 2023-08-08 ENCOUNTER — HOSPITAL ENCOUNTER (INPATIENT)
Age: 83
DRG: 560 | End: 2023-08-08
Attending: PHYSICAL MEDICINE & REHABILITATION | Admitting: PHYSICAL MEDICINE & REHABILITATION
Payer: MEDICARE

## 2023-08-08 PROBLEM — M17.12 PRIMARY OSTEOARTHRITIS OF LEFT KNEE: Status: ACTIVE | Noted: 2023-08-08

## 2023-08-08 PROBLEM — R39.11 BENIGN PROSTATIC HYPERPLASIA WITH URINARY HESITANCY: Status: ACTIVE | Noted: 2023-08-08

## 2023-08-08 PROBLEM — N40.1 BENIGN PROSTATIC HYPERPLASIA WITH URINARY HESITANCY: Status: ACTIVE | Noted: 2023-08-08

## 2023-08-08 PROBLEM — Z86.73 HISTORY OF CVA (CEREBROVASCULAR ACCIDENT): Status: ACTIVE | Noted: 2023-08-08

## 2023-08-08 PROCEDURE — 6370000000 HC RX 637 (ALT 250 FOR IP): Performed by: PHYSICAL MEDICINE & REHABILITATION

## 2023-08-08 PROCEDURE — 94761 N-INVAS EAR/PLS OXIMETRY MLT: CPT

## 2023-08-08 PROCEDURE — 1280000000 HC REHAB R&B

## 2023-08-08 PROCEDURE — 99223 1ST HOSP IP/OBS HIGH 75: CPT | Performed by: PHYSICAL MEDICINE & REHABILITATION

## 2023-08-08 RX ORDER — ATORVASTATIN CALCIUM 40 MG/1
40 TABLET, FILM COATED ORAL NIGHTLY
Status: DISCONTINUED | OUTPATIENT
Start: 2023-08-08 | End: 2023-08-16 | Stop reason: HOSPADM

## 2023-08-08 RX ORDER — ACETAMINOPHEN 325 MG/1
650 TABLET ORAL
Status: DISCONTINUED | OUTPATIENT
Start: 2023-08-08 | End: 2023-08-16 | Stop reason: HOSPADM

## 2023-08-08 RX ORDER — ONDANSETRON 4 MG/1
4 TABLET, ORALLY DISINTEGRATING ORAL 4 TIMES DAILY PRN
Status: DISCONTINUED | OUTPATIENT
Start: 2023-08-08 | End: 2023-08-16 | Stop reason: HOSPADM

## 2023-08-08 RX ORDER — ASPIRIN 81 MG/1
81 TABLET, CHEWABLE ORAL DAILY
Status: DISCONTINUED | OUTPATIENT
Start: 2023-08-09 | End: 2023-08-16 | Stop reason: HOSPADM

## 2023-08-08 RX ORDER — OXYCODONE HYDROCHLORIDE 10 MG/1
10 TABLET ORAL EVERY 4 HOURS PRN
Status: DISCONTINUED | OUTPATIENT
Start: 2023-08-08 | End: 2023-08-15

## 2023-08-08 RX ORDER — BISACODYL 5 MG/1
5 TABLET, DELAYED RELEASE ORAL
Status: COMPLETED | OUTPATIENT
Start: 2023-08-09 | End: 2023-08-11

## 2023-08-08 RX ORDER — DOCUSATE SODIUM 100 MG/1
100 CAPSULE, LIQUID FILLED ORAL DAILY
Status: DISCONTINUED | OUTPATIENT
Start: 2023-08-08 | End: 2023-08-15

## 2023-08-08 RX ORDER — AMLODIPINE BESYLATE 5 MG/1
5 TABLET ORAL DAILY
Status: DISCONTINUED | OUTPATIENT
Start: 2023-08-09 | End: 2023-08-16 | Stop reason: HOSPADM

## 2023-08-08 RX ORDER — AMIODARONE HYDROCHLORIDE 200 MG/1
100 TABLET ORAL DAILY
Status: DISCONTINUED | OUTPATIENT
Start: 2023-08-09 | End: 2023-08-16 | Stop reason: HOSPADM

## 2023-08-08 RX ORDER — LISINOPRIL 20 MG/1
20 TABLET ORAL DAILY
Status: DISCONTINUED | OUTPATIENT
Start: 2023-08-09 | End: 2023-08-16 | Stop reason: HOSPADM

## 2023-08-08 RX ORDER — SENNOSIDES A AND B 8.6 MG/1
1 TABLET, FILM COATED ORAL NIGHTLY
Status: DISCONTINUED | OUTPATIENT
Start: 2023-08-08 | End: 2023-08-15

## 2023-08-08 RX ORDER — CLOPIDOGREL BISULFATE 75 MG/1
75 TABLET ORAL DAILY
Status: DISCONTINUED | OUTPATIENT
Start: 2023-08-09 | End: 2023-08-16 | Stop reason: HOSPADM

## 2023-08-08 RX ORDER — POLYETHYLENE GLYCOL 3350 17 G/17G
17 POWDER, FOR SOLUTION ORAL DAILY PRN
Status: DISCONTINUED | OUTPATIENT
Start: 2023-08-08 | End: 2023-08-16 | Stop reason: HOSPADM

## 2023-08-08 RX ORDER — M-VIT,TX,IRON,MINS/CALC/FOLIC 27MG-0.4MG
1 TABLET ORAL DAILY
Status: DISCONTINUED | OUTPATIENT
Start: 2023-08-09 | End: 2023-08-16 | Stop reason: HOSPADM

## 2023-08-08 RX ORDER — OXYCODONE HYDROCHLORIDE 5 MG/1
5 TABLET ORAL EVERY 4 HOURS PRN
Status: DISCONTINUED | OUTPATIENT
Start: 2023-08-08 | End: 2023-08-15

## 2023-08-08 RX ORDER — HYDROCHLOROTHIAZIDE 25 MG/1
12.5 TABLET ORAL DAILY
Status: DISCONTINUED | OUTPATIENT
Start: 2023-08-09 | End: 2023-08-16 | Stop reason: HOSPADM

## 2023-08-08 RX ADMIN — ATORVASTATIN CALCIUM 40 MG: 40 TABLET, FILM COATED ORAL at 22:02

## 2023-08-08 RX ADMIN — ACETAMINOPHEN 650 MG: 325 TABLET ORAL at 17:22

## 2023-08-08 RX ADMIN — ACETAMINOPHEN 650 MG: 325 TABLET ORAL at 22:02

## 2023-08-08 RX ADMIN — SENNOSIDES 8.6 MG: 8.6 TABLET, FILM COATED ORAL at 22:02

## 2023-08-08 RX ADMIN — DOCUSATE SODIUM 100 MG: 100 CAPSULE, LIQUID FILLED ORAL at 17:23

## 2023-08-08 RX ADMIN — OXYCODONE HYDROCHLORIDE 5 MG: 5 TABLET ORAL at 22:05

## 2023-08-08 RX ADMIN — OXYCODONE HYDROCHLORIDE 5 MG: 5 TABLET ORAL at 17:22

## 2023-08-08 ASSESSMENT — PAIN DESCRIPTION - ORIENTATION
ORIENTATION: LEFT

## 2023-08-08 ASSESSMENT — PAIN DESCRIPTION - LOCATION
LOCATION: KNEE

## 2023-08-08 ASSESSMENT — PAIN DESCRIPTION - DESCRIPTORS
DESCRIPTORS: ACHING
DESCRIPTORS: ACHING;SORE
DESCRIPTORS: ACHING
DESCRIPTORS: ACHING
DESCRIPTORS: ACHING;SORE

## 2023-08-08 ASSESSMENT — PAIN DESCRIPTION - FREQUENCY
FREQUENCY: CONTINUOUS
FREQUENCY: CONTINUOUS

## 2023-08-08 ASSESSMENT — PAIN SCALES - GENERAL
PAINLEVEL_OUTOF10: 6
PAINLEVEL_OUTOF10: 6
PAINLEVEL_OUTOF10: 2
PAINLEVEL_OUTOF10: 6
PAINLEVEL_OUTOF10: 2

## 2023-08-08 ASSESSMENT — LIFESTYLE VARIABLES
HOW MANY STANDARD DRINKS CONTAINING ALCOHOL DO YOU HAVE ON A TYPICAL DAY: PATIENT DOES NOT DRINK
HOW OFTEN DO YOU HAVE A DRINK CONTAINING ALCOHOL: NEVER

## 2023-08-08 ASSESSMENT — PAIN DESCRIPTION - PAIN TYPE
TYPE: SURGICAL PAIN

## 2023-08-08 ASSESSMENT — PAIN DESCRIPTION - ONSET: ONSET: ON-GOING

## 2023-08-08 NOTE — PROGRESS NOTES
in box. If not, SKIP down to [facility]" question and continue**     Trouble falling or staying asleep, or sleeping too much   [] 0. No  [] 1. Yes  [] 9. No Response [] 0. Never or one day  [] 1.  2-6 days (several days)  [] 2.  7-11 days (half or more of days)  [] 3.  12-14 days (nearly every day   Feeling tired or having little energy   [] 0. No  [] 1. Yes  [] 9. No Response [] 0. Never or one day  [] 1.  2-6 days (several days)  [] 2.  7-11 days (half or more of days)  [] 3.  12-14 days (nearly every day   Poor appetite or overeating     [] 0. No  [] 1. Yes  [] 9. No Response [] 0. Never or one day  [] 1.  2-6 days (several days)  [] 2.  7-11 days (half or more of days)  [] 3.  12-14 days (nearly every day   Feeling bad about yourself - or that you are a failure or have let yourself or your family down   [] 0. No  [] 1. Yes  [] 9. No Response [] 0. Never or one day  [] 1.  2-6 days (several days)  [] 2.  7-11 days (half or more of days)  [] 3.  12-14 days (nearly every day   Trouble concentrating on things, such as reading the newspaper or watching television   [] 0. No  [] 1. Yes  [] 9. No Response [] 0. Never or one day  [] 1.  2-6 days (several days)  [] 2.  7-11 days (half or more of days)  [] 3.  12-14 days (nearly every day   Moving or speaking so slowly that other people could have noticed. Or the opposite- being so fidgety or restless that you have been moving around a lot more than usual.   [] 0. No  [] 1. Yes  [] 9. No Response [] 0. Never or one day  [] 1.  2-6 days (several days)  [] 2.  7-11 days (half or more of days)  [] 3.  12-14 days (nearly every day   Thoughts that you would be better off dead, or of hurting yourself in some way.   [] 0. No  [] 1. Yes  [] 9. No Response [] 0.   Never or one day  [] 1.  2-6 days (several days)  [] 2.  7-11 days (half or more of days)  [] 3.  12-14 days (nearly every day     Social Isolation  \"How often do you feel lonely or isolated

## 2023-08-08 NOTE — H&P
Both heels are clear. Sitting balance was good. Standing balance was poor. Lab Results   Component Value Date    WBC 10.1 10/14/2022    HGB 13.1 (L) 10/14/2022    HCT 39.7 (L) 10/14/2022    MCV 89.8 10/14/2022     10/14/2022     Lab Results   Component Value Date    INR 0.96 06/05/2013    INR 0.98 03/26/2012    INR 0.92 01/07/2012    PROTIME 9.3 06/05/2013    PROTIME 9.8 03/26/2012    PROTIME 9.2 01/07/2012     Lab Results   Component Value Date    CREATININE 1.0 12/07/2022    BUN 32 (H) 12/07/2022     12/07/2022    K 4.7 12/07/2022     12/07/2022    CO2 25 12/07/2022     Lab Results   Component Value Date    ALT 17 07/19/2022    AST 17 07/19/2022    ALKPHOS 95 07/19/2022    BILITOT 0.8 07/19/2022         Impression: 78-year-old male with a history of heart disease, A-fib, past stroke and hypertension with BPH who has undergone his second knee replacement at this calendar year (left this time). He has uncontrolled pain, a drop in hemoglobin and poor strength in the left leg. Strengths for the patient: His previous experience with rehab, his accessible home and his alertness. Limitations/barriers for the patient: His age, he lives alone and his ataxia from his prior stroke. Recommendation: Acute inpatient rehabilitation with occupational and physical therapy 180 minutes 5 out of every 7 days. Will address basic and  advancing mobility with self-care instruction and adaptive equipment training. Caregiver education will be offered. Expected length of stay  prior to a supervised level of function for discharge home with a walker and C OT/PT is 12 days. Additional recommendation:    Primary osteoarthritis of the left knee with joint replacement: The patient requires daily occupational and physical therapy.   Due to his significant poorly controlled pain, generalized weakness and ataxia from his prior stroke, he is at risk for fall with injury during standing ADLs and mobility change. A detailed plan of care will be established by hospital day 4 and I attest the patient is appropriate for inpatient rehabilitation at this time. I have compared the patient's current functional status noted during my history and physical with that of the preadmission screen and I have found no significant differences.

## 2023-08-08 NOTE — PLAN OF CARE
IRF days 12                                            Physician anticipated functional outcomes:  FWW and C OT/PT and supervision.    (NEW) Functional Prognosis: [x] Good       [] Fair     [] Guarded        Rehab Goals:   [] Return to premorbid function of_______________________________.    [] Independent   [] Mod I  [x] Supervision  [] CGA   [] Min A   [] Mod A  Level for ambulation []without assistive device  [x] with assistive device        [] Independent   [] Mod I  [x] Supervision [] CGA   [] Min A   [] Mod A  Level for transfers []without assistive device  [x] with assistive device         [] Independent   [] Mod I  [x] Supervision [] CGA   [] Min A   [] Mod A Level with ADL's []without assistive device   [x] with assistive device     ___________________     Level with cognitive skills requiring [] No assist [x] Supervision  [] Active Assist/Cues     [] Maximize level of mobility and ADL's to decrease burden on caregiver    IPOC brief synthesis of Preadmission Screen, Post-Admission Evaluation, and Therapy Evaluations: Acute inpatient rehabilitation with occupational and physical therapy 180 minutes 5 out of every 7 days. Will address basic and  advancing mobility with self-care instruction and adaptive equipment training. Caregiver education will be offered. Expected length of stay  prior to a supervised level of function for discharge home with a walker and [facility] OT/PT is 12 days. Additional recommendation:     Primary osteoarthritis of the left knee with joint replacement: The patient requires daily occupational and physical therapy. Due to his significant poorly controlled pain, generalized weakness and ataxia from his prior stroke, he is at risk for fall with injury during standing ADLs and mobility activities. Therefore, we must provide him adaptive equipment training with range of motion recovery, continue exercises and balance reeducation maneuvers.   We must monitor his incision for signs of infection. He needs DVT prophylaxis, pain management and aggressive pulmonary hygiene measures. Continuing to monitor his bowel bladder function with retraining as needed. Nutritional support. Outpatient follow-up with orthopedics in 2 weeks. DVT prophylaxis: The surgeon has elected to use a baby aspirin daily for DVT prophylaxis. SCDs will be used in bed as well. Weightbearing activities will be pursued daily. Uncontrolled pain: I will schedule his acetaminophen 4 times daily. Low-dose oxycodone will be available. Bowel intervention while on analgesics. We will emphasize cryotherapy, range of motion recovery and progressive mobilization. Hypertension: Continuing Norvasc, lisinopril and hydrochlorothiazide (substituted for his indapamide). Target systolic blood pressure is 120-140. Vital signs are checked at rest and with activity. We must encourage consistent oral hydration. Paroxysmal atrial fibrillation: Continuing Cordarone for rate control. He has not been on DOAC for some time. Daily weights to detect any decompensation of CHF. Heart healthy diet choices will be encouraged. Gentle diuresis with hydrochlorothiazide. CAD with prior stroke: Secondary prevention with DAPT (baby aspirin and Plavix) and a statin. Working on blood pressure control and heart rhythm regulation. BPH with urinary hesitancy: Avoiding anticholinergic medications. Bladder protocol will be ordered should his symptoms dictate this approach. Oral bowel intervention to avoid constipation complicating his emptying of the bladder. Anticipated discharge destination:    [] Home Independently   [x] Home with supervision    []SNF     [] Other     This plan has been reviewed with me in a language I understand.  I have had the opportunity to include my input with my therapy team.    ________________________________________________   ______________________  Patient/Significant Other      Date    I have reviewed this initial

## 2023-08-08 NOTE — PROGRESS NOTES
4 Eyes Skin Assessment     NAME:  [place] Woody  YOB: 1940  MEDICAL RECORD NUMBER:  [id number]    The patient is being assessed for  Admission    I agree that at least one RN has performed a thorough Head to Toe Skin Assessment on the patient. ALL assessment sites listed below have been assessed. Areas assessed by both nurses:    Head, Face, Ears, Shoulders, Back, Chest, Arms, Elbows, Hands, Sacrum. Buttock, Coccyx, Ischium, and Legs. Feet and Heels        Does the Patient have a Wound?  No noted wound(s)       Patrick Prevention initiated by RN: No  Wound Care Orders initiated by RN: No    Pressure Injury (Stage 3,4, Unstageable, DTI, NWPT, and Complex wounds) if present, place Wound referral order by RN under : No    New Ostomies, if present place, Ostomy referral order under : No     Nurse 1 eSignature: Electronically signed by Grady العلي RN on 8/8/23 at 2:39 PM EDT    **SHARE this note so that the co-signing nurse can place an eSignature**    Nurse 2 eSignature: Electronically signed by Radha Singh LPN on 4/4/22 at 2:03 PM EDT

## 2023-08-09 PROCEDURE — 97116 GAIT TRAINING THERAPY: CPT

## 2023-08-09 PROCEDURE — 97162 PT EVAL MOD COMPLEX 30 MIN: CPT

## 2023-08-09 PROCEDURE — 6370000000 HC RX 637 (ALT 250 FOR IP): Performed by: PHYSICAL MEDICINE & REHABILITATION

## 2023-08-09 PROCEDURE — 97110 THERAPEUTIC EXERCISES: CPT

## 2023-08-09 PROCEDURE — 97530 THERAPEUTIC ACTIVITIES: CPT

## 2023-08-09 PROCEDURE — 94150 VITAL CAPACITY TEST: CPT

## 2023-08-09 PROCEDURE — 97166 OT EVAL MOD COMPLEX 45 MIN: CPT

## 2023-08-09 PROCEDURE — 99232 SBSQ HOSP IP/OBS MODERATE 35: CPT | Performed by: PHYSICAL MEDICINE & REHABILITATION

## 2023-08-09 PROCEDURE — 94761 N-INVAS EAR/PLS OXIMETRY MLT: CPT

## 2023-08-09 PROCEDURE — 97535 SELF CARE MNGMENT TRAINING: CPT

## 2023-08-09 PROCEDURE — 1280000000 HC REHAB R&B

## 2023-08-09 RX ADMIN — OXYCODONE HYDROCHLORIDE 10 MG: 10 TABLET ORAL at 09:19

## 2023-08-09 RX ADMIN — OXYCODONE HYDROCHLORIDE 5 MG: 5 TABLET ORAL at 02:03

## 2023-08-09 RX ADMIN — OXYCODONE HYDROCHLORIDE 10 MG: 10 TABLET ORAL at 23:49

## 2023-08-09 RX ADMIN — HYDROCHLOROTHIAZIDE 12.5 MG: 25 TABLET ORAL at 10:27

## 2023-08-09 RX ADMIN — OXYCODONE HYDROCHLORIDE 10 MG: 10 TABLET ORAL at 16:58

## 2023-08-09 RX ADMIN — BISACODYL 5 MG: 5 TABLET, COATED ORAL at 10:43

## 2023-08-09 RX ADMIN — MULTIPLE VITAMINS W/ MINERALS TAB 1 TABLET: TAB at 10:28

## 2023-08-09 RX ADMIN — SENNOSIDES 8.6 MG: 8.6 TABLET, FILM COATED ORAL at 19:55

## 2023-08-09 RX ADMIN — LISINOPRIL 20 MG: 20 TABLET ORAL at 10:27

## 2023-08-09 RX ADMIN — DOCUSATE SODIUM 100 MG: 100 CAPSULE, LIQUID FILLED ORAL at 10:27

## 2023-08-09 RX ADMIN — ATORVASTATIN CALCIUM 40 MG: 40 TABLET, FILM COATED ORAL at 19:55

## 2023-08-09 RX ADMIN — ACETAMINOPHEN 650 MG: 325 TABLET ORAL at 04:28

## 2023-08-09 RX ADMIN — AMLODIPINE BESYLATE 5 MG: 5 TABLET ORAL at 10:26

## 2023-08-09 RX ADMIN — AMIODARONE HYDROCHLORIDE 100 MG: 200 TABLET ORAL at 10:27

## 2023-08-09 RX ADMIN — ACETAMINOPHEN 650 MG: 325 TABLET ORAL at 10:26

## 2023-08-09 RX ADMIN — CLOPIDOGREL BISULFATE 75 MG: 75 TABLET ORAL at 10:27

## 2023-08-09 RX ADMIN — ACETAMINOPHEN 650 MG: 325 TABLET ORAL at 19:55

## 2023-08-09 RX ADMIN — ASPIRIN 81 MG: 81 TABLET, CHEWABLE ORAL at 10:28

## 2023-08-09 ASSESSMENT — PAIN DESCRIPTION - LOCATION
LOCATION: KNEE

## 2023-08-09 ASSESSMENT — PAIN SCALES - GENERAL
PAINLEVEL_OUTOF10: 0
PAINLEVEL_OUTOF10: 8
PAINLEVEL_OUTOF10: 3
PAINLEVEL_OUTOF10: 8
PAINLEVEL_OUTOF10: 8
PAINLEVEL_OUTOF10: 7
PAINLEVEL_OUTOF10: 8
PAINLEVEL_OUTOF10: 4
PAINLEVEL_OUTOF10: 8

## 2023-08-09 ASSESSMENT — PAIN DESCRIPTION - DESCRIPTORS
DESCRIPTORS: DISCOMFORT
DESCRIPTORS: DULL
DESCRIPTORS: DISCOMFORT
DESCRIPTORS: ACHING

## 2023-08-09 ASSESSMENT — PAIN DESCRIPTION - FREQUENCY
FREQUENCY: CONTINUOUS

## 2023-08-09 ASSESSMENT — PAIN - FUNCTIONAL ASSESSMENT
PAIN_FUNCTIONAL_ASSESSMENT: PREVENTS OR INTERFERES SOME ACTIVE ACTIVITIES AND ADLS
PAIN_FUNCTIONAL_ASSESSMENT: PREVENTS OR INTERFERES SOME ACTIVE ACTIVITIES AND ADLS

## 2023-08-09 ASSESSMENT — PAIN DESCRIPTION - ONSET
ONSET: ON-GOING

## 2023-08-09 ASSESSMENT — PAIN DESCRIPTION - ORIENTATION
ORIENTATION: LEFT

## 2023-08-09 ASSESSMENT — PAIN DESCRIPTION - PAIN TYPE
TYPE: SURGICAL PAIN

## 2023-08-09 NOTE — PROGRESS NOTES
Comprehensive Nutrition Assessment    Type and Reason for Visit:  Initial, Consult (oral nutrition supplement)    Nutrition Recommendations/Plan:   Continue regular diet at this time  Will offer high protein oral nutrition supplement with breakfast   Will continue to follow up during stay      Malnutrition Assessment:  Malnutrition Status:  No malnutrition (08/09/23 1351)    Context:  Acute Illness       Nutrition Assessment:    Admit to acute rehab unit with pain, weakness, s/p left TKA. Able to tolerate regular diet. Reports good appetite at this time. Patient and spouse both report usually with very good intake. Willing to drink oral nutrition supplement with breakfast meal. No signficant weight changes in past year. Will follow at low nutrition risk at this time. Nutrition Related Findings:    sitting up in chair with spouse visiting, hx HTN, CAD, CHF   meds noted: multivitamin, senna, colace   per hx usually active exercise 3 days/wekk at [facility] Wound Type: Surgical Incision       Current Nutrition Intake & Therapies:    Average Meal Intake: % (per patient)  Average Supplements Intake: None Ordered  ADULT DIET; Regular    Anthropometric Measures:  Height: 5' 10\" (177.8 cm)  Ideal Body Weight (IBW): 166 lbs (75 kg)       Current Body Weight: 174 lb 6.1 oz (79.1 kg), 105.1 % IBW. Weight Source: Bed Scale  Current BMI (kg/m2): 25  Usual Body Weight: 175 lb (79.4 kg) (per hx 2022)  % Weight Change (Calculated): -0.4  Weight Adjustment For: No Adjustment                 BMI Categories: Overweight (BMI 25.0-29. 9)    Estimated Daily Nutrient Needs:  Energy Requirements Based On: Kcal/kg  Weight Used for Energy Requirements: Current  Energy (kcal/day): 9125-9844 (22-25 breanne/kg)  Weight Used for Protein Requirements: Current  Protein (g/day): 79-94 (1-1.2 g/kg)  Method Used for Fluid Requirements: 1 ml/kcal  Fluid (ml/day): 1900    Nutrition Diagnosis:   Increased nutrient needs related to other (comment) (knee surgery) as evidenced by wounds    Nutrition Interventions:   Food and/or Nutrient Delivery: Continue Current Diet, Start Oral Nutrition Supplement  Nutrition Education/Counseling: No recommendation at this time  Coordination of Nutrition Care: Continue to monitor while inpatient  Plan of Care discussed with: patient    Goals:     Goals: PO intake 75% or greater, by next RD assessment       Nutrition Monitoring and Evaluation:   Behavioral-Environmental Outcomes: None Identified  Food/Nutrient Intake Outcomes: Food and Nutrient Intake, Supplement Intake  Physical Signs/Symptoms Outcomes: Biochemical Data, Skin, Weight, Meal Time Behavior    Discharge Planning:    Continue current diet     Karishma Rosario RD, LD  Contact: [phone]

## 2023-08-09 NOTE — PROGRESS NOTES
08/09/23 1051   Encounter Summary   Encounter Overview/Reason  Initial Encounter; Encounter   Service Provided For: Patient   Referral/Consult From: [address] Family members   Last Encounter  08/09/23  (Neda Hugo: Patient had knee surgery and is having therapy to help him . Patient appreciated the services he has received from our team. HHe asked for [facility] communion. I prayed for him and gave him Holy Communion.)   Complexity of Encounter Low   Begin Time 1040   End Time  1055   Total Time Calculated 15 min   Spiritual/Emotional needs   Type Spiritual Support   Rituals, Rites and Sacraments   Type Sikhism Communion   Assessment/Intervention/Outcome   Assessment Calm;Peaceful; Hopeful   Intervention Active listening;Nurtured Hope;Prayer (assurance of)/Reader   Outcome Acceptance;Comfort;Coping;Peace; Encouraged;Engaged in conversation;Expressed Gratitude   Plan and Referrals   Plan/Referrals Continue to visit, (comment)

## 2023-08-09 NOTE — PROGRESS NOTES
Occupational Therapy                              [facility]    Chart Review:  Past Medical History:   Diagnosis Date    Arthritis     Back pain, chronic     BPH (benign prostatic hyperplasia)     CAD (coronary artery disease)     stents RCA, Cx, LAD    Cancer ([address]) 12/03/2012    skin cancer removed    Family history of coronary artery disease     H/O cardiovascular stress test 6/6/17, 8/18/20    EF 50-60%. Mild anterior and septal wall ischemia. H/O Doppler ultrasound 08/13/2019    No evidence of DVT or SVT. There appears to be calcified thrombus with in the right small saphenous vein. Significant reflux noted of the Right CFV 5.0s. H/O Doppler ultrasound 07/21/2022    Mild (0-49%) disease of bilateral proximal ICA. Normal vetebral flow. H/O echocardiogram 1/12; 8/13/2019    EF 50-55%. Diastolic Dysfunction Grade I . Sclerotic, but non-stenotic aortic valve. Aortic root dimension upper limits of normal 3.7cm.      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 Lobo Barajas at [address] N/A 07/26/2022    CHOLECYSTECTOMY LAPAROSCOPIC performed by Donnie Merino MD at [address] CATH LAB PROCEDURE   10/6/03;10/16/03;10/5/04;3/12    PTCA with stents; 3-12 cont med therapy    HERNIA REPAIR Left 10/14/2022    LEFT HERNIA INGUINAL REPAIR LAPAROSCOPIC ROBOTIC performed by Donnie Merino MD at [address] [facility] Right 05/08/2023     Social

## 2023-08-09 NOTE — PROGRESS NOTES
Physical Therapy    [facility] ARU PHYSICAL THERAPY EVALUATION    Chart Review:  Past Medical History:   Diagnosis Date    Arthritis     Back pain, chronic     BPH (benign prostatic hyperplasia)     CAD (coronary artery disease)     stents RCA, Cx, LAD    Cancer ([address]) 12/03/2012    skin cancer removed    Family history of coronary artery disease     H/O cardiovascular stress test 6/6/17, 8/18/20    EF 50-60%. Mild anterior and septal wall ischemia. H/O Doppler ultrasound 08/13/2019    No evidence of DVT or SVT. There appears to be calcified thrombus with in the right small saphenous vein. Significant reflux noted of the Right CFV 5.0s. H/O Doppler ultrasound 07/21/2022    Mild (0-49%) disease of bilateral proximal ICA. Normal vetebral flow. H/O echocardiogram 1/12; 8/13/2019    EF 50-55%. Diastolic Dysfunction Grade I . Sclerotic, but non-stenotic aortic valve. Aortic root dimension upper limits of normal 3.7cm.      Hyperlipidemia     Hypertension     Leg swelling 08/05/2019    PAF (paroxysmal atrial fibrillation) ([facility])     Palpitation 04/25/2022    S/P angioplasty with stent 10/6/03;10/16/03;10/5/04    stent RCA; stent CX; stent LAD 2003 & 2004    SOB (shortness of breath) 06/06/2017    Torsade de pointes 01/08/2012     Past Surgical History:   Procedure Laterality Date    ABDOMINAL HERNIA REPAIR      CATARACT REMOVAL      CHOLECYSTECTOMY, LAPAROSCOPIC  07/26/2022    CHOLECYSTECTOMY LAPAROSCOPIC performed by Dr. Ayush Ordoñez at [address] [facility] N/A 07/26/2022    CHOLECYSTECTOMY LAPAROSCOPIC performed by Emelie Merlin, MD at [address] [facility] [facility] CATH LAB PROCEDURE   10/6/03;10/16/03;10/5/04;3/12    PTCA with stents; 3-12 cont med therapy    HERNIA REPAIR Left 10/14/2022    LEFT HERNIA INGUINAL REPAIR LAPAROSCOPIC ROBOTIC performed by Emelie Merlin, MD at [address] [facility] Right 05/08/2023     Fall History: none   Social History: over level surface and >/=10 ft uneven surface using 2WW Mod Ind. Short Term Goal 4: Pt will ascend/descend curb step using 2WW and 1 flight of stairs using railings/railing + SPC prn Mod Ind following appropriate step-to pattern on ascent/descent  Short Term Goal 5: Pt will complete object retrieval from the floor with 2WW and using reacher prn Mod Ind.      Plan:    Requires PT Follow-Up: Yes  Pt will be seen at least 60 minutes per day for a minimum of 5 days per week, plus group therapy as appropriate  Physcial Therapy Plan  Current Treatment Recommendations: Strengthening, ROM, Balance training, Functional mobility training, Transfer training, Endurance training, Gait training, Stair training, Pain management, Home exercise program, Safety education & training, Patient/Caregiver education & training, Equipment evaluation, education, & procurement, Positioning, Therapeutic activities    PT Individual Minutes  Time In: 0830  Time Out: 1000  Minutes: 90        Timed Code Treatment Minutes: 75 Minutes    Number of Minutes/Billable Intervention      PT Evaluation 15   Gait Training 45   Therapeutic Exercise    Neuro Re-Ed    Therapeutic Activity 30   Wheelchair Propulsion    Group    Other:    TOTAL 90       Electronically signed by:    Mally Tidwell, PT  8/9/2023, 10:07

## 2023-08-09 NOTE — PROGRESS NOTES
Bib Salcedo    : 1940  Acct #: [de-identified]  MRN: [id number]              PM&R Progress Note      Admitting diagnosis: ***    Comorbid diagnoses impacting rehabilitation: ***    Chief complaint: ***    Prior (baseline) level of function: Independent.     Current level of function:         Current  IRF-CIRA and Goals:   Occupational Therapy:    Short Term Goals  Time Frame for Short Term Goals: STGs=LTGs :   Long Term Goals  Time Frame for Long Term Goals : ~7 days or until d/c  Long Term Goal 1: Pt will complete grooming tasks Ind  Long Term Goal 2: Pt will complete total body bathing mod I  Long Term Goal 3: Pt will complete UB dressing Ind  Long Term Goal 4: Pt will complete LB dressing mod I using AE PRN  Long Term Goal 5: Pt will doff/don footwear mod I using AE PRN  Additional Goals?: Yes  Long Term Goal 6: Pt will complete toileting mod I  Long Term Goal 7: Pt will complete functional transfers (bed, chair, toilet, shower) c DME PRN and mod I  Long Term Goal 8: Pt will perform therex/therax to facilitate increased strength/endurance/ax tolerance (c emphasis on dynamic standing balance/tolerance > 10 mins, BUE endurance) c SBA  Long Term Goal 9: Pt will complete simple homemaking tasks c DME PRN and mod I :                                       Eating: Eating  Assistance Needed: Independent  Comment: able to open packages, feed self  CARE Score: 6  Discharge Goal: Independent       Oral Hygiene: Oral Hygiene  Assistance Needed: Supervision or touching assistance  Comment: supervision in stance at sink  CARE Score: 4  Discharge Goal: Independent    UB/LB Bathing: Shower/Bathe Self  Assistance Needed: Supervision or touching assistance  Comment: supervision for full shower, majority performed seated  CARE Score: 4  Discharge Goal: Independent    UB Dressing: Upper Body Dressing  Assistance Needed: Supervision or touching assistance  Comment: to doff/don T shirt  CARE Score: 4  Discharge Goal:

## 2023-08-09 NOTE — PATIENT CARE CONFERENCE
ACUTE REHAB TEAM CONFERENCE SUMMARY   [facility]    NAME: Sinan Dixon  : 1940 ADMIT DATE: 2023    Rehab Admitting Dx:Primary osteoarthritis of left knee [M17.12]  Patient Comorbid Conditions: Active Hospital Problems    Diagnosis Date Noted    Primary osteoarthritis of left knee [M17.12] 2023    History of CVA (cerebrovascular accident) [Z86.73] 2023    Benign prostatic hyperplasia with urinary hesitancy [N40.1, R39.11] 2023    Coronary artery disease involving native coronary artery of native heart without angina pectoris [I25.10]      Date: 8/10/2023    CASE MANAGEMENT  Current issues/needs regarding patient and family discharge status: 2-level, bed/bath upstairs, laundry in basement, 4 SHANICE, 16 steps inside, Home DME: 2WW, standard walker, SC, cane  Patient and family goal: Home w/ spouse    PHYSICAL THERAPY (Updated in QI)  Short Term Goals  Time Frame for Short Term Goals: 7-10 tx days:  Short Term Goal 1: Pt will complete bed mobility (scooting, rolling R/L, and sup<->sit) Ind. Short Term Goal 2: Pt will complete OOB transfers using 2WW Mod Ind. Short Term Goal 3: Pt will ambulate >/=150 ft with turns over level surface and >/=10 ft uneven surface using 2WW Mod Ind. Short Term Goal 4: Pt will ascend/descend curb step using 2WW and 1 flight of stairs using railings/railing + SPC prn Mod Ind following appropriate step-to pattern on ascent/descent  Short Term Goal 5: Pt will complete object retrieval from the floor with 2WW and using reacher prn Mod Ind.           Impairments/deficits, barriers: pain   Body Structures, Functions, Activity Limitations Requiring Skilled Therapeutic Intervention: Decreased functional mobility , Decreased ADL status, Decreased ROM, Decreased strength, Decreased endurance, Decreased balance, Decreased high-level IADLs, Increased pain     Therapy Prognosis: Good  Decision Making: Medium Complexity  Clinical Presentation: to get basic needs met on unit  [] Pt will improve swallowing for safe diet advancement with use of strategies  []  Plan for discharge to home. [x]  Overall team goal being targeted this week: Pain will be managed to increase activity in tasks. Patient Strengths: Motivated, Cooperative, and Pleasant    Justification for Continued Stay  Based on my medical assessment of the patient and review of information from the interdisciplinary team as part of this weekly team conference, the patient continues to meet the following criteria for IRF level of care: The patient requires active and ongoing intervention of multiple therapy disciplines  The patient requires and intensive rehabilitation therapy program  The patient requires continued physician supervision by a rehabilitation physician  The patient requires 24 hours rehab nursing care  The patient requires an intensive and coordinated interdisciplinary team approach to the delivery of rehabilitative care. Assessment/Plan   [x]  The patient is making good progression towards their long term goals and is actively participating in and has a reasonable expectation to continue to benefit from the intensive rehabilitation therapy program   []  The estimated discharge date has been changed from initial team conference due to:   []  The estimated discharge destination has been changed from initial team conference due to:         Ongoing tx following discharge: [x]HHC  vs   [x]OUTPATIENT  PT   [x] No Further Treatment for OT     [] Family/Caregiver Training  []  Transitional Living Arrangement   [] Home Assessment (date  )     [] Family Conference   []  Therapeutic Pass       []  Other: (specify)    Estimated Discharge Date: 8/16/23    Estimated Discharge Destination: []home alone   []home alone with assist prn  []Continuous supervision [x]Return home with s/o/spouse/family   [] Assisted living    []SNF     Team members participating in today's conference.     [x] C

## 2023-08-09 NOTE — PLAN OF CARE
Problem: Discharge Planning  Goal: Discharge to home or other facility with appropriate resources  8/9/2023 0039 by Anant Jenkins RN  Outcome: Progressing  8/8/2023 1436 by Giovanna Mendoza RN  Outcome: Progressing     Problem: Pain  Goal: Verbalizes/displays adequate comfort level or baseline comfort level  8/9/2023 0039 by Anant Jenkins RN  Outcome: Progressing  8/8/2023 1436 by Giovanna Mendoza RN  Outcome: Progressing     Problem: ABCDS Injury Assessment  Goal: Absence of physical injury  8/9/2023 0039 by Anant Jenkins RN  Outcome: Progressing  8/8/2023 1436 by Giovanna Mendoza RN  Outcome: Progressing     Problem: Safety - Adult  Goal: Free from fall injury  8/9/2023 0039 by Anant Jenkins RN  Outcome: Progressing  8/8/2023 1436 by Giovanna Mendoza RN  Outcome: Progressing

## 2023-08-09 NOTE — CARE COORDINATION
Case Management Admission Note    Patient:Reggie Mckeon      EGK:2/70/1949  FGP:[id number]  Rehab Dx/Hx: Primary osteoarthritis of left knee [M17.12]    Chief Complaint:   Past Medical History:   Diagnosis Date    Arthritis     Back pain, chronic     BPH (benign prostatic hyperplasia)     CAD (coronary artery disease)     stents RCA, Cx, LAD    Cancer ([address]) 12/03/2012    skin cancer removed    Family history of coronary artery disease     H/O cardiovascular stress test 6/6/17, 8/18/20    EF 50-60%. Mild anterior and septal wall ischemia. H/O Doppler ultrasound 08/13/2019    No evidence of DVT or SVT. There appears to be calcified thrombus with in the right small saphenous vein. Significant reflux noted of the Right CFV 5.0s. H/O Doppler ultrasound 07/21/2022    Mild (0-49%) disease of bilateral proximal ICA. Normal vetebral flow. H/O echocardiogram 1/12; 8/13/2019    EF 50-55%. Diastolic Dysfunction Grade I . Sclerotic, but non-stenotic aortic valve. Aortic root dimension upper limits of normal 3.7cm.      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 Soto Callahan at [address] [place] [address] [address] N[address] 07/26/2022    CHOLECYSTECTOMY LAPAROSCOPIC performed by Irena Gonzalez MD at [address] [facility] [employer] [facility] CATH LAB PROCEDURE   10/6/03;10/16/03;10/5/04;3/12    PTCA with stents; 3-12 cont med therapy    HERNIA REPAIR Left 10/14/2022    LEFT HERNIA INGUINAL REPAIR LAPAROSCOPIC ROBOTIC performed by Yared Murray

## 2023-08-10 PROCEDURE — 97116 GAIT TRAINING THERAPY: CPT

## 2023-08-10 PROCEDURE — 97110 THERAPEUTIC EXERCISES: CPT

## 2023-08-10 PROCEDURE — 6370000000 HC RX 637 (ALT 250 FOR IP): Performed by: PHYSICAL MEDICINE & REHABILITATION

## 2023-08-10 PROCEDURE — 97530 THERAPEUTIC ACTIVITIES: CPT

## 2023-08-10 PROCEDURE — 1280000000 HC REHAB R&B

## 2023-08-10 PROCEDURE — 94761 N-INVAS EAR/PLS OXIMETRY MLT: CPT

## 2023-08-10 PROCEDURE — 99232 SBSQ HOSP IP/OBS MODERATE 35: CPT | Performed by: PHYSICAL MEDICINE & REHABILITATION

## 2023-08-10 RX ADMIN — BISACODYL 5 MG: 5 TABLET, COATED ORAL at 08:21

## 2023-08-10 RX ADMIN — LISINOPRIL 20 MG: 20 TABLET ORAL at 08:17

## 2023-08-10 RX ADMIN — DOCUSATE SODIUM 100 MG: 100 CAPSULE, LIQUID FILLED ORAL at 08:17

## 2023-08-10 RX ADMIN — ACETAMINOPHEN 650 MG: 325 TABLET ORAL at 05:38

## 2023-08-10 RX ADMIN — ACETAMINOPHEN 650 MG: 325 TABLET ORAL at 21:31

## 2023-08-10 RX ADMIN — CLOPIDOGREL BISULFATE 75 MG: 75 TABLET ORAL at 08:17

## 2023-08-10 RX ADMIN — SENNOSIDES 8.6 MG: 8.6 TABLET, FILM COATED ORAL at 21:31

## 2023-08-10 RX ADMIN — HYDROCHLOROTHIAZIDE 12.5 MG: 25 TABLET ORAL at 08:17

## 2023-08-10 RX ADMIN — ACETAMINOPHEN 650 MG: 325 TABLET ORAL at 13:14

## 2023-08-10 RX ADMIN — ASPIRIN 81 MG: 81 TABLET, CHEWABLE ORAL at 08:19

## 2023-08-10 RX ADMIN — ACETAMINOPHEN 650 MG: 325 TABLET ORAL at 17:48

## 2023-08-10 RX ADMIN — AMIODARONE HYDROCHLORIDE 100 MG: 200 TABLET ORAL at 08:17

## 2023-08-10 RX ADMIN — OXYCODONE HYDROCHLORIDE 10 MG: 10 TABLET ORAL at 09:40

## 2023-08-10 RX ADMIN — AMLODIPINE BESYLATE 5 MG: 5 TABLET ORAL at 08:19

## 2023-08-10 RX ADMIN — ATORVASTATIN CALCIUM 40 MG: 40 TABLET, FILM COATED ORAL at 21:31

## 2023-08-10 RX ADMIN — MULTIPLE VITAMINS W/ MINERALS TAB 1 TABLET: TAB at 08:17

## 2023-08-10 ASSESSMENT — PAIN DESCRIPTION - ORIENTATION
ORIENTATION: LEFT
ORIENTATION: LEFT

## 2023-08-10 ASSESSMENT — PAIN SCALES - GENERAL
PAINLEVEL_OUTOF10: 0
PAINLEVEL_OUTOF10: 6
PAINLEVEL_OUTOF10: 0
PAINLEVEL_OUTOF10: 0
PAINLEVEL_OUTOF10: 9
PAINLEVEL_OUTOF10: 2
PAINLEVEL_OUTOF10: 0
PAINLEVEL_OUTOF10: 2
PAINLEVEL_OUTOF10: 0
PAINLEVEL_OUTOF10: 7

## 2023-08-10 ASSESSMENT — PAIN DESCRIPTION - DESCRIPTORS
DESCRIPTORS: DISCOMFORT
DESCRIPTORS: ACHING

## 2023-08-10 ASSESSMENT — PAIN DESCRIPTION - LOCATION
LOCATION: KNEE
LOCATION: KNEE

## 2023-08-10 ASSESSMENT — PAIN DESCRIPTION - PAIN TYPE: TYPE: SURGICAL PAIN

## 2023-08-10 ASSESSMENT — PAIN DESCRIPTION - FREQUENCY: FREQUENCY: INTERMITTENT

## 2023-08-10 ASSESSMENT — PAIN DESCRIPTION - ONSET: ONSET: ON-GOING

## 2023-08-10 NOTE — PROGRESS NOTES
Physical Therapy    [x] daily progress note       [] discharge       Patient Name:  Phillip Tariq   :  1940 MRN: [id number]  Room:  [address] [place] [address] Date of Admission: 2023  Rehabilitation Diagnosis:   Primary osteoarthritis of left knee [M17.12]       Date 8/10/2023       Day of ARU Week:  3   Time IN/OUT 2888-9257   Individual Tx Minutes 60   Group Tx Minutes    Co-Treat Minutes    Concurrent Tx Minutes    TOTAL Tx Time Mins 60   Variance Time    Variance Time []   Refusal due to:     []   Medical hold/reason:    []   Illness   []   Off Unit for test/procedure  []   Extra time needed to complete task  []   Therapeutic need  []   Other (specify):   Restrictions Restrictions/Precautions  Restrictions/Precautions: Fall Risk, Weight Bearing, General Precautions (WBAT LLE)      Communication with other providers: [x]   OK to see per nursing:     []   Spoke with team member regarding:      Subjective observations and cognitive status: Pt sitting in recliner with  B le elevated and  ice pack on left knee  Following session pt stated \"I feel some encouragement today, you had me doing things I did not know I could do\".      Pain level/location:  1 with no wt bearing and 9 with wt bearing  /10       Location: all in the knee   Discharge recommendations  Anticipated discharge date:  TBD  Destination: []home alone   []home alone with assist PRN     [] home w/ family      [] Continuous supervision  []SNF    [] Assisted living     [] Other:   Continued therapy: []HHC PT  []OUTPATIENT  PT   [] No Further PT  Equipment needs: TBD         Bed Mobility:           [x]   Pt received out of bed       Transfers:    Sit--> Stand:  SBA  Stand --> Sit:   SBA  Assistive device required for transfer:   fww    Gait:    Distance:  203 ft, 150 ft, 120 ft   Assistance:  SBA  Device:  fww  Gait Quality:  slow pace, pt  flexing and extending knee on his own    Uneven Surfaces:       Assistance:    sba  Device:    fww  Surfaces Completed:

## 2023-08-10 NOTE — PROGRESS NOTES
Occupational Therapy    Physical Rehabilitation: OCCUPATIONAL THERAPY     [x] daily progress note       [] discharge       Patient Name:  Damien Garcia   :  1940 MRN: [id number]  Room:  [address] [place] [address] Date of Admission: 2023  Rehabilitation Diagnosis:   Primary osteoarthritis of left knee [M17.12]       Date 8/10/2023       Day of ARU Week:  3   Time IN/OUT 9083-9432  0964-4123   Individual Tx Minutes 70+50   Group Tx Minutes    Co-Treat Minutes    Concurrent Tx Minutes    TOTAL Tx Time Mins 120   Variance Time    Variance Time []   Refusal due to:     []   Medical hold/reason:    []   Illness   []   Off Unit for test/procedure  []   Extra time needed to complete task  []   Therapeutic need  []   Other (specify):   Restrictions Restrictions/Precautions: Fall Risk, Weight Bearing, General Precautions (WBAT LLE)         Communication with other providers: [x]   OK to see per nursing:     []   Spoke with team member regarding:      Subjective observations and cognitive status: AM: Pt sitting up in recliner on approach; pleasant and agreeable to therapy. Pt reported 9/10 pain in L knee. Nsg notified and administered pain meds. PM: Pt sitting up in recliner; pleasant and agreeable to therapy. 6/10 pain in L knee.       Pain level/location:    /10       Location:    Discharge recommendations  Anticipated discharge date:  23   Destination: []home alone   []home alone w assist prn   [x] home w/ family    [] Continuous supervision       []SNF    [] Assisted living     [] Other:   Continued therapy: []HHC OT  []OUTPATIENT  OT   [x] No Further OT  Equipment needs: TBD       Toileting: denied need     Toilet Transfers:   NA    Device Used:    []   Standard Toilet         []   Grab Bars           []  Bedside Commode       []   Elevated Toilet          []   Other:        Bed Mobility:           [x]   Pt received out of bed       Transfers:    Sit--> Stand:  CG/SBA   Stand --> Sit:   SBA  Stand-Pivot: No  Dependent Care Responsibility: No  Health Care Management: Primary (does not use pill box organizer)  Ambulation Assistance: Independent  Transfer Assistance: Independent  Active : Yes  Mode of Transportation: Car, SUV  Occupation: Retired  Type of Occupation: worked in paper [employer]  Additional Comments: sleeps in regular bed    Objective                                                                                    Goals:  (Update in navigator)  Short Term Goals  Time Frame for Short Term Goals: STGs=LTGs:  Long Term Goals  Time Frame for Long Term Goals : ~7 days or until d/c  Long Term Goal 1: Pt will complete grooming tasks Ind  Long Term Goal 2: Pt will complete total body bathing mod I  Long Term Goal 3: Pt will complete UB dressing Ind  Long Term Goal 4: Pt will complete LB dressing mod I using AE PRN  Long Term Goal 5: Pt will doff/don footwear mod I using AE PRN  Additional Goals?: Yes  Long Term Goal 6: Pt will complete toileting mod I  Long Term Goal 7: Pt will complete functional transfers (bed, chair, toilet, shower) c DME PRN and mod I  Long Term Goal 8: Pt will perform therex/therax to facilitate increased strength/endurance/ax tolerance (c emphasis on dynamic standing balance/tolerance > 10 mins, BUE endurance) c SBA  Long Term Goal 9: Pt will complete simple homemaking tasks c DME PRN and mod I:        Plan of Care                                                                              Times per week: 5 days per week for a minimum of 60 minutes/day plus group as appropriate for 60 minutes.   Treatment to include Occupational Therapy Plan  Current Treatment Recommendations: Balance training, Functional mobility training, Endurance training, Pain management, Safety education & training, Patient/Caregiver education & training, Equipment evaluation, education, & procurement, Self-Care / ADL, Home management training    Electronically signed by   SIRISHA Corona,  8/10/2023, 10:15 AM

## 2023-08-10 NOTE — CARE COORDINATION
LSW met with patient and spouse and provided written communication following Care Conference. LSW informed patient of recommendations for Outpatient PT. Provided list of Outpatient facilities. Patient chose [facility]. Patient verbalized understanding. Whiteboard updated. D/C Plan:  Estimated Date: Aug 16  DME:  has recommended DME  Outpatient:  PT ([address])  To:   Home with spouse (family will transport)

## 2023-08-11 PROCEDURE — 6370000000 HC RX 637 (ALT 250 FOR IP): Performed by: PHYSICAL MEDICINE & REHABILITATION

## 2023-08-11 PROCEDURE — 97116 GAIT TRAINING THERAPY: CPT

## 2023-08-11 PROCEDURE — 97110 THERAPEUTIC EXERCISES: CPT

## 2023-08-11 PROCEDURE — 99232 SBSQ HOSP IP/OBS MODERATE 35: CPT | Performed by: PHYSICAL MEDICINE & REHABILITATION

## 2023-08-11 PROCEDURE — 1280000000 HC REHAB R&B

## 2023-08-11 PROCEDURE — 94761 N-INVAS EAR/PLS OXIMETRY MLT: CPT

## 2023-08-11 PROCEDURE — 97535 SELF CARE MNGMENT TRAINING: CPT

## 2023-08-11 PROCEDURE — 97530 THERAPEUTIC ACTIVITIES: CPT

## 2023-08-11 PROCEDURE — 94150 VITAL CAPACITY TEST: CPT

## 2023-08-11 RX ADMIN — MULTIPLE VITAMINS W/ MINERALS TAB 1 TABLET: TAB at 08:35

## 2023-08-11 RX ADMIN — ACETAMINOPHEN 650 MG: 325 TABLET ORAL at 17:57

## 2023-08-11 RX ADMIN — AMIODARONE HYDROCHLORIDE 100 MG: 200 TABLET ORAL at 08:35

## 2023-08-11 RX ADMIN — BISACODYL 5 MG: 5 TABLET, COATED ORAL at 08:36

## 2023-08-11 RX ADMIN — LISINOPRIL 20 MG: 20 TABLET ORAL at 08:35

## 2023-08-11 RX ADMIN — AMLODIPINE BESYLATE 5 MG: 5 TABLET ORAL at 08:35

## 2023-08-11 RX ADMIN — ASPIRIN 81 MG: 81 TABLET, CHEWABLE ORAL at 08:35

## 2023-08-11 RX ADMIN — ACETAMINOPHEN 650 MG: 325 TABLET ORAL at 06:09

## 2023-08-11 RX ADMIN — ACETAMINOPHEN 650 MG: 325 TABLET ORAL at 21:29

## 2023-08-11 RX ADMIN — POLYETHYLENE GLYCOL (3350) 17 G: 17 POWDER, FOR SOLUTION ORAL at 06:11

## 2023-08-11 RX ADMIN — ACETAMINOPHEN 650 MG: 325 TABLET ORAL at 11:18

## 2023-08-11 RX ADMIN — ATORVASTATIN CALCIUM 40 MG: 40 TABLET, FILM COATED ORAL at 21:29

## 2023-08-11 RX ADMIN — DOCUSATE SODIUM 100 MG: 100 CAPSULE, LIQUID FILLED ORAL at 08:35

## 2023-08-11 RX ADMIN — SENNOSIDES 8.6 MG: 8.6 TABLET, FILM COATED ORAL at 21:29

## 2023-08-11 RX ADMIN — CLOPIDOGREL BISULFATE 75 MG: 75 TABLET ORAL at 08:36

## 2023-08-11 ASSESSMENT — PAIN SCALES - GENERAL
PAINLEVEL_OUTOF10: 6
PAINLEVEL_OUTOF10: 0
PAINLEVEL_OUTOF10: 1
PAINLEVEL_OUTOF10: 2
PAINLEVEL_OUTOF10: 0
PAINLEVEL_OUTOF10: 0

## 2023-08-11 ASSESSMENT — PAIN DESCRIPTION - ORIENTATION
ORIENTATION: LEFT

## 2023-08-11 ASSESSMENT — PAIN DESCRIPTION - LOCATION
LOCATION: KNEE
LOCATION: OTHER (COMMENT)
LOCATION: KNEE

## 2023-08-11 ASSESSMENT — PAIN DESCRIPTION - DESCRIPTORS
DESCRIPTORS: ACHING

## 2023-08-11 ASSESSMENT — PAIN - FUNCTIONAL ASSESSMENT: PAIN_FUNCTIONAL_ASSESSMENT: PREVENTS OR INTERFERES SOME ACTIVE ACTIVITIES AND ADLS

## 2023-08-11 NOTE — PROGRESS NOTES
Darius Urena    : 1940  Acct #: [de-identified]  MRN: [id number]              PM&R Progress Note      Admitting diagnosis: Primary osteoarthritis of the left knee ([place] [address])     Comorbid diagnoses impacting rehabilitation: Uncontrolled pain, generalized weakness, gait disturbance, acute blood loss anemia, paroxysmal atrial fibrillation, CAD, essential hypertension, chronic diastolic congestive heart failure, BPH with urinary hesitancy, history of CVA     Chief complaint: Pain and stiffness of the left knee. He is worried about constipation with narcotic use. Prior (baseline) level of function: Independent.     Current level of function:         Current  IRF-CIRA and Goals:   Occupational Therapy:    Short Term Goals  Time Frame for Short Term Goals: STGs=LTGs :   Long Term Goals  Time Frame for Long Term Goals : ~7 days or until d/c  Long Term Goal 1: Pt will complete grooming tasks Ind  Long Term Goal 2: Pt will complete total body bathing mod I  Long Term Goal 3: Pt will complete UB dressing Ind  Long Term Goal 4: Pt will complete LB dressing mod I using AE PRN  Long Term Goal 5: Pt will doff/don footwear mod I using AE PRN  Additional Goals?: Yes  Long Term Goal 6: Pt will complete toileting mod I  Long Term Goal 7: Pt will complete functional transfers (bed, chair, toilet, shower) c DME PRN and mod I  Long Term Goal 8: Pt will perform therex/therax to facilitate increased strength/endurance/ax tolerance (c emphasis on dynamic standing balance/tolerance > 10 mins, BUE endurance) c SBA  Long Term Goal 9: Pt will complete simple homemaking tasks c DME PRN and mod I :                                       Eating: Eating  Assistance Needed: Independent  Comment: able to open packages, feed self  CARE Score: 6  Discharge Goal: Independent       Oral Hygiene: Oral Hygiene  Assistance Needed: Supervision or touching assistance  Comment: supervision in stance at sink  CARE Score: 4  Discharge Goal: Side of Bed  Assistance Needed: Supervision or touching assistance  Comment: SBA in regular bed (pt initiated self-assisting LLE using hand)  CARE Score: 4  Discharge Goal: Independent    Transfers:    Sit to Stand  Assistance Needed: Supervision or touching assistance  Comment: CGA with 2WW  CARE Score: 4  Discharge Goal: Independent  Chair/Bed-to-Chair Transfer  Assistance Needed: Supervision or touching assistance  Comment: CGA with 2WW  CARE Score: 4  Discharge Goal: Independent     Car Transfer  Assistance Needed: Partial/moderate assistance  Comment: Min A to sit, CGA to stand with 2WW; pt self-assisted LLE with 1 hand to get it in and out of car  CARE Score: 3  Discharge Goal: Independent    Ambulation:    Walking Ability  Does the Patient Walk?: Yes     Walk 10 Feet  Assistance Needed: Supervision or touching assistance  Physical Assistance Level: Less than 25%  Comment: CGA using 2WW  CARE Score: 4  Discharge Goal: Independent     Walk 50 Feet with Two Turns  Assistance Needed: Supervision or touching assistance  Comment: CGA using 2WW  CARE Score: 4  Discharge Goal: Independent     Walk 150 Feet  Comment: max tolerance today was 100 ft using 2WW with CGA (limited by L knee pain: 8/10)  Reason if not Attempted: Not attempted due to medical condition or safety concerns  CARE Score: 88  Discharge Goal: Independent     Walking 10 Feet on Uneven Surfaces  Assistance Needed: Supervision or touching assistance  Comment: CGA using 2WW over concrete and outdoor ramp  CARE Score: 4  Discharge Goal: Independent     1 Step (Curb)  Assistance Needed: Supervision or touching assistance  Comment: CGA using 2WW (pt able to carry out appropriate step sequencing on ascent/descent of outdoor curb step)  CARE Score: 4  Discharge Goal: Independent     4 Steps  Assistance Needed: Supervision or touching assistance  Comment: CGA using railings following appropriate step-to pattern on ascent/descent of 4\"/6\" step height  CARE

## 2023-08-11 NOTE — PROGRESS NOTES
Physical Therapy  [x] daily progress note       [] discharge       Patient Name:  Brooke Villar   :  1940 MRN: [id number]  Room:  [address] [place] [address] Date of Admission: 2023  Rehabilitation Diagnosis:   Primary osteoarthritis of left knee [M17.12]       Date 2023       Day of ARU Week:  4   Time IN/OUT 1044/1200, 1445/1529   Individual Tx Minutes 76/44   Group Tx Minutes    Co-Treat Minutes    Concurrent Tx Minutes    TOTAL Tx Time Mins 120   Variance Time    Variance Time []   Refusal due to:     []   Medical hold/reason:    []   Illness   []   Off Unit for test/procedure  []   Extra time needed to complete task  []   Therapeutic need  []   Other (specify):   Restrictions Restrictions/Precautions  Restrictions/Precautions: Fall Risk, Weight Bearing, General Precautions (WBAT LLE)      Interdisciplinary communication [x]   Cleared for therapy per nursing     [x]   RN notified about issues during session-pt wanted tylenol  []   RN updated on pt performance  []   Spoke with   []   Spoke with OT  []   Spoke with MD  []   Other:    Subjective observations and cognitive status: AM: Upon entering, pt in bathroom with aide. Took over for aide and assisted with bathroom mobility to begin the session.    PM: pt in recliner, agreeable to therapy    Provided ice at end of both sessions   Pain level/location:   6 /10       Location: L knee   Discharge recommendations  Anticipated discharge date:    Destination: []home alone   []home alone with assist PRN     [x] home w/ family      [] Continuous supervision  []SNF    [] Assisted living     [] Other:  Continued therapy: []C PT  [x]OUTPATIENT  PT   [] No Further PT  []SNF PT  Caregiver training recommended: []Yes  [] No   Equipment needs: pt has 2ww     Bed Mobility:           []   Pt received out of bed   Lying --> Sit:  SBA with effort to get LLE in bed  Sit --> lying:  SBA  Bed features used: [] Yes  [x] No       Transfers:    Sit--> Stand: comparing this knee recovery to his last one(which was exceptionally fast and easy). Assured pt that he is in a typical recovery track for s/p TKR  [x]   Use of call light for assist   []   Wheelchair mobility/management  [x]   HEP provided and explained   [x]   Treatment plan reviewed  []   Home safety  []   Body mechanics  [x]   Positioning  [x]   Bed Mobility/Transfer technique  [x]   Gait technique/sequencing  []   Proper use of assistive device/adaptive equipment  [x]   Stair training/Advanced mobility safety and technique  []   Reinforced patient's precautions/mobility while maintaining precautions  [x]   Postural awareness  []   Family/caregiver training  []   Progress was updated and reviewed in Rehabtracker with patient and/or family this date. []   Other:      Treatment Plan for Next Session: 12 steps of 6\" height, beginning to train on stairs with one rail and cane as at home. Assessment:   Assessment: This pt demonstrated a positive  response to today's treatment as evidenced by progression to full flight of stairs. The patient is making  progress toward established goals as evidenced by QI scores. Ongoing deficits are observed in the areas of TKE and continued focus on strength and ROM is recommended.        Treatment/Activity Tolerance:   [] Tolerated treatment with no adverse effects    [x] Patient limited by fatigue  [x] Patient limited by pain   [] Patient limited by medical complications:    [] Adverse reaction to Tx:   [] Significant change in status    Barrier/s to progress/learning:   [x]   None  []   Cognition  []   Hearing deficit  []   Pre-morbid mental/psychological status   []   Motivation  []   Communication  []   Anxiety  []   Vision deficit  []   Attention  []   Other:      Safety:       []  bed alarm set    [x]  chair alarm set    []  Pt refused alarms                []  Telesitter activated      [x]  Gait belt used during tx session      []other:         Number of

## 2023-08-11 NOTE — PROGRESS NOTES
Occupational Therapy    Physical Rehabilitation: OCCUPATIONAL THERAPY     [x] daily progress note       [] discharge       Patient Name:  Skyler Bergeron   :  1940 MRN: [id number]  Room:  [address] [place] [address] Date of Admission: 2023  Rehabilitation Diagnosis:   Primary osteoarthritis of left knee [M17.12]       Date 2023       Day of ARU Week:  4   Time IN//1035   Individual Tx Minutes 60   Group Tx Minutes    Co-Treat Minutes    Concurrent Tx Minutes    TOTAL Tx Time Mins 60   Variance Time    Variance Time []   Refusal due to:     []   Medical hold/reason:    []   Illness   []   Off Unit for test/procedure  []   Extra time needed to complete task  []   Therapeutic need  []   Other (specify):   Restrictions Restrictions/Precautions: Fall Risk, Weight Bearing, General Precautions (WBAT LLE)         Communication with other providers: [x]   OK to see per nursing:     []   Spoke with team member regarding:      Subjective observations and cognitive status: Pt in recliner on approach, reported not feeling great because he needs to have a BM. Pt then was successful having a BM and agreeable to a full shower.       Pain level/location:    5/10       Location: L knee   Discharge recommendations  Anticipated discharge date:    Destination: []home alone   []home alone w assist prn   [x] home w/ family    [] Continuous supervision       []SNF    [] Assisted living     [] Other:   Continued therapy: []HHC OT  []OUTPATIENT  OT   [x] No Further OT  Equipment needs: None       ADLs:      Oral Hygiene: Oral Hygiene  Assistance Needed: Supervision or touching assistance  Comment: supervision in stance at sink  CARE Score: 4  Discharge Goal: Independent    UB/LB Bathing: Shower/Bathe Self  Assistance Needed: Supervision or touching assistance  Comment: supervision for full shower; seated for majority  CARE Score: 4  Discharge Goal: Independent    UB Dressing: Upper Body Dressing  Assistance Needed: Setup or (reps/sets): To increase BUE endurance for ADLs and transfers pt completed 4 sets x10 reps on rickshaw c 25#   []   Standing Ther Ex (reps/sets):     []   Other:      Comments: All intervention performed to increase pt's endurance, ax tolerance, balance, and I c ADLs/IADLs and functional transfers/mobility. Patient/Caregiver Education and Training:   []   [employer] Equipment Use  [x]   Bed Mobility/Transfer Technique/Safety  []   Energy Conservation Tips  []   Family training  [x]   Postural Awareness  [x]   Safety During Functional Activities  []   Reinforced Patient's Precautions   []   Progress was updated and reviewed in Rehabtracker with patient and/or family this         date. Treatment Plan for Next Session: Continue OT POC       Assessment: This pt demonstrated a positive response to today's treatment as evidenced by managing footwear with less effort. The patient is making progress toward established goals as evidenced by QI scores. Ongoing deficits are observed in the areas of endurance, pain and continued focus on this is recommended.        Treatment/Activity Tolerance:   [x] Tolerated treatment with no adverse effects    [] Patient limited by fatigue  [] Patient limited by pain   [] Patient limited by medical complications:    [] Adverse reaction to Tx:   [] Significant change in status    Safety:       []  bed alarm set    [x]  chair alarm set    []  Pt refused alarms                []  Telesitter activated      [x]  Gait belt used during tx session      []other:       Number of Minutes/Billable Intervention  Therapeutic Exercise 15   ADL Self-care 45   Neuro Re-Ed    Therapeutic Activity    Group    Other:    TOTAL 60       Social History  Social/Functional History  Lives With: Spouse Natalio Ramos (has health limitations); has a son who lives locally and can assist prn)  Type of Home: House  Home Layout: Two level, Bed/Bath upstairs, Laundry in basement (~16 with L ascending rail; 1 flight of

## 2023-08-12 PROCEDURE — 97535 SELF CARE MNGMENT TRAINING: CPT

## 2023-08-12 PROCEDURE — 1280000000 HC REHAB R&B

## 2023-08-12 PROCEDURE — 97116 GAIT TRAINING THERAPY: CPT

## 2023-08-12 PROCEDURE — 97530 THERAPEUTIC ACTIVITIES: CPT

## 2023-08-12 PROCEDURE — 97110 THERAPEUTIC EXERCISES: CPT

## 2023-08-12 PROCEDURE — 6370000000 HC RX 637 (ALT 250 FOR IP): Performed by: PHYSICAL MEDICINE & REHABILITATION

## 2023-08-12 PROCEDURE — 97112 NEUROMUSCULAR REEDUCATION: CPT

## 2023-08-12 RX ORDER — LANOLIN ALCOHOL/MO/W.PET/CERES
3 CREAM (GRAM) TOPICAL NIGHTLY PRN
Status: DISCONTINUED | OUTPATIENT
Start: 2023-08-12 | End: 2023-08-16 | Stop reason: HOSPADM

## 2023-08-12 RX ADMIN — Medication 3 MG: at 20:07

## 2023-08-12 RX ADMIN — AMIODARONE HYDROCHLORIDE 100 MG: 200 TABLET ORAL at 09:38

## 2023-08-12 RX ADMIN — SENNOSIDES 8.6 MG: 8.6 TABLET, FILM COATED ORAL at 20:08

## 2023-08-12 RX ADMIN — ATORVASTATIN CALCIUM 40 MG: 40 TABLET, FILM COATED ORAL at 20:07

## 2023-08-12 RX ADMIN — ACETAMINOPHEN 650 MG: 325 TABLET ORAL at 06:30

## 2023-08-12 RX ADMIN — DOCUSATE SODIUM 100 MG: 100 CAPSULE, LIQUID FILLED ORAL at 09:38

## 2023-08-12 RX ADMIN — ASPIRIN 81 MG: 81 TABLET, CHEWABLE ORAL at 09:38

## 2023-08-12 RX ADMIN — CLOPIDOGREL BISULFATE 75 MG: 75 TABLET ORAL at 09:38

## 2023-08-12 RX ADMIN — AMLODIPINE BESYLATE 5 MG: 5 TABLET ORAL at 09:38

## 2023-08-12 RX ADMIN — ACETAMINOPHEN 650 MG: 325 TABLET ORAL at 20:07

## 2023-08-12 RX ADMIN — ACETAMINOPHEN 650 MG: 325 TABLET ORAL at 11:47

## 2023-08-12 RX ADMIN — ACETAMINOPHEN 650 MG: 325 TABLET ORAL at 16:25

## 2023-08-12 RX ADMIN — MULTIPLE VITAMINS W/ MINERALS TAB 1 TABLET: TAB at 09:38

## 2023-08-12 RX ADMIN — OXYCODONE HYDROCHLORIDE 10 MG: 10 TABLET ORAL at 20:10

## 2023-08-12 ASSESSMENT — PAIN SCALES - GENERAL
PAINLEVEL_OUTOF10: 5
PAINLEVEL_OUTOF10: 0
PAINLEVEL_OUTOF10: 7
PAINLEVEL_OUTOF10: 3
PAINLEVEL_OUTOF10: 0

## 2023-08-12 ASSESSMENT — PAIN DESCRIPTION - PAIN TYPE: TYPE: SURGICAL PAIN

## 2023-08-12 ASSESSMENT — PAIN - FUNCTIONAL ASSESSMENT: PAIN_FUNCTIONAL_ASSESSMENT: PREVENTS OR INTERFERES SOME ACTIVE ACTIVITIES AND ADLS

## 2023-08-12 ASSESSMENT — PAIN DESCRIPTION - LOCATION
LOCATION: KNEE

## 2023-08-12 ASSESSMENT — PAIN DESCRIPTION - ORIENTATION
ORIENTATION: LEFT

## 2023-08-12 ASSESSMENT — PAIN DESCRIPTION - DESCRIPTORS: DESCRIPTORS: ACHING

## 2023-08-12 ASSESSMENT — PAIN DESCRIPTION - FREQUENCY: FREQUENCY: INTERMITTENT

## 2023-08-12 ASSESSMENT — PAIN DESCRIPTION - ONSET: ONSET: ON-GOING

## 2023-08-12 NOTE — PROGRESS NOTES
Physical Therapy  [x] daily progress note       [] discharge       Patient Name:  Cindy Castillo   :  1940 MRN: [id number]  Room:  [address] [place] [address] Date of Admission: 2023  Rehabilitation Diagnosis:   Primary osteoarthritis of left knee [M17.12]       Date 2023       Day of ARU Week:  5   Time IN/OUT 0022-9077   Individual Tx Minutes 60   Group Tx Minutes    Co-Treat Minutes    Concurrent Tx Minutes    TOTAL Tx Time Mins 60   Variance Time    Variance Time []   Refusal due to:     []   Medical hold/reason:    []   Illness   []   Off Unit for test/procedure  []   Extra time needed to complete task  []   Therapeutic need  []   Other (specify):   Restrictions Restrictions/Precautions  Restrictions/Precautions: Fall Risk, Weight Bearing, General Precautions (WBAT LLE)      Communication with other providers: [x]   OK to see per nursing:     []   Spoke with team member regarding:      Subjective observations and cognitive status: Upon arrival pt sitting up in recliner.  Pt agreeable to tx     Pain level/location:   2 /10       Location:  L knee   Discharge recommendations  Anticipated discharge date:    Destination: []home alone   []home alone with assist PRN     [x] home w/ family      [] Continuous supervision  []SNF    [] Assisted living     [] Other:  Continued therapy: []HHC PT  [x]OUTPATIENT  PT   [] No Further PT  []SNF PT  Caregiver training recommended: []Yes  [] No   Equipment needs: 2ww         Bed Mobility:           [x]   Pt received out of bed       Transfers:    Sit--> Stand:  Supervision  Stand --> Sit:   Supervision     Assistive device required for transfer:   RW    Gait:    Distance:  253 ft, 192 ft , 187 ft  Assistance:  SBA  Device:  RW  Gait Quality:  Pt demo step through pattern with 25% cues for B heel strike throughout gait cylce        Stairs   # Completed:  Flight  Assistance:  SBA  Supportive Device:  1 HR and cane    Uneven Surfaces:       Assistance:    SBA  Device: treatment with no adverse effects    [] Patient limited by fatigue  [] Patient limited by pain   [] Patient limited by medical complications:    [] Adverse reaction to Tx:   [] Significant change in status    Safety:       []  bed alarm set    [x]  chair alarm set    []  Pt refused alarms                []  Telesitter activated      [x]  Gait belt used during tx session      []other:         Number of Minutes/Billable Intervention  Gait Training 15   Therapeutic Exercise 15   Neuro Re-Ed 15   Therapeutic Activity 15   Wheelchair Propulsion    Group    Other:    TOTAL 60         Social History  Social/Functional History  Lives With: Spouse Uziel Colon (has health limitations); has a son who lives locally and can assist prn)  Type of Home: House  Home Layout: Two level, Bed/Bath upstairs, Laundry in basement (~16 with L ascending rail; 1 flight of stairs with R descending rail)  Home Access: Stairs to enter with rails  Entrance Stairs - Number of Steps: ~4  Entrance Stairs - Rails: Right  Bathroom Shower/Tub: Tub/Shower unit, Shower chair without back  Bathroom Toilet: Handicap height  Bathroom Equipment: Shower chair  Bathroom Accessibility: Walker accessible  Home Equipment: Walker, standard, Walker, rolling, Cane  Has the patient had two or more falls in the past year or any fall with injury in the past year?: No  ADL Assistance: Independent  Homemaking Assistance: Independent  Homemaking Responsibilities: Yes  Meal Prep Responsibility: Secondary  Laundry Responsibility: Primary (shared with spouse)  Cleaning Responsibility: Secondary  Bill Paying/Finance Responsibility: Primary (shared with spouse)  Shopping Responsibility: No  Dependent Care Responsibility: No  Health Care Management: Primary (does not use pill box organizer)  Ambulation Assistance: Independent  Transfer Assistance: Independent  Active : Yes  Mode of Transportation: Car, SUV  Occupation: Retired  Type of Occupation: worked in paper

## 2023-08-12 NOTE — PROGRESS NOTES
Occupational Therapy  Physical Rehabilitation: OCCUPATIONAL THERAPY     [x] daily progress note       [] discharge       Patient Name:  Army Thacker   :  1940 MRN: [id number]  Room:  [address] [place] [address] Date of Admission: 2023  Rehabilitation Diagnosis:   Primary osteoarthritis of left knee [M17.12]       Date 2023       Day of ARU Week:  5   Time IN/OUT 5349-9317  6832-2783   Individual Tx Minutes 60   Group Tx Minutes 0   Co-Treat Minutes 0   Concurrent Tx Minutes 0   TOTAL Tx Time Mins 60   Variance Time 0   Variance Time []   Refusal due to:     []   Medical hold/reason:    []   Illness   []   Off Unit for test/procedure  []   Extra time needed to complete task  []   Therapeutic need  []   Other (specify):   Restrictions Restrictions/Precautions  Restrictions/Precautions: Fall Risk, Weight Bearing, General Precautions (WBAT LLE)      Communication with other providers: [x]   OK to see per nursing:     []   Spoke with team member regarding:      Subjective observations and cognitive status: \"I can't believe how this knee is not doing as well as the other knee\"     Pain level/location:    -3/10       Location: L knee   Discharge recommendations  Anticipated discharge date:    Destination: []home alone   []home alone w assist prn   [x] home w/ family    [] Continuous supervision       []SNF    [] Assisted living     [] Other:   Continued therapy: []HHC OT  []OUTPATIENT  OT   [x] No Further OT  Equipment needs: None   (HIT F2 to transition between stars)    Grooming: Mod I to brush teeth standing at sink    Toileting:   SBA with clothing management and toilet hygiene     Footwear: Pt able to don B shoes with Mod I.       Toilet Transfers:   SBA  Device Used:    [x]   Standard Toilet         [x]   Grab Bars           []  Bedside Commode       []   Elevated Toilet          []   Other:             Bed Mobility:           [x]   Pt received out of bed       Transfers:    Sit--> Stand:  CGA  Stand -->

## 2023-08-12 NOTE — PROGRESS NOTES
Physical Therapy  [x] daily progress note       [] discharge       Patient Name:  Mami Hoskins   :  1940 MRN: [id number]  Room:  [address] [place] [address] Date of Admission: 2023  Rehabilitation Diagnosis:   Primary osteoarthritis of left knee [M17.12]       Date 2023       Day of ARU Week:  5   Time IN//930   Individual Tx Minutes 60   Group Tx Minutes    Co-Treat Minutes    Concurrent Tx Minutes    TOTAL Tx Time Mins 60   Variance Time    Variance Time []   Refusal due to:     []   Medical hold/reason:    []   Illness   []   Off Unit for test/procedure  []   Extra time needed to complete task  []   Therapeutic need  []   Other (specify):   Restrictions Restrictions/Precautions  Restrictions/Precautions: Fall Risk, Weight Bearing, General Precautions (WBAT LLE)      Interdisciplinary communication [x]   Cleared for therapy per nursing     []   RN notified about issues during session  []   RN updated on pt performance  []   Spoke with   []   Spoke with OT  []   Spoke with MD  []   Other:    Subjective observations and cognitive status: Pt in recliner, states he did not sleep well last night, but is agreeable to therapy.       Pain level/location:  6  /10       Location: L knee   Discharge recommendations  Anticipated discharge date:    Destination: []home alone   []home alone with assist PRN     [x] home w/ family      [] Continuous supervision  []SNF    [] Assisted living     [] Other:  Continued therapy: []HHC PT  [x]OUTPATIENT  PT   [] No Further PT  []SNF PT  Caregiver training recommended: []Yes  [] No   Equipment needs: 2ww     Bed Mobility:           []   Pt received out of bed   Lying --> Sit:  mod I  Sit --> lying:  mod I  Bed features used: [x] Yes  [] No       Transfers:    Sit--> Stand:  supervision with smooth movement  Stand --> Sit:   supervision  Chair-->Bed/Bed --> Chair:   supervision   Car Transfers:  supervision with self assist of LLE in/out of car with min training, Functional mobility training, Transfer training, Endurance training, Gait training, Stair training, Pain management, Home exercise program, Safety education & training, Patient/Caregiver education & training, Equipment evaluation, education, & procurement, Positioning, Therapeutic activities    Electronically signed by   Paulo Cobb PT,  8/12/2023, 8:25 AM

## 2023-08-13 VITALS
WEIGHT: 177.25 LBS | BODY MASS INDEX: 25.38 KG/M2 | TEMPERATURE: 97.8 F | DIASTOLIC BLOOD PRESSURE: 54 MMHG | RESPIRATION RATE: 18 BRPM | SYSTOLIC BLOOD PRESSURE: 145 MMHG | HEIGHT: 70 IN | HEART RATE: 68 BPM | OXYGEN SATURATION: 97 %

## 2023-08-13 PROCEDURE — 94761 N-INVAS EAR/PLS OXIMETRY MLT: CPT

## 2023-08-13 PROCEDURE — 6370000000 HC RX 637 (ALT 250 FOR IP): Performed by: PHYSICAL MEDICINE & REHABILITATION

## 2023-08-13 PROCEDURE — 94150 VITAL CAPACITY TEST: CPT

## 2023-08-13 PROCEDURE — 1280000000 HC REHAB R&B

## 2023-08-13 RX ADMIN — ACETAMINOPHEN 650 MG: 325 TABLET ORAL at 05:21

## 2023-08-13 RX ADMIN — AMLODIPINE BESYLATE 5 MG: 5 TABLET ORAL at 10:18

## 2023-08-13 RX ADMIN — CLOPIDOGREL BISULFATE 75 MG: 75 TABLET ORAL at 10:18

## 2023-08-13 RX ADMIN — ASPIRIN 81 MG: 81 TABLET, CHEWABLE ORAL at 10:18

## 2023-08-13 RX ADMIN — LISINOPRIL 20 MG: 20 TABLET ORAL at 10:18

## 2023-08-13 RX ADMIN — Medication 3 MG: at 19:57

## 2023-08-13 RX ADMIN — ACETAMINOPHEN 650 MG: 325 TABLET ORAL at 19:57

## 2023-08-13 RX ADMIN — MULTIPLE VITAMINS W/ MINERALS TAB 1 TABLET: TAB at 10:18

## 2023-08-13 RX ADMIN — ACETAMINOPHEN 650 MG: 325 TABLET ORAL at 15:50

## 2023-08-13 RX ADMIN — HYDROCHLOROTHIAZIDE 12.5 MG: 25 TABLET ORAL at 10:18

## 2023-08-13 RX ADMIN — DOCUSATE SODIUM 100 MG: 100 CAPSULE, LIQUID FILLED ORAL at 10:19

## 2023-08-13 RX ADMIN — AMIODARONE HYDROCHLORIDE 100 MG: 200 TABLET ORAL at 10:18

## 2023-08-13 RX ADMIN — ATORVASTATIN CALCIUM 40 MG: 40 TABLET, FILM COATED ORAL at 19:57

## 2023-08-13 RX ADMIN — ACETAMINOPHEN 650 MG: 325 TABLET ORAL at 10:19

## 2023-08-13 RX ADMIN — SENNOSIDES 8.6 MG: 8.6 TABLET, FILM COATED ORAL at 19:57

## 2023-08-13 ASSESSMENT — PAIN SCALES - GENERAL
PAINLEVEL_OUTOF10: 3
PAINLEVEL_OUTOF10: 3
PAINLEVEL_OUTOF10: 0
PAINLEVEL_OUTOF10: 0
PAINLEVEL_OUTOF10: 4
PAINLEVEL_OUTOF10: 4

## 2023-08-13 ASSESSMENT — PAIN DESCRIPTION - DESCRIPTORS
DESCRIPTORS: DULL;DISCOMFORT
DESCRIPTORS: DULL;DISCOMFORT

## 2023-08-13 ASSESSMENT — PAIN DESCRIPTION - LOCATION
LOCATION: KNEE
LOCATION: KNEE

## 2023-08-13 ASSESSMENT — PAIN DESCRIPTION - ORIENTATION
ORIENTATION: LEFT
ORIENTATION: LEFT

## 2023-08-14 PROCEDURE — 97110 THERAPEUTIC EXERCISES: CPT

## 2023-08-14 PROCEDURE — 97116 GAIT TRAINING THERAPY: CPT

## 2023-08-14 PROCEDURE — 6370000000 HC RX 637 (ALT 250 FOR IP): Performed by: PHYSICAL MEDICINE & REHABILITATION

## 2023-08-14 PROCEDURE — 97530 THERAPEUTIC ACTIVITIES: CPT

## 2023-08-14 PROCEDURE — 97140 MANUAL THERAPY 1/> REGIONS: CPT

## 2023-08-14 PROCEDURE — 99232 SBSQ HOSP IP/OBS MODERATE 35: CPT | Performed by: PHYSICAL MEDICINE & REHABILITATION

## 2023-08-14 PROCEDURE — 94761 N-INVAS EAR/PLS OXIMETRY MLT: CPT

## 2023-08-14 PROCEDURE — 94150 VITAL CAPACITY TEST: CPT

## 2023-08-14 PROCEDURE — 1280000000 HC REHAB R&B

## 2023-08-14 RX ADMIN — CLOPIDOGREL BISULFATE 75 MG: 75 TABLET ORAL at 08:26

## 2023-08-14 RX ADMIN — ACETAMINOPHEN 650 MG: 325 TABLET ORAL at 05:35

## 2023-08-14 RX ADMIN — MULTIPLE VITAMINS W/ MINERALS TAB 1 TABLET: TAB at 08:26

## 2023-08-14 RX ADMIN — ACETAMINOPHEN 650 MG: 325 TABLET ORAL at 12:29

## 2023-08-14 RX ADMIN — ACETAMINOPHEN 650 MG: 325 TABLET ORAL at 20:10

## 2023-08-14 RX ADMIN — AMIODARONE HYDROCHLORIDE 100 MG: 200 TABLET ORAL at 08:27

## 2023-08-14 RX ADMIN — Medication 3 MG: at 20:10

## 2023-08-14 RX ADMIN — AMLODIPINE BESYLATE 5 MG: 5 TABLET ORAL at 08:26

## 2023-08-14 RX ADMIN — ATORVASTATIN CALCIUM 40 MG: 40 TABLET, FILM COATED ORAL at 20:10

## 2023-08-14 RX ADMIN — LISINOPRIL 20 MG: 20 TABLET ORAL at 08:26

## 2023-08-14 RX ADMIN — HYDROCHLOROTHIAZIDE 12.5 MG: 25 TABLET ORAL at 08:26

## 2023-08-14 RX ADMIN — ACETAMINOPHEN 650 MG: 325 TABLET ORAL at 17:15

## 2023-08-14 RX ADMIN — DOCUSATE SODIUM 100 MG: 100 CAPSULE, LIQUID FILLED ORAL at 08:26

## 2023-08-14 RX ADMIN — ASPIRIN 81 MG: 81 TABLET, CHEWABLE ORAL at 08:26

## 2023-08-14 RX ADMIN — SENNOSIDES 8.6 MG: 8.6 TABLET, FILM COATED ORAL at 20:10

## 2023-08-14 ASSESSMENT — PAIN DESCRIPTION - ORIENTATION
ORIENTATION: LEFT

## 2023-08-14 ASSESSMENT — PAIN DESCRIPTION - LOCATION
LOCATION: KNEE

## 2023-08-14 ASSESSMENT — PAIN SCALES - GENERAL
PAINLEVEL_OUTOF10: 2
PAINLEVEL_OUTOF10: 0
PAINLEVEL_OUTOF10: 5
PAINLEVEL_OUTOF10: 3
PAINLEVEL_OUTOF10: 0
PAINLEVEL_OUTOF10: 6

## 2023-08-14 ASSESSMENT — PAIN DESCRIPTION - DESCRIPTORS
DESCRIPTORS: DISCOMFORT;ACHING
DESCRIPTORS: DISCOMFORT

## 2023-08-14 ASSESSMENT — PAIN - FUNCTIONAL ASSESSMENT: PAIN_FUNCTIONAL_ASSESSMENT: PREVENTS OR INTERFERES SOME ACTIVE ACTIVITIES AND ADLS

## 2023-08-14 NOTE — PROGRESS NOTES
Independent  Comment: mod I with 2ww  CARE Score: 6  Discharge Goal: Independent    1 Step (Curb)  Assistance Needed: Supervision or touching assistance  Comment: supervision with cues for sequence with 2ww  CARE Score: 4  Discharge Goal: Independent    4 Steps  Assistance Needed: Independent  Comment: cane and L rail ascending  CARE Score: 6  Discharge Goal: Independent    12 Steps  Assistance Needed: Supervision or touching assistance  Comment: supervision with cane and L rail ascending  Reason if not Attempted: Not attempted due to medical condition or safety concerns  CARE Score: 4  Discharge Goal: Independent      Additional Therapeutic activities/exercises completed this date:     []   Nu-step:  Time:        Level:         #Steps:       []   Rebounder:    []  Seated     []  Standing        []   Balance training         []   Postural training    []   Supine ther ex (reps/sets):     []   Seated ther ex (reps/sets):     [x]   Standing ther ex (reps/sets): 15 x1 for B knee flexion, abduction. 10 x1 hip extension and mini squats. 8x1 for TKE using theraband    []   Other: Toileting activity completed with    [x]   Other: Pt wanting to trial cane for gait. Pt had decreased clearance of L foot, decreased overall upright posture, increased lateral sway, B knee flexion with unsteadiness, CGA x 40' including turn.  Pt verbalized \"oh, I'm not ready for that yet\"    ORTHO EXERCISES    Position:   [x] supine   [x] seated       Repetitions: []5    [x]10x2    []15    []20    []25  Exercises completed:LLE  [] AP's    [x] quad sets -BLE   [] hamstring sets    [x] gluteal sets  -BLE  [x] heelslides    [x] SAQ-10 degree TKE lag  [x] SLR-10-15 degree TKE lag  [x] hip abduction    [x] LAQ  [x] knee flex    [x] marching    OTHER  [x] Knee ROM Measurements PROM 5-105 degrees    AROM(in sitting)  degrees  [] Hip precautions education ([]anteriolateral  []posteriolateral)  [x] Other manual edema reduction techniques ~2\" from

## 2023-08-14 NOTE — DISCHARGE INSTRUCTIONS
Your information:  Name: Tye Thomas  : 1940    Your instructions:    Pt is discharging to home with referral for [facility]  [address] [place] Revel[address] [place]  [phone]    What to do after you leave the hospital:    Recommended diet: regular diet    Recommended activity: activity as tolerated, please follow up with physician for activity restrictions. The following personal items were collected during your admission and were returned to you:    Belongings  Dental Appliances: None  Vision - Corrective Lenses: Eyeglasses  Hearing Aid: None  Clothing: Footwear, Shirt, Socks, Undergarments  Jewelry: None  Body Piercings Removed: N/A  Electronic Devices: Cell Phone, Tablet  Weapons (Notify Protective Services/Security): None  Other Valuables: Sent home  Home Medications: None  Valuables Given To: Family (Comment)  Provide Name(s) of Who Valuable(s) Were Given To: wife  Patient approves for provider to speak to responsible person post operatively: Yes    Information obtained by:  By signing below, I understand that if any problems occur once I leave the hospital I am to contact [phone]. I understand and acknowledge receipt of the instructions indicated above.

## 2023-08-14 NOTE — DISCHARGE INSTR - ACTIVITY
Pt is discharging to home with referral for [facility]  [address] [place] [address] a[place]nvir Reveles [place]  [phone]

## 2023-08-14 NOTE — PROGRESS NOTES
Occupational Therapy  Physical Rehabilitation: OCCUPATIONAL THERAPY     [x] daily progress note       [] discharge       Patient Name:  Miranda Murphy   :  1940 MRN: [id number]  Room:  [address] [place] [address] Date of Admission: 2023  Rehabilitation Diagnosis:   Primary osteoarthritis of left knee [M17.12]       Date 2023       Day of ARU Week:  7   Time IN/OUT 0958-1904   Individual Tx Minutes 60   Group Tx Minutes    Co-Treat Minutes    Concurrent Tx Minutes    TOTAL Tx Time Mins 60   Variance Time    Variance Time []   Refusal due to:     []   Medical hold/reason:    []   Illness   []   Off Unit for test/procedure  []   Extra time needed to complete task  []   Therapeutic need  []   Other (specify):   Restrictions Restrictions/Precautions: Fall Risk, Weight Bearing, General Precautions (WBAT LLE)         Communication with other providers: [x]   OK to see per nursing:     []   Spoke with team member regarding:      Subjective observations and cognitive status: Pt sitting up in recliner on approach; pleasant and agreeable to therapy. Pain level/location:   2 /10       Location: L knee only when mobile    Discharge recommendations  Anticipated discharge date:    Destination: []home alone   []home alone w assist prn   [x] home w/ family    [] Continuous supervision       []SNF    [] Assisted living     [] Other:   Continued therapy: []HHC OT  []OUTPATIENT  OT   [x] No Further OT  Equipment needs: None     Toileting:   denied need       Toilet Transfers:   NA   Device Used:    []   Standard Toilet         []   Grab Bars           []  Bedside Commode       []   Elevated Toilet          []   Other:        Bed Mobility:           [x]   Pt received out of bed       Transfers:    Sit--> Stand:   Mod I   Stand --> Sit:   mod I   Stand-Pivot:   Supervision   Other:    Assistive device required for transfer:   RW       Functional Mobility:  Around ARU   Assistance:  Supervision   Device:   [x]   [employer]

## 2023-08-15 LAB
ANION GAP SERPL CALCULATED.3IONS-SCNC: 9 MMOL/L (ref 4–16)
BUN SERPL-MCNC: 35 MG/DL (ref 6–23)
CALCIUM SERPL-MCNC: 8.5 MG/DL (ref 8.3–10.6)
CHLORIDE BLD-SCNC: 102 MMOL/L (ref 99–110)
CO2: 27 MMOL/L (ref 21–32)
CREAT SERPL-MCNC: 1.2 MG/DL (ref 0.9–1.3)
GFR SERPL CREATININE-BSD FRML MDRD: 59 ML/MIN/1.73M2
GLUCOSE SERPL-MCNC: 158 MG/DL (ref 70–99)
HCT VFR BLD CALC: 33.6 % (ref 42–52)
HEMOGLOBIN: 10.3 GM/DL (ref 13.5–18)
MCH RBC QN AUTO: 28.1 PG (ref 27–31)
MCHC RBC AUTO-ENTMCNC: 30.7 % (ref 32–36)
MCV RBC AUTO: 91.8 FL (ref 78–100)
PDW BLD-RTO: 15.3 % (ref 11.7–14.9)
PLATELET # BLD: 392 K/CU MM (ref 140–440)
PMV BLD AUTO: 10.5 FL (ref 7.5–11.1)
POTASSIUM SERPL-SCNC: 4.6 MMOL/L (ref 3.5–5.1)
RBC # BLD: 3.66 M/CU MM (ref 4.6–6.2)
SODIUM BLD-SCNC: 138 MMOL/L (ref 135–145)
WBC # BLD: 11.8 K/CU MM (ref 4–10.5)

## 2023-08-15 PROCEDURE — 85027 COMPLETE CBC AUTOMATED: CPT

## 2023-08-15 PROCEDURE — 97116 GAIT TRAINING THERAPY: CPT

## 2023-08-15 PROCEDURE — 1280000000 HC REHAB R&B

## 2023-08-15 PROCEDURE — 94761 N-INVAS EAR/PLS OXIMETRY MLT: CPT

## 2023-08-15 PROCEDURE — 6370000000 HC RX 637 (ALT 250 FOR IP): Performed by: PHYSICAL MEDICINE & REHABILITATION

## 2023-08-15 PROCEDURE — 80048 BASIC METABOLIC PNL TOTAL CA: CPT

## 2023-08-15 PROCEDURE — 97110 THERAPEUTIC EXERCISES: CPT

## 2023-08-15 PROCEDURE — 97530 THERAPEUTIC ACTIVITIES: CPT

## 2023-08-15 PROCEDURE — 36415 COLL VENOUS BLD VENIPUNCTURE: CPT

## 2023-08-15 PROCEDURE — 99232 SBSQ HOSP IP/OBS MODERATE 35: CPT | Performed by: PHYSICAL MEDICINE & REHABILITATION

## 2023-08-15 PROCEDURE — 97535 SELF CARE MNGMENT TRAINING: CPT

## 2023-08-15 RX ORDER — DOCUSATE SODIUM 100 MG/1
100 CAPSULE, LIQUID FILLED ORAL 2 TIMES DAILY PRN
Status: DISCONTINUED | OUTPATIENT
Start: 2023-08-15 | End: 2023-08-16 | Stop reason: HOSPADM

## 2023-08-15 RX ADMIN — ASPIRIN 81 MG: 81 TABLET, CHEWABLE ORAL at 08:30

## 2023-08-15 RX ADMIN — ACETAMINOPHEN 650 MG: 325 TABLET ORAL at 20:53

## 2023-08-15 RX ADMIN — CLOPIDOGREL BISULFATE 75 MG: 75 TABLET ORAL at 08:30

## 2023-08-15 RX ADMIN — ATORVASTATIN CALCIUM 40 MG: 40 TABLET, FILM COATED ORAL at 20:53

## 2023-08-15 RX ADMIN — ACETAMINOPHEN 650 MG: 325 TABLET ORAL at 17:35

## 2023-08-15 RX ADMIN — ACETAMINOPHEN 650 MG: 325 TABLET ORAL at 14:01

## 2023-08-15 RX ADMIN — HYDROCHLOROTHIAZIDE 12.5 MG: 25 TABLET ORAL at 08:30

## 2023-08-15 RX ADMIN — AMIODARONE HYDROCHLORIDE 100 MG: 200 TABLET ORAL at 08:30

## 2023-08-15 RX ADMIN — MULTIPLE VITAMINS W/ MINERALS TAB 1 TABLET: TAB at 08:30

## 2023-08-15 RX ADMIN — ACETAMINOPHEN 650 MG: 325 TABLET ORAL at 05:30

## 2023-08-15 ASSESSMENT — PAIN DESCRIPTION - DESCRIPTORS
DESCRIPTORS: ACHING;DISCOMFORT
DESCRIPTORS: ACHING

## 2023-08-15 ASSESSMENT — PAIN SCALES - GENERAL
PAINLEVEL_OUTOF10: 4
PAINLEVEL_OUTOF10: 5
PAINLEVEL_OUTOF10: 2
PAINLEVEL_OUTOF10: 0
PAINLEVEL_OUTOF10: 0

## 2023-08-15 ASSESSMENT — PAIN DESCRIPTION - ORIENTATION
ORIENTATION: LEFT
ORIENTATION: LEFT

## 2023-08-15 ASSESSMENT — PAIN DESCRIPTION - LOCATION
LOCATION: KNEE
LOCATION: KNEE

## 2023-08-15 ASSESSMENT — PAIN - FUNCTIONAL ASSESSMENT: PAIN_FUNCTIONAL_ASSESSMENT: PREVENTS OR INTERFERES SOME ACTIVE ACTIVITIES AND ADLS

## 2023-08-15 NOTE — PROGRESS NOTES
Damien Garcia    : 1940  Acct #: [de-identified]  MRN: [id number]              PM&R Progress Note      Admitting diagnosis: Primary osteoarthritis of the left knee ([place] 8.61)     Comorbid diagnoses impacting rehabilitation: Uncontrolled pain, generalized weakness, gait disturbance, acute blood loss anemia, paroxysmal atrial fibrillation, CAD, essential hypertension, chronic diastolic congestive heart failure, BPH with urinary hesitancy, history of CVA    Chief complaint: Improving left knee pain. Anxious to get home this week. Prior (baseline) level of function: Independent.     Current level of function:         Current  IRF-CIRA and Goals:   Occupational Therapy:    Short Term Goals  Time Frame for Short Term Goals: STGs=LTGs :   Long Term Goals  Time Frame for Long Term Goals : ~7 days or until d/c  Long Term Goal 1: Pt will complete grooming tasks Ind  Long Term Goal 2: Pt will complete total body bathing mod I  Long Term Goal 3: Pt will complete UB dressing Ind  Long Term Goal 4: Pt will complete LB dressing mod I using AE PRN  Long Term Goal 5: Pt will doff/don footwear mod I using AE PRN  Additional Goals?: Yes  Long Term Goal 6: Pt will complete toileting mod I  Long Term Goal 7: Pt will complete functional transfers (bed, chair, toilet, shower) c DME PRN and mod I  Long Term Goal 8: Pt will perform therex/therax to facilitate increased strength/endurance/ax tolerance (c emphasis on dynamic standing balance/tolerance > 10 mins, BUE endurance) c SBA  Long Term Goal 9: Pt will complete simple homemaking tasks c DME PRN and mod I :                                       Eating: Eating  Assistance Needed: Independent  Comment: able to open packages, feed self  CARE Score: 6  Discharge Goal: Independent       Oral Hygiene: Oral Hygiene  Assistance Needed: Supervision or touching assistance  Comment: supervision in stance at sink  CARE Score: 4  Discharge Goal: Independent    UB/LB Bathing: Shower/Bathe

## 2023-08-15 NOTE — PROGRESS NOTES
pt completed 1 set x10 reps of the following therex, c a 5# weighted:   Shoulder presses  Chest presses  Shoulder horizontal abduction/adduction  Concentric arm circles (clockwise)  Bicep curls  Tricep curls     []   Standing Ther Ex (reps/sets):     []   Other:      Comments: All intervention performed to increase pt's strength, endurance, ax tolerance, and balance in prep for increased I c ADL/IADLs and functional transfers/mobility. Patient/Caregiver Education and Training:   []   [employer] Equipment Use  []   Bed Mobility/Transfer Technique/Safety  []   Energy Conservation Tips  []   Family training  []   Postural Awareness  []   Safety During Functional Activities  []   Reinforced Patient's Precautions   []   Progress was updated and reviewed in Rehabtracker with patient and/or family this         date. Treatment Plan for Next Session: D/C to home 8/16      Assessment: This pt demonstrated a positive response to today's treatment as evidenced by QI scores. The patient is making progress toward established goals as evidenced by QI scores.     Treatment/Activity Tolerance:   [x] Tolerated treatment with no adverse effects    [] Patient limited by fatigue  [] Patient limited by pain   [] Patient limited by medical complications:    [] Adverse reaction to Tx:   [] Significant change in status    Safety:       []  bed alarm set    []  chair alarm set    [x]  Pt refused alarms                []  Telesitter activated      [x]  Gait belt used during tx session      []other:       Number of Minutes/Billable Intervention  Therapeutic Exercise 20   ADL Self-care 30   Neuro Re-Ed    Therapeutic Activity 10   Group    Other:    TOTAL 60       Social History  Social/Functional History  Lives With: Spouse Candance Bongo (has health limitations); has a son who lives locally and can assist prn)  Type of Home: House  Home Layout: Two level, Bed/Bath upstairs, Laundry in basement (~16 with L ascending rail; 1 flight of stairs

## 2023-08-15 NOTE — PROGRESS NOTES
Physical Therapy    [x] daily progress note       [x] discharge       Patient Name:  Bryon Kilgore   :  1940 MRN: [id number]  Room:  [address] [place] [address] Date of Admission: 2023  Rehabilitation Diagnosis:   Primary osteoarthritis of left knee [M17.12]       Date 8/15/2023       Day of ARU Week:  1   Time IN/OUT 1100/1200, 1312/1412   Individual Tx Minutes 60/60   Group Tx Minutes    Co-Treat Minutes    Concurrent Tx Minutes    TOTAL Tx Time Mins 120   Variance Time    Variance Time []   Refusal due to:     []   Medical hold/reason:    []   Illness   []   Off Unit for test/procedure  []   Extra time needed to complete task  []   Therapeutic need  []   Other (specify):   Restrictions Restrictions/Precautions  Restrictions/Precautions: Fall Risk, Weight Bearing, General Precautions (WBAT LLE)      Interdisciplinary communication [x]   Cleared for therapy per nursing     []   RN notified about issues during session  []   RN updated on pt performance  []   Spoke with   []   Spoke with OT  []   Spoke with MD  []   Other:    Subjective observations and cognitive status: Pt asleep in recliner, easily roused. States he is having trouble sleeping, but is agreeable to therapy.       Pain level/location:  AM: 3  /10  , PM:5/10    Location: L knee during movement-pt asked for tylenols at end of session   Discharge recommendations  Anticipated discharge date:    Destination: []home alone   []home alone with assist PRN     [x] home w/ family      [] Continuous supervision  []SNF    [] Assisted living     [] Other:  Continued therapy: [][facility] PT  [x]OUTPATIENT PT   [] No Further PT  []SNF PT  Caregiver training recommended: []Yes  [x] No   Equipment needs: pt has 2ww     PT IRF-CIRA scores and goals for discharge assessment:   Roll Left and Right  Assistance Needed: Independent  Comment: no rails  CARE Score: 6  Discharge Goal: Independent    Sit to Lying  Assistance Needed: Independent  Comment: no bed

## 2023-08-16 VITALS
DIASTOLIC BLOOD PRESSURE: 67 MMHG | OXYGEN SATURATION: 96 % | HEIGHT: 70 IN | RESPIRATION RATE: 16 BRPM | TEMPERATURE: 98.1 F | BODY MASS INDEX: 25.28 KG/M2 | WEIGHT: 176.59 LBS | HEART RATE: 84 BPM | SYSTOLIC BLOOD PRESSURE: 128 MMHG

## 2023-08-16 PROCEDURE — 99239 HOSP IP/OBS DSCHRG MGMT >30: CPT | Performed by: PHYSICAL MEDICINE & REHABILITATION

## 2023-08-16 PROCEDURE — 6370000000 HC RX 637 (ALT 250 FOR IP): Performed by: PHYSICAL MEDICINE & REHABILITATION

## 2023-08-16 RX ADMIN — HYDROCHLOROTHIAZIDE 12.5 MG: 25 TABLET ORAL at 10:30

## 2023-08-16 RX ADMIN — MULTIPLE VITAMINS W/ MINERALS TAB 1 TABLET: TAB at 10:31

## 2023-08-16 RX ADMIN — ASPIRIN 81 MG: 81 TABLET, CHEWABLE ORAL at 10:31

## 2023-08-16 RX ADMIN — AMLODIPINE BESYLATE 5 MG: 5 TABLET ORAL at 10:31

## 2023-08-16 RX ADMIN — ACETAMINOPHEN 650 MG: 325 TABLET ORAL at 10:29

## 2023-08-16 RX ADMIN — ACETAMINOPHEN 650 MG: 325 TABLET ORAL at 06:05

## 2023-08-16 RX ADMIN — AMIODARONE HYDROCHLORIDE 100 MG: 200 TABLET ORAL at 10:30

## 2023-08-16 RX ADMIN — LISINOPRIL 20 MG: 20 TABLET ORAL at 10:30

## 2023-08-16 RX ADMIN — CLOPIDOGREL BISULFATE 75 MG: 75 TABLET ORAL at 10:30

## 2023-08-16 ASSESSMENT — PAIN DESCRIPTION - LOCATION
LOCATION: KNEE
LOCATION: KNEE

## 2023-08-16 ASSESSMENT — PAIN - FUNCTIONAL ASSESSMENT: PAIN_FUNCTIONAL_ASSESSMENT: PREVENTS OR INTERFERES SOME ACTIVE ACTIVITIES AND ADLS

## 2023-08-16 ASSESSMENT — PAIN SCALES - GENERAL
PAINLEVEL_OUTOF10: 2
PAINLEVEL_OUTOF10: 1
PAINLEVEL_OUTOF10: 2

## 2023-08-16 ASSESSMENT — PAIN DESCRIPTION - ORIENTATION
ORIENTATION: LEFT
ORIENTATION: LEFT

## 2023-08-16 ASSESSMENT — PAIN DESCRIPTION - DESCRIPTORS
DESCRIPTORS: ACHING
DESCRIPTORS: DULL

## 2023-08-16 NOTE — PATIENT CARE CONFERENCE
ACUTE REHAB TEAM CONFERENCE SUMMARY   [address]    NAME: Bib Salcedo  : 1940 ADMIT DATE: 2023    Rehab Admitting Dx:Primary osteoarthritis of left knee [M17.12]  Patient Comorbid Conditions: Active Hospital Problems    Diagnosis Date Noted    Primary osteoarthritis of left knee [M17.12] 2023    History of CVA (cerebrovascular accident) [Z86.73] 2023    Benign prostatic hyperplasia with urinary hesitancy [N40.1, R39.11] 2023    Coronary artery disease involving native coronary artery of native heart without angina pectoris [I25.10]      Date: 2023    CASE MANAGEMENT  Current issues/needs regarding patient and family discharge status: ***  Patient and family goal: ***    PHYSICAL THERAPY (Updated in QI)  Short Term Goals  Time Frame for Short Term Goals: 7-10 tx days:  Short Term Goal 1: Pt will complete bed mobility (scooting, rolling R/L, and sup<->sit) Ind. Short Term Goal 2: Pt will complete OOB transfers using 2WW Mod Ind. Short Term Goal 3: Pt will ambulate >/=150 ft with turns over level surface and >/=10 ft uneven surface using 2WW Mod Ind. Short Term Goal 4: Pt will ascend/descend curb step using 2WW and 1 flight of stairs using railings/railing + SPC prn Mod Ind following appropriate step-to pattern on ascent/descent  Short Term Goal 5: Pt will complete object retrieval from the floor with 2WW and using reacher prn Mod Ind. Impairments/deficits, barriers:     Body Structures, Functions, Activity Limitations Requiring Skilled Therapeutic Intervention: Decreased functional mobility , Decreased ADL status, Decreased ROM, Decreased strength, Decreased endurance, Decreased balance, Decreased high-level IADLs, Increased pain     Therapy Prognosis: Good  Decision Making: Medium Complexity  Clinical Presentation: evolving with changing characteristics  Equipment needed at discharge:***      PT IRF-CIRA scores since initial assessment  Bed

## 2023-08-16 NOTE — PROGRESS NOTES
Sinan Dixon    : 1940  Acct #: [de-identified]  MRN: [id number]              PM&R Progress Note      Admitting diagnosis: Primary osteoarthritis of the left knee ([place] 8.61)     Comorbid diagnoses impacting rehabilitation: Uncontrolled pain, generalized weakness, gait disturbance, acute blood loss anemia, paroxysmal atrial fibrillation, CAD, essential hypertension, chronic diastolic congestive heart failure, BPH with urinary hesitancy, history of CVA    Chief complaint: No GI upset, chest pain or worsening knee pain. Prior (baseline) level of function: Independent.     Current level of function:         Current  IRF-CIRA and Goals:   Occupational Therapy:    Short Term Goals  Time Frame for Short Term Goals: STGs=LTGs :   Long Term Goals  Time Frame for Long Term Goals : ~7 days or until d/c  Long Term Goal 1: Pt will complete grooming tasks Ind  Long Term Goal 2: Pt will complete total body bathing mod I  Long Term Goal 3: Pt will complete UB dressing Ind  Long Term Goal 4: Pt will complete LB dressing mod I using AE PRN  Long Term Goal 5: Pt will doff/don footwear mod I using AE PRN  Additional Goals?: Yes  Long Term Goal 6: Pt will complete toileting mod I  Long Term Goal 7: Pt will complete functional transfers (bed, chair, toilet, shower) c DME PRN and mod I  Long Term Goal 8: Pt will perform therex/therax to facilitate increased strength/endurance/ax tolerance (c emphasis on dynamic standing balance/tolerance > 10 mins, BUE endurance) c SBA  Long Term Goal 9: Pt will complete simple homemaking tasks c DME PRN and mod I :                                       Eating: Eating  Assistance Needed: Independent  Comment: able to open all containers/packages  CARE Score: 6  Discharge Goal: Independent       Oral Hygiene: Oral Hygiene  Assistance Needed: Independent  Comment: IND standing at sink  CARE Score: 6  Discharge Goal: Independent    UB/LB Bathing: Shower/Bathe Self  Assistance Needed:

## 2023-08-16 NOTE — CARE COORDINATION
ARU  Discharge Summary    D/C Date: 08/16/2023    Patient discharged to: Home    Transported by: Family    Referrals made to: [facility]     Additional information: Follow up scheduled with Lorenza Jennings MD Tuesday Aug 22, 2023 1:30 PM, Javier Han MD Tuesday Sep 12, 2023 11:00 AM. Patient to call the office of Arron Dotson MD directly to schedule follow up.  Patient notified, AVS Updated    Caregiver training: None requested    Discharge BIMS completed:  [x]

## 2023-08-17 NOTE — DISCHARGE SUMMARY
ON DISCHARGE: Stable. The prognosis is fair+ for further improvements in ADL's and safety with adapted gait/transfers. Record review, patient exam, discharge instructions, medication reconciliation and summary for this discharge visit took more than 30 minutes.

## 2023-08-21 ENCOUNTER — HOSPITAL ENCOUNTER (OUTPATIENT)
Dept: PHYSICAL THERAPY | Age: 83
Setting detail: THERAPIES SERIES
Discharge: HOME | End: 2023-08-21
Payer: MEDICARE

## 2023-08-21 PROCEDURE — 97162 PT EVAL MOD COMPLEX 30 MIN: CPT

## 2023-08-21 PROCEDURE — 97110 THERAPEUTIC EXERCISES: CPT

## 2023-08-21 NOTE — PROGRESS NOTES
home: 1) getting in and out of shower 2) sleeping 3) ambulating long distances. Patient goals: \"to get around better\"    Orientation: WNL    Objective    AROM  LE  Knee:    Right Left   Knee flexion      125  degrees       125 degrees AAROM  119 degrees AROM   Knee extension      0  degrees      2  degree lag     Strength LE  Hip:   Right Left   Hip flexion         4+/5        grossly  3/5     Knee:    Right Left   Knee flexion        4+/5    grossly  3/5   Knee extension        4+/5        grossly  3/5     Ankle:   Right Left   Ankle DF         4+/5        4/5      Bed Mobility: Independent    Transfers  Sit to Stand: Mod I  Stand to sit: Mod I    Ambulation  Ambulation?: Yes  WB Status: FWB  Surface: level tile  Device: RW  Assistance: mod I  Quality of Gait: Pt demonstrated steady gait, but decreased speed and slight antalgia. Pt educated to raise his walker and educated on the proper walker height. Distance: 150 feet  Stairs?: see flow sheet. Palpation: PT performed patellar mobs in all directions with good movement. Additional Tests: LEFs: 25/80 = 31.25% ability = 68.75% disability     Assessment   Decreased functional mobility ; Decreased strength;Decreased endurance;Decreased high-level IADLs;Decreased ADL status; Decreased ROM; Assessment: Pt is a pleasant 79 yo male who would benefit from skilled PT to address decreased ROM, strength, balance, endurance, functional mobility, and increased pain. Prognosis: Good  Discharge Recommendations: Patient would benefit from additional therapy;Continue to assess pending progress,   Requires PT Follow Up: Yes  Activity Tolerance: Patient Tolerated treatment well. Treatment Administered: See flowsheet  Patient Education: See flowsheet  Learning Style:  Any    Plan   Plan of care initiated  Frequency and duration of tx:  2x/week up to 6 weeks  Barriers include: none  Treatment:  Therapeutic exercises including ROM, PREs, stretching, strengthening, and

## 2023-08-21 NOTE — FLOWSHEET NOTE
Outpatient Physical Therapy  [place]           [] Phone: [phone]   Fax: [phone]  [place]           [x] Phone: [phone]   Fax: [phone]        Physical Therapy Daily Treatment Note  Date:  2023    Patient Name:  [facility] \"Luis\"  Monique Holt    :  1940  MRN: [id number]  Restrictions/Precautions: none  Diagnosis:   Aftercare following right knee joint replacement surgery [Z47.1, Z96.651]    Date of Injury/Surgery: Pt underwent L TKA 23. He was in ARU  - . Treatment Diagnosis:   M62.81 muscle weakness, R26.89 abnormality of gait  Insurance/Certification information:  1) Medicare 2) [employer]  Referring Physician:  Reilly De La Garza MD   / Rishabh Perrin PA-C  PCP: Thor Darnell MD   Plan of care signed (Y/N):  eval co sign and fax sent  Outcome Measure: LEFs:  = 31.25% ability = 68.75% disability   Visit# / total visits:   1/10 then PN  Pain level: 4/10   Goals:     Patient goals:  \"to get around better\"    Long term goals to be achieved by 2023:   Long term goal 1: Pt will demonstrate I with current HEP as prescribed in order to increase ROM and strength. Long term goal 2: Pt will demonstrate AROM L knee at least  0-125 degrees in order to improve ROM. Long term goal 3: Pt will demonstrate L LE strength at least 4+/5 in order to improve strength. Long term goal 4: Pt will demonstrate a LEFs of no more than 45% disability in order to improve quality of life. Long term goal 5: Pt will demonstrate the ability to safely ambulate 2 laps in the clinic with no AD in order to improve functional mobility. Long term goal 6: Pt will demonstrate the ability to safely ambulate up and down the steps reciprocally with 1 HHA in 2/2 trials in order to improve functional mobility. Subjective:  See eval     Any changes in Ambulatory Summary Sheet?   None    Objective:  See eval     Exercises: (No more than 4 columns)   Exercise/Equipment Date: 23 Date

## 2023-08-24 ENCOUNTER — HOSPITAL ENCOUNTER (OUTPATIENT)
Dept: PHYSICAL THERAPY | Age: 83
Setting detail: THERAPIES SERIES
Discharge: HOME | End: 2023-08-24
Payer: MEDICARE

## 2023-08-24 PROCEDURE — 97110 THERAPEUTIC EXERCISES: CPT

## 2023-08-24 PROCEDURE — 97112 NEUROMUSCULAR REEDUCATION: CPT

## 2023-08-24 NOTE — FLOWSHEET NOTE
Outpatient Physical Therapy  [place]           [] Phone: [phone]   Fax: [phone]  [facility] Song           [x] Phone: [phone]   Fax: [phone]        Physical Therapy Daily Treatment Note  Date:  2023    Patient Name:  Zoya Lima \"Luis\"  Elizabeth Aguilar    :  1940  MRN: [id number]  Restrictions/Precautions: none  Diagnosis:   Aftercare following right knee joint replacement surgery [Z47.1, Z96.651]    Date of Injury/Surgery: Pt underwent L TKA 23. He was in ARU  - . Treatment Diagnosis:   M62.81 muscle weakness, R26.89 abnormality of gait  Insurance/Certification information:  1) Medicare 2) [employer]  Referring Physician:  Aleajndrina Calvin MD   / Rhina Alexander PA-C  PCP: Claudell Garden, MD   Plan of care signed (Y/N):  brunoal co sign and fax sent  Outcome Measure: LEFs:  = 31.25% ability = 68.75% disability   Visit# / total visits:   2/10 then PN  Pain level: 2/10   Goals:     Patient goals:  \"to get around better\"    Long term goals to be achieved by 2023:   Long term goal 1: Pt will demonstrate I with current HEP as prescribed in order to increase ROM and strength. Long term goal 2: Pt will demonstrate AROM L knee at least  0-125 degrees in order to improve ROM. Long term goal 3: Pt will demonstrate L LE strength at least 4+/5 in order to improve strength. Long term goal 4: Pt will demonstrate a LEFs of no more than 45% disability in order to improve quality of life. Long term goal 5: Pt will demonstrate the ability to safely ambulate 2 laps in the clinic with no AD in order to improve functional mobility. Long term goal 6: Pt will demonstrate the ability to safely ambulate up and down the steps reciprocally with 1 HHA in 2/2 trials in order to improve functional mobility. Subjective:  Patient states that pain is there \"but not too bad\" today. Any changes in Ambulatory Summary Sheet?   None    Objective:  L knee flex  deg  Minimal lag on SLR

## 2023-08-28 ENCOUNTER — HOSPITAL ENCOUNTER (OUTPATIENT)
Dept: PHYSICAL THERAPY | Age: 83
Setting detail: THERAPIES SERIES
Discharge: HOME | End: 2023-08-28
Payer: MEDICARE

## 2023-08-28 PROCEDURE — 97110 THERAPEUTIC EXERCISES: CPT

## 2023-08-28 PROCEDURE — 97112 NEUROMUSCULAR REEDUCATION: CPT

## 2023-08-28 NOTE — FLOWSHEET NOTE
Outpatient Physical Therapy  [place]           [] Phone: [phone]   Fax: [phone]  [facility]           [x] Phone: [phone]   Fax: [phone]        Physical Therapy Daily Treatment Note  Date:  2023    Patient Name:  Lore Alex \"Luis\"  Fabio Obregon    :  1940  MRN: [id number]  Restrictions/Precautions: none  Diagnosis:   Aftercare following right knee joint replacement surgery [Z47.1, Z96.651]    Date of Injury/Surgery: Pt underwent L TKA 23. He was in ARU  - . Treatment Diagnosis:   M62.81 muscle weakness, R26.89 abnormality of gait  Insurance/Certification information:  1) Medicare 2) [employer] [facility]  Referring Physician:  Bessy Bowden MD   / Meg Prater PA-C  PCP: Anali Garvey MD   Plan of care signed (Y/N):  rodrick co sign and fax sent  Outcome Measure: LEFs:  = 31.25% ability = 68.75% disability   Visit# / total visits:   10 then PN  Pain level: 0/10   Goals:     Patient goals:  \"to get around better\"    Long term goals to be achieved by 2023:   Long term goal 1: Pt will demonstrate I with current HEP as prescribed in order to increase ROM and strength. Long term goal 2: Pt will demonstrate AROM L knee at least  0-125 degrees in order to improve ROM. Long term goal 3: Pt will demonstrate L LE strength at least 4+/5 in order to improve strength. Long term goal 4: Pt will demonstrate a LEFs of no more than 45% disability in order to improve quality of life. Long term goal 5: Pt will demonstrate the ability to safely ambulate 2 laps in the clinic with no AD in order to improve functional mobility. Long term goal 6: Pt will demonstrate the ability to safely ambulate up and down the steps reciprocally with 1 HHA in 2/2 trials in order to improve functional mobility. Subjective:  Patient reports pain 0/10. He asked if he could ambulate with a SPC. Any changes in Ambulatory Summary Sheet?   None    Objective:  AROM  LE    Knee:    Left

## 2023-08-31 ENCOUNTER — HOSPITAL ENCOUNTER (OUTPATIENT)
Dept: PHYSICAL THERAPY | Age: 83
Setting detail: THERAPIES SERIES
Discharge: HOME | End: 2023-08-31
Payer: MEDICARE

## 2023-08-31 PROCEDURE — 97110 THERAPEUTIC EXERCISES: CPT

## 2023-08-31 PROCEDURE — 97112 NEUROMUSCULAR REEDUCATION: CPT

## 2023-08-31 NOTE — FLOWSHEET NOTE
Outpatient Physical Therapy  [place]           [] Phone: [phone]   Fax: [phone]  [facility]           [x] Phone: [phone]   Fax: [phone]        Physical Therapy Daily Treatment Note  Date:  2023    Patient Name:  Nancy Vela \"Luis\"  Harrie Gosselin    :  1940  MRN: [id number]  Restrictions/Precautions: none  Diagnosis:   Aftercare following right knee joint replacement surgery [Z47.1, Z96.651]    Date of Injury/Surgery: Pt underwent L TKA 23. He was in ARU  - . Treatment Diagnosis:   M62.81 muscle weakness, R26.89 abnormality of gait  Insurance/Certification information:  1) Medicare 2) [employer]  Referring Physician:  Dionne Swain MD   / Kennedy Frederick PA-C  PCP: Jesus Reyes MD   Plan of care signed (Y/N):  eval co sign and fax sent  Outcome Measure: LEFs:  = 31.25% ability = 68.75% disability   Visit# / total visits:   4/10 then PN  Pain level: 1/10   Goals:     Patient goals:  \"to get around better\"    Long term goals to be achieved by 2023:   Long term goal 1: Pt will demonstrate I with current HEP as prescribed in order to increase ROM and strength. Long term goal 2: Pt will demonstrate AROM L knee at least  0-125 degrees in order to improve ROM. Long term goal 3: Pt will demonstrate L LE strength at least 4+/5 in order to improve strength. Long term goal 4: Pt will demonstrate a LEFs of no more than 45% disability in order to improve quality of life. Long term goal 5: Pt will demonstrate the ability to safely ambulate 2 laps in the clinic with no AD in order to improve functional mobility. Long term goal 6: Pt will demonstrate the ability to safely ambulate up and down the steps reciprocally with 1 HHA in 2/2 trials in order to improve functional mobility. Subjective:  Patient reports pain 0/10. He asked if he could ambulate with a SPC. Any changes in Ambulatory Summary Sheet?   None    Objective:  AROM  LE    Knee:    Left

## 2023-09-05 ENCOUNTER — HOSPITAL ENCOUNTER (OUTPATIENT)
Dept: PHYSICAL THERAPY | Age: 83
Setting detail: THERAPIES SERIES
Discharge: HOME | End: 2023-09-05
Payer: MEDICARE

## 2023-09-05 PROCEDURE — 97110 THERAPEUTIC EXERCISES: CPT

## 2023-09-05 PROCEDURE — 97112 NEUROMUSCULAR REEDUCATION: CPT

## 2023-09-05 NOTE — FLOWSHEET NOTE
Outpatient Physical Therapy  [place]           [] Phone: [phone]   Fax: [phone]  [facility]           [x] Phone: [phone]   Fax: [phone]        Physical Therapy Daily Treatment Note  Date:  2023    Patient Name:  Lore Alex \"Luis\"  Fabio Obregon    :  1940  MRN: [id number]  Restrictions/Precautions: none  Diagnosis:   Aftercare following right knee joint replacement surgery [Z47.1, Z96.651]    Date of Injury/Surgery: Pt underwent L TKA 23. He was in ARU  - . Treatment Diagnosis:   M62.81 muscle weakness, R26.89 abnormality of gait  Insurance/Certification information:  1) Medicare 2) [facility] [facility]  Referring Physician:  Bessy Bowden MD   / Meg Prater PA-C  PCP: Anali Garvey MD   Plan of care signed (Y/N):  rodrick co sign and fax sent  Outcome Measure: LEFs:  = 31.25% ability = 68.75% disability   Visit# / total visits:   5/10 then PN  Pain level: 1-2/10   Goals:     Patient goals:  \"to get around better\"    Long term goals to be achieved by 2023:   Long term goal 1: Pt will demonstrate I with current HEP as prescribed in order to increase ROM and strength. Long term goal 2: Pt will demonstrate AROM L knee at least  0-125 degrees in order to improve ROM. Long term goal 3: Pt will demonstrate L LE strength at least 4+/5 in order to improve strength. Long term goal 4: Pt will demonstrate a LEFs of no more than 45% disability in order to improve quality of life. Long term goal 5: Pt will demonstrate the ability to safely ambulate 2 laps in the clinic with no AD in order to improve functional mobility. Long term goal 6: Pt will demonstrate the ability to safely ambulate up and down the steps reciprocally with 1 HHA in 2/2 trials in order to improve functional mobility. Subjective:  Patient states having very little pain today. Rates his pain 1-2/10. Any changes in Ambulatory Summary Sheet?   None    Objective:  AROM  LE    Knee:    Left

## 2023-09-08 ENCOUNTER — HOSPITAL ENCOUNTER (OUTPATIENT)
Dept: PHYSICAL THERAPY | Age: 83
Setting detail: THERAPIES SERIES
Discharge: HOME | End: 2023-09-08
Payer: MEDICARE

## 2023-09-08 PROCEDURE — 97530 THERAPEUTIC ACTIVITIES: CPT

## 2023-09-08 PROCEDURE — 97110 THERAPEUTIC EXERCISES: CPT

## 2023-09-08 PROCEDURE — 97112 NEUROMUSCULAR REEDUCATION: CPT

## 2023-09-08 NOTE — FLOWSHEET NOTE
flexion            125 degrees AROM   Knee extension         Minimal lag on SLR with decent    Exercises: (No more than 4 columns)   Exercise/Equipment Date: 8/31/23 9/5/2023 9/8/23           WARM UP       NuStep   X5' S13/A14 Lvl 3 stop ---------   Bike  X5' S6 Lvl 2 Seat 6  Lev 2  10 min  Seat 6  Lev 2  10 min   TABLE      Seated dangle w/ OP      Heel slides w/ strap      SAQ 2# 2x10 5\" med roll L LE 2# 2x10 5\" med roll L LE 3# half roll  2x10 5\" LLE   Supine heel prop      Quad set      SLR 1x10 5\" L LE   Min lag 1x15 15x LLE w/LAG  initiate w/QS                  STANDING      Heel lifts      Hurdles forward      Hurdles lateral      Lateral step ups 6\" 1x10 L LE 6\" 1x10 L LE 6\" 12x LLE   Forward step ups 6\" 1x12 B LE 6\" 1x12 B LE 6\" 15x B   3 way hip 1x15 B LE 1x15 B LE 15x ea way B   TKE 1x15 L LE RTB 15x3\" LLE with RTB 15x3\" LLE with RTB   HS stretch on step           Knee flexion stretch on step 2x1'      Seated Dangle with OP  x2' 2 min  2'   PROPRIOCEPTION      Foam pad marching X1' 2 HHA 1 min  1 HHA 1'     one hand   Foam pad NBOS X1' 0 HHa 1 min no HHA 1 min no HHA   Rockerboard                         MODALITIES                    Other Therapeutic Activities/Education:  Pt ambulated 1 lap around the clinic with SPC and mod I. Home Exercise Program:  8/21 - pt to continue current HEP and to increase walking. Manual Treatments: x    Modalities:  none    Communication with other providers:  eval faxed. Assessment:  (Response towards treatment session) (Pain Rating) No change noted in pain level at the end of treatment. Plan for Next Session: Continue per POC, increase standing exercises.       Time In / Time Out:  7623-1200    Timed Code/Total Treatment Minutes:  39 1te 1ta 1nr     Next Progress Note due:  10/2/23    Plan of Care Interventions:  [x] Therapeutic Exercise  [] Modalities:  [x] Therapeutic Activity     [] Ultrasound  [] Estim  [x] Gait Training      [] Cervical Traction []

## 2023-09-11 ENCOUNTER — HOSPITAL ENCOUNTER (OUTPATIENT)
Dept: PHYSICAL THERAPY | Age: 83
Setting detail: THERAPIES SERIES
Discharge: HOME | End: 2023-09-11
Payer: MEDICARE

## 2023-09-11 PROCEDURE — 97530 THERAPEUTIC ACTIVITIES: CPT

## 2023-09-11 PROCEDURE — 97110 THERAPEUTIC EXERCISES: CPT

## 2023-09-11 NOTE — FLOWSHEET NOTE
foam     Plan for Next Session: Continue per POC, increase standing exercises.       Time In / Time Out:  1110/1145    Timed Code/Total Treatment Minutes:  28 1te  1nr     Next Progress Note due:  10/2/23    Plan of Care Interventions:  [x] Therapeutic Exercise  [] Modalities:  [x] Therapeutic Activity     [] Ultrasound  [] Estim  [x] Gait Training      [] Cervical Traction [] Lumbar Traction  [x] Neuromuscular Re-education    [] Cold/hotpack [] Iontophoresis   [x] Instruction in HEP      [] Vasopneumatic   [] Dry Needling    [x] Manual Therapy               [] Aquatic Therapy              Electronically signed by:  Loletta Oppenheim, PTA    9/11/2023, 11:08 AM

## 2023-09-12 ENCOUNTER — OFFICE VISIT (OUTPATIENT)
Dept: CARDIOLOGY CLINIC | Age: 83
End: 2023-09-12
Payer: MEDICARE

## 2023-09-12 VITALS
BODY MASS INDEX: 24.78 KG/M2 | WEIGHT: 177 LBS | DIASTOLIC BLOOD PRESSURE: 72 MMHG | HEART RATE: 74 BPM | HEIGHT: 71 IN | SYSTOLIC BLOOD PRESSURE: 120 MMHG

## 2023-09-12 DIAGNOSIS — Z51.81 ENCOUNTER FOR MONITORING AMIODARONE THERAPY: ICD-10-CM

## 2023-09-12 DIAGNOSIS — I48.0 PAROXYSMAL ATRIAL FIBRILLATION (HCC): Primary | ICD-10-CM

## 2023-09-12 DIAGNOSIS — I10 ESSENTIAL HYPERTENSION: ICD-10-CM

## 2023-09-12 DIAGNOSIS — Z95.820 S/P ANGIOPLASTY WITH STENT: ICD-10-CM

## 2023-09-12 DIAGNOSIS — E78.00 PURE HYPERCHOLESTEROLEMIA: ICD-10-CM

## 2023-09-12 DIAGNOSIS — Z79.899 ENCOUNTER FOR MONITORING AMIODARONE THERAPY: ICD-10-CM

## 2023-09-12 PROCEDURE — 1123F ACP DISCUSS/DSCN MKR DOCD: CPT | Performed by: INTERNAL MEDICINE

## 2023-09-12 PROCEDURE — 3078F DIAST BP <80 MM HG: CPT | Performed by: INTERNAL MEDICINE

## 2023-09-12 PROCEDURE — G8427 DOCREV CUR MEDS BY ELIG CLIN: HCPCS | Performed by: INTERNAL MEDICINE

## 2023-09-12 PROCEDURE — G8420 CALC BMI NORM PARAMETERS: HCPCS | Performed by: INTERNAL MEDICINE

## 2023-09-12 PROCEDURE — 1111F DSCHRG MED/CURRENT MED MERGE: CPT | Performed by: INTERNAL MEDICINE

## 2023-09-12 PROCEDURE — 93000 ELECTROCARDIOGRAM COMPLETE: CPT | Performed by: INTERNAL MEDICINE

## 2023-09-12 PROCEDURE — 3074F SYST BP LT 130 MM HG: CPT | Performed by: INTERNAL MEDICINE

## 2023-09-12 PROCEDURE — 99214 OFFICE O/P EST MOD 30 MIN: CPT | Performed by: INTERNAL MEDICINE

## 2023-09-12 PROCEDURE — 1036F TOBACCO NON-USER: CPT | Performed by: INTERNAL MEDICINE

## 2023-09-12 RX ORDER — AMIODARONE HYDROCHLORIDE 200 MG/1
100 TABLET ORAL DAILY
Qty: 45 TABLET | Refills: 3 | Status: SHIPPED | OUTPATIENT
Start: 2023-09-12

## 2023-09-12 RX ORDER — ATORVASTATIN CALCIUM 40 MG/1
40 TABLET, FILM COATED ORAL DAILY
Qty: 90 TABLET | Refills: 3 | Status: SHIPPED | OUTPATIENT
Start: 2023-09-12

## 2023-09-12 RX ORDER — CLOPIDOGREL BISULFATE 75 MG/1
75 TABLET ORAL DAILY
Qty: 90 TABLET | Refills: 3 | Status: SHIPPED | OUTPATIENT
Start: 2023-09-12

## 2023-09-12 RX ORDER — INDAPAMIDE 1.25 MG/1
1.25 TABLET ORAL EVERY MORNING
Qty: 90 TABLET | Refills: 3 | Status: SHIPPED | OUTPATIENT
Start: 2023-09-12

## 2023-09-12 RX ORDER — AMLODIPINE BESYLATE AND BENAZEPRIL HYDROCHLORIDE 5; 20 MG/1; MG/1
1 CAPSULE ORAL DAILY
Qty: 90 CAPSULE | Refills: 3 | Status: SHIPPED | OUTPATIENT
Start: 2023-09-12

## 2023-09-12 NOTE — ASSESSMENT & PLAN NOTE
EKG is consistent with normal sinus rhythm QTc is normal and his TSH has been normal also. LFTs are within normal limits.

## 2023-09-12 NOTE — PROGRESS NOTES
Alexx Andino  1940  Hayley Green MD      Chief Complaint   Patient presents with    Atrial Fibrillation    Hypertension    Hyperlipidemia     OV for 6 month check. Pt denies any chest pain, SOB, dizziness,swelling to ankles, or palpations. Chief complaint and HPI:  Alxex Andino  is a 78 y.o. male following up for known coronary artery disease and history of paroxysmal atrial fibrillation controlled on amiodarone therapy and has hypertension and hyperlipidemia history. He denies any cardiac symptoms today. He had right knee replaced in May this year followed by rehab and then left knee replaced on August 7 followed by inpatient rehab and has done very well. He is walking with a cane. Has not had any falls and has not finished physical therapy. Denies any chest pains did not have any arrhythmias perioperatively and tolerated both surgeries well. Medications are reviewed and he has been very compliant to his medications. Rest of the Cardiovascular system review is otherwise unchanged from prior encounter. Past medical history:  has a past medical history of Arthritis, Back pain, chronic, BPH (benign prostatic hyperplasia), CAD (coronary artery disease), Cancer ([address]), Family history of coronary artery disease, H/O cardiovascular stress test, H/O Doppler ultrasound, H/O Doppler ultrasound, H/O echocardiogram, Hyperlipidemia, Hypertension, Leg swelling, PAF (paroxysmal atrial fibrillation) ([address]), Palpitation, S/P angioplasty with stent, SOB (shortness of breath), and Torsade de pointes. Past surgical history:  has a past surgical history that includes Abdominal hernia repair; Cataract removal; Diagnostic Cardiac Cath Lab Procedure ( 10/6/03;10/16/03;10/5/04;3/12); Coronary angioplasty with stent; Cholecystectomy, laparoscopic (07/26/2022); Cholecystectomy, laparoscopic (N/A, 07/26/2022); hernia repair (Left, 10/14/2022); and knee surgery (Right, 05/08/2023).   Social History:   Social

## 2023-09-12 NOTE — ASSESSMENT & PLAN NOTE
Well-controlled on 100 mg amiodarone daily which she seems to be tolerating it well. He is high risk to fall and he has declined anticoagulation therapy in the past and currently is on dual antiplatelet therapy.

## 2023-09-14 ENCOUNTER — HOSPITAL ENCOUNTER (OUTPATIENT)
Dept: PHYSICAL THERAPY | Age: 83
Setting detail: THERAPIES SERIES
Discharge: HOME | End: 2023-09-14
Payer: MEDICARE

## 2023-09-14 PROCEDURE — 97110 THERAPEUTIC EXERCISES: CPT

## 2023-09-14 PROCEDURE — 97112 NEUROMUSCULAR REEDUCATION: CPT

## 2023-09-14 NOTE — FLOWSHEET NOTE
Other Therapeutic Activities/Education:  Pt educated to scheduled 2 additional visits beyond today, 1 next week, and 1 the week after. Home Exercise Program:  8/21 - pt to continue current HEP and to increase walking. Manual Treatments: x    Modalities:  none    Communication with other providers:  eval faxed. Assessment:  (Response towards treatment session) (Pain Rating) Patient rated his knee pain 1/10 after treatment. Held standing hip extension exercise this visit to see if that was bothering his LB. Continues to demonstrate increased fwd/bkwd sway with NBOS on blue foam     Plan for Next Session: Continue per POC, increase standing exercises.       Time In / Time Out: 1:02-1:50 pm    Timed Code/Total Treatment Minutes: 48'/1 neuro, 2 therapeutic exercise     Next Progress Note due:  10/2/23    Plan of Care Interventions:  [x] Therapeutic Exercise  [] Modalities:  [x] Therapeutic Activity     [] Ultrasound  [] Estim  [x] Gait Training      [] Cervical Traction [] Lumbar Traction  [x] Neuromuscular Re-education    [] Cold/hotpack [] Iontophoresis   [x] Instruction in HEP      [] Vasopneumatic   [] Dry Needling    [x] Manual Therapy               [] Aquatic Therapy              Electronically signed by:  Charlotte Monae PT, DPT [id number]    9/14/2023, 1:02 PM

## 2023-09-20 ENCOUNTER — HOSPITAL ENCOUNTER (OUTPATIENT)
Dept: PHYSICAL THERAPY | Age: 83
Setting detail: THERAPIES SERIES
Discharge: HOME | End: 2023-09-20
Payer: MEDICARE

## 2023-09-20 PROCEDURE — 97530 THERAPEUTIC ACTIVITIES: CPT

## 2023-09-20 PROCEDURE — 97112 NEUROMUSCULAR REEDUCATION: CPT

## 2023-09-20 PROCEDURE — 97110 THERAPEUTIC EXERCISES: CPT

## 2023-09-20 NOTE — FLOWSHEET NOTE
Outpatient Physical Therapy  [place]           [] Phone: [phone]   Fax: [phone]  [facility]           [x] Phone: [phone]   Fax: [phone]        Physical Therapy Daily Treatment Note  Date:  2023    Patient Name:  Zoya Lima \"Luis\"  Elizabeth Aguilar    :  1940  MRN: [id number]  Restrictions/Precautions: none  Diagnosis:   Aftercare following right knee joint replacement surgery [Z47.1, Z96.651]    Date of Injury/Surgery: Pt underwent L TKA 23. He was in ARU  - . Treatment Diagnosis:   M62.81 muscle weakness, R26.89 abnormality of gait  Insurance/Certification information:  1) Medicare 2) [employer]  Referring Physician:  Alejandrina Calvin MD   / Rhina Alexander PA-C  PCP: Claudell Garden, MD   Plan of care signed (Y/N):  rodrick co sign and fax sent  Outcome Measure: LEFs:  = 31.25% ability = 68.75% disability   Visit# / total visits:   9/10 then PN  Pain level: 1/10   Goals:     Patient goals:  \"to get around better\"    Long term goals to be achieved by 2023:   Long term goal 1: Pt will demonstrate I with current HEP as prescribed in order to increase ROM and strength. Long term goal 2: Pt will demonstrate AROM L knee at least  0-125 degrees in order to improve ROM. Long term goal 3: Pt will demonstrate L LE strength at least 4+/5 in order to improve strength. Long term goal 4: Pt will demonstrate a LEFs of no more than 45% disability in order to improve quality of life. Long term goal 5: Pt will demonstrate the ability to safely ambulate 2 laps in the clinic with no AD in order to improve functional mobility. Long term goal 6: Pt will demonstrate the ability to safely ambulate up and down the steps reciprocally with 1 HHA in 2/2 trials in order to improve functional mobility. Subjective: Patient rates his left knee pain 1/10 this morning and appears amb w/SPC. Any changes in Ambulatory Summary Sheet?   None    Objective:   Slight quad lag w/SLR  AROM

## 2023-09-27 ENCOUNTER — HOSPITAL ENCOUNTER (OUTPATIENT)
Dept: PHYSICAL THERAPY | Age: 83
Setting detail: THERAPIES SERIES
Discharge: HOME | End: 2023-09-27
Payer: MEDICARE

## 2023-09-27 PROCEDURE — 97112 NEUROMUSCULAR REEDUCATION: CPT

## 2023-09-27 PROCEDURE — 97110 THERAPEUTIC EXERCISES: CPT

## 2023-09-27 NOTE — DISCHARGE SUMMARY
[facility] Outpatient Physical Therapy  [place] [address]  Phone: [phone]  Fax: [phone]      Physician: Zarina Ghosh MD      From: Gabbi Dougherty, PT, DPT     Patient: Miranda Murphy                  : 1940  Diagnosis: M62.81 muscle weakness, R26.89 abnormality of gait      Date: 2023  Treatment Diagnosis:  M62.81 muscle weakness, R26.89 abnormality of gait    []  Progress Note                [x]  Discharge Note    Total Visits to date:   10 Cancels/No-shows to date:  0    Subjective: Patient rates his left knee pain 0/10 this morning and appears amb w/SPC. Pt reports his chronic back pain is his biggest hindrance at this point. Plan of Care/Treatment to date:  [x] Therapeutic Exercise    [x] Modalities:  [x] Therapeutic Activity     [] Ultrasound  [] Electric Stimulation  [x] Gait Training      [] Cervical Traction    [] Lumbar Traction  [x] Neuromuscular Re-education  [] Cold/hotpack [] Iontophoresis  [x] Instruction in HEP      Other:  [] Manual Therapy       [x]  Vasopneumatic  [] Aquatic Therapy       []                          Objective/Significant Findings At Last Visit/Comments: Pt ambulated up and down the steps 2x with 1 HHA and reciprocal gait and he ambulated 2 laps in the clinic with no AD. LEFs: 55/80 = 68.75% ability = 31.25% disability     AROM  LE  Knee:     Left   Knee flexion   130   Knee extension  0*          Strength LE  Hip:    Left   Hip flexion         4+/5      Knee:     Left   Knee flexion        5/5   Knee extension        5/5      Ankle:    Left   Ankle DF         4+/5      Assessment: Pt is a pleasant 79 yo male who has met all his goals for physical therapy following a L TKA. Skilled PT is no longer necessary.        Goal Status:  [x] Achieved [] Partially Achieved  [] Not Achieved   Long term goals to be achieved by 2023:   Long term goal 1: Pt will demonstrate I with current HEP as prescribed in

## 2023-09-27 NOTE — FLOWSHEET NOTE
Outpatient Physical Therapy  [place]           [] Phone: [phone]   Fax: [phone]  [facility]           [x] Phone: [phone]   Fax: [phone]        Physical Therapy Daily Treatment Note  Date:  2023    Patient Name:  Nancy Vela \"Luis\"  Harrie Gosselin    :  1940  MRN: [id number]  Restrictions/Precautions: none  Diagnosis:   Aftercare following right knee joint replacement surgery [Z47.1, Z96.651]    Date of Injury/Surgery: Pt underwent L TKA 23. He was in ARU  - . Treatment Diagnosis:   M62.81 muscle weakness, R26.89 abnormality of gait  Insurance/Certification information:  1) Medicare 2) [employer]  Referring Physician:  Dionne Swain MD   / Kennedy Frederick PA-C  PCP: Jesus Reyes MD   Plan of care signed (Y/N):  rodrick co sign and fax sent  Outcome Measure: LEFs:  = 31.25% ability = 68.75% disability   Visit# / total visits:   10/10 then PN  Pain level: 0/10 in knee and 4-5/10 in back. Goals:     Patient goals:  \"to get around better\"    Long term goals to be achieved by 2023:   Long term goal 1: Pt will demonstrate I with current HEP as prescribed in order to increase ROM and strength. - MET, discharge goal.   Long term goal 2: Pt will demonstrate AROM L knee at least  0-125 degrees in order to improve ROM. - MET, discharge goal.   Long term goal 3: Pt will demonstrate L LE strength at least 4+/5 in order to improve strength. - MET, discharge goal.   Long term goal 4: Pt will demonstrate a LEFs of no more than 45% disability in order to improve quality of life.  - MET, discharge goal.   Long term goal 5: Pt will demonstrate the ability to safely ambulate 2 laps in the clinic with no AD in order to improve functional mobility. - MET, discharge goal.   Long term goal 6: Pt will demonstrate the ability to safely ambulate up and down the steps reciprocally with 1 HHA in 2/2 trials in order to improve functional mobility.  - MET, discharge goal.      Subjective:

## 2024-03-12 ENCOUNTER — OFFICE VISIT (OUTPATIENT)
Dept: CARDIOLOGY CLINIC | Age: 84
End: 2024-03-12
Payer: MEDICARE

## 2024-03-12 VITALS
HEIGHT: 70 IN | BODY MASS INDEX: 25.25 KG/M2 | DIASTOLIC BLOOD PRESSURE: 72 MMHG | HEART RATE: 64 BPM | WEIGHT: 176.4 LBS | SYSTOLIC BLOOD PRESSURE: 128 MMHG

## 2024-03-12 DIAGNOSIS — I48.0 PAROXYSMAL ATRIAL FIBRILLATION (HCC): ICD-10-CM

## 2024-03-12 DIAGNOSIS — I10 ESSENTIAL HYPERTENSION: ICD-10-CM

## 2024-03-12 DIAGNOSIS — E78.00 PURE HYPERCHOLESTEROLEMIA: ICD-10-CM

## 2024-03-12 DIAGNOSIS — Z95.820 S/P ANGIOPLASTY WITH STENT: Primary | ICD-10-CM

## 2024-03-12 DIAGNOSIS — Z79.899 ENCOUNTER FOR MONITORING AMIODARONE THERAPY: ICD-10-CM

## 2024-03-12 DIAGNOSIS — Z51.81 ENCOUNTER FOR MONITORING AMIODARONE THERAPY: ICD-10-CM

## 2024-03-12 PROCEDURE — 99214 OFFICE O/P EST MOD 30 MIN: CPT | Performed by: INTERNAL MEDICINE

## 2024-03-12 PROCEDURE — G8419 CALC BMI OUT NRM PARAM NOF/U: HCPCS | Performed by: INTERNAL MEDICINE

## 2024-03-12 PROCEDURE — 93000 ELECTROCARDIOGRAM COMPLETE: CPT | Performed by: INTERNAL MEDICINE

## 2024-03-12 PROCEDURE — G8484 FLU IMMUNIZE NO ADMIN: HCPCS | Performed by: INTERNAL MEDICINE

## 2024-03-12 PROCEDURE — 3078F DIAST BP <80 MM HG: CPT | Performed by: INTERNAL MEDICINE

## 2024-03-12 PROCEDURE — 1036F TOBACCO NON-USER: CPT | Performed by: INTERNAL MEDICINE

## 2024-03-12 PROCEDURE — 3074F SYST BP LT 130 MM HG: CPT | Performed by: INTERNAL MEDICINE

## 2024-03-12 PROCEDURE — G8427 DOCREV CUR MEDS BY ELIG CLIN: HCPCS | Performed by: INTERNAL MEDICINE

## 2024-03-12 PROCEDURE — 1123F ACP DISCUSS/DSCN MKR DOCD: CPT | Performed by: INTERNAL MEDICINE

## 2024-03-12 RX ORDER — AMIODARONE HYDROCHLORIDE 200 MG/1
100 TABLET ORAL DAILY
Qty: 45 TABLET | Refills: 3 | Status: SHIPPED | OUTPATIENT
Start: 2024-03-12

## 2024-03-12 RX ORDER — AMLODIPINE BESYLATE AND BENAZEPRIL HYDROCHLORIDE 5; 20 MG/1; MG/1
1 CAPSULE ORAL DAILY
Qty: 90 CAPSULE | Refills: 3 | Status: SHIPPED | OUTPATIENT
Start: 2024-03-12

## 2024-03-12 RX ORDER — CLOPIDOGREL BISULFATE 75 MG/1
75 TABLET ORAL DAILY
Qty: 90 TABLET | Refills: 3 | Status: SHIPPED | OUTPATIENT
Start: 2024-03-12

## 2024-03-12 RX ORDER — INDAPAMIDE 1.25 MG/1
1.25 TABLET ORAL EVERY MORNING
Qty: 90 TABLET | Refills: 3 | Status: SHIPPED | OUTPATIENT
Start: 2024-03-12

## 2024-03-12 NOTE — ASSESSMENT & PLAN NOTE
Clinically stable.  Continue aggressive risk factor modification for secondary prevention.  Last nuclear stress test was in 2020.

## 2024-03-12 NOTE — PATIENT INSTRUCTIONS
Continue current cardiovascular medications which have been reviewed and discussed individually with you. Primary/secondary prevention is the goal by aggressive risk modification, healthy and therapeutic life style changes for cardiovascular risk reduction. Various goals are discussed and questions answered.  Appropriate prescriptions if needed on this visit are addressed. After visit summery is provided.   Questions answered and patient verbalizes understanding. Follow up in 6 months with ECG,  sooner if needed.

## 2024-03-12 NOTE — PROGRESS NOTES
Reggie Lin  1940  Kayode Gallagher MD      Chief Complaint   Patient presents with    Coronary Artery Disease    Hyperlipidemia    Hypertension    Follow-up     Patient denies any chest pain, dizziness, swelling or palpitations  SOB on exertion      Chief complaint and HPI:  Reggie Lin  is a 83 y.o. male following up for history of paroxysmal atrial fibrillation and has coronary artery disease hypertension hyperlipidemia.  Denies any chest pains or cardiac symptoms.  Denies any palpitations.  Denies any dyspnea on exertion.  He is going to [employer] 3 days a week spent about 30 minutes on bike and treadmill without any problems.  Medications are reviewed and reconciled and he is compliant to his medications.    Rest of the Cardiovascular system review is otherwise unchanged from prior encounter.  Past medical history:  has a past medical history of Arthritis, Back pain, chronic, BPH (benign prostatic hyperplasia), CAD (coronary artery disease), Cancer (HCC), Family history of coronary artery disease, H/O cardiovascular stress test, H/O Doppler ultrasound, H/O Doppler ultrasound, H/O echocardiogram, Hyperlipidemia, Hypertension, Leg swelling, PAF (paroxysmal atrial fibrillation) (HCC), Palpitation, S/P angioplasty with stent, SOB (shortness of breath), and Torsade de pointes.  Past surgical history:  has a past surgical history that includes Abdominal hernia repair; Cataract removal; Diagnostic Cardiac Cath Lab Procedure ( 10/6/03;10/16/03;10/5/04;3/12); Coronary angioplasty with stent; Cholecystectomy, laparoscopic (2022); Cholecystectomy, laparoscopic (N/A, 2022); hernia repair (Left, 10/14/2022); and knee surgery (Right, 2023).  Social History:   Social History     Tobacco Use    Smoking status: Former     Current packs/day: 0.00     Types: Cigarettes, Pipe     Start date: 1955     Quit date: 1980     Years since quittin.2    Smokeless tobacco: Never   Substance Use

## 2024-03-12 NOTE — ASSESSMENT & PLAN NOTE
Tolerating 100 mg of amiodarone daily and QTc is 426 ms.  Creatinine is 1.1 LFTs were not elevated in December of last year.  Patient has regular eye checks by Dr. Nino.

## 2024-06-20 RX ORDER — CLOPIDOGREL BISULFATE 75 MG/1
75 TABLET ORAL DAILY
Qty: 90 TABLET | Refills: 3 | Status: SHIPPED | OUTPATIENT
Start: 2024-06-20

## 2024-06-20 RX ORDER — ATORVASTATIN CALCIUM 40 MG/1
40 TABLET, FILM COATED ORAL DAILY
Qty: 90 TABLET | Refills: 3 | Status: SHIPPED | OUTPATIENT
Start: 2024-06-20

## 2024-06-20 RX ORDER — AMIODARONE HYDROCHLORIDE 200 MG/1
100 TABLET ORAL DAILY
Qty: 45 TABLET | Refills: 3 | Status: SHIPPED | OUTPATIENT
Start: 2024-06-20

## 2024-06-20 RX ORDER — INDAPAMIDE 1.25 MG/1
1.25 TABLET ORAL EVERY MORNING
Qty: 90 TABLET | Refills: 3 | Status: SHIPPED | OUTPATIENT
Start: 2024-06-20

## 2024-06-20 RX ORDER — AMLODIPINE BESYLATE AND BENAZEPRIL HYDROCHLORIDE 5; 20 MG/1; MG/1
1 CAPSULE ORAL DAILY
Qty: 90 CAPSULE | Refills: 3 | Status: SHIPPED | OUTPATIENT
Start: 2024-06-20

## 2024-09-17 ENCOUNTER — OFFICE VISIT (OUTPATIENT)
Dept: CARDIOLOGY CLINIC | Age: 84
End: 2024-09-17
Payer: MEDICARE

## 2024-09-17 VITALS
SYSTOLIC BLOOD PRESSURE: 122 MMHG | HEIGHT: 70 IN | DIASTOLIC BLOOD PRESSURE: 74 MMHG | BODY MASS INDEX: 25.03 KG/M2 | WEIGHT: 174.8 LBS | HEART RATE: 62 BPM

## 2024-09-17 DIAGNOSIS — Z51.81 ENCOUNTER FOR MONITORING AMIODARONE THERAPY: ICD-10-CM

## 2024-09-17 DIAGNOSIS — Z79.899 ENCOUNTER FOR MONITORING AMIODARONE THERAPY: ICD-10-CM

## 2024-09-17 DIAGNOSIS — E78.00 PURE HYPERCHOLESTEROLEMIA: ICD-10-CM

## 2024-09-17 DIAGNOSIS — R00.2 PALPITATION: ICD-10-CM

## 2024-09-17 DIAGNOSIS — I10 ESSENTIAL HYPERTENSION: ICD-10-CM

## 2024-09-17 DIAGNOSIS — Z95.820 S/P ANGIOPLASTY WITH STENT: ICD-10-CM

## 2024-09-17 DIAGNOSIS — I48.0 PAROXYSMAL ATRIAL FIBRILLATION (HCC): Primary | ICD-10-CM

## 2024-09-17 PROCEDURE — G8419 CALC BMI OUT NRM PARAM NOF/U: HCPCS | Performed by: INTERNAL MEDICINE

## 2024-09-17 PROCEDURE — 93000 ELECTROCARDIOGRAM COMPLETE: CPT | Performed by: INTERNAL MEDICINE

## 2024-09-17 PROCEDURE — 1036F TOBACCO NON-USER: CPT | Performed by: INTERNAL MEDICINE

## 2024-09-17 PROCEDURE — 1123F ACP DISCUSS/DSCN MKR DOCD: CPT | Performed by: INTERNAL MEDICINE

## 2024-09-17 PROCEDURE — G8427 DOCREV CUR MEDS BY ELIG CLIN: HCPCS | Performed by: INTERNAL MEDICINE

## 2024-09-17 PROCEDURE — 99214 OFFICE O/P EST MOD 30 MIN: CPT | Performed by: INTERNAL MEDICINE

## 2024-09-17 RX ORDER — ATORVASTATIN CALCIUM 40 MG/1
40 TABLET, FILM COATED ORAL DAILY
Qty: 90 TABLET | Refills: 3 | Status: SHIPPED | OUTPATIENT
Start: 2024-09-17

## 2024-09-17 RX ORDER — INDAPAMIDE 1.25 MG/1
1.25 TABLET ORAL EVERY MORNING
Qty: 90 TABLET | Refills: 3 | Status: SHIPPED | OUTPATIENT
Start: 2024-09-17

## 2024-09-17 RX ORDER — AMLODIPINE AND BENAZEPRIL HYDROCHLORIDE 5; 20 MG/1; MG/1
1 CAPSULE ORAL DAILY
Qty: 90 CAPSULE | Refills: 3 | Status: SHIPPED | OUTPATIENT
Start: 2024-09-17

## 2024-09-17 RX ORDER — AMIODARONE HYDROCHLORIDE 200 MG/1
100 TABLET ORAL DAILY
Qty: 45 TABLET | Refills: 3 | Status: SHIPPED | OUTPATIENT
Start: 2024-09-17

## 2024-09-17 RX ORDER — LATANOPROST 50 UG/ML
1 SOLUTION/ DROPS OPHTHALMIC NIGHTLY
COMMUNITY
Start: 2024-07-12

## 2024-09-17 RX ORDER — CLOPIDOGREL BISULFATE 75 MG/1
75 TABLET ORAL DAILY
Qty: 90 TABLET | Refills: 3 | Status: SHIPPED | OUTPATIENT
Start: 2024-09-17

## 2024-12-24 ENCOUNTER — HOSPITAL ENCOUNTER (OUTPATIENT)
Age: 84
Discharge: HOME | End: 2024-12-24
Payer: MEDICARE

## 2024-12-24 LAB
T4 SERPL-MCNC: 7.8 UG/DL (ref 4.5–10.9)
TSH SERPL DL<=0.05 MIU/L-ACNC: 0.96 UIU/ML (ref 0.27–4.2)

## 2024-12-24 PROCEDURE — 36415 COLL VENOUS BLD VENIPUNCTURE: CPT

## 2024-12-24 PROCEDURE — 84270 ASSAY OF SEX HORMONE GLOBUL: CPT

## 2024-12-24 PROCEDURE — 84403 ASSAY OF TOTAL TESTOSTERONE: CPT

## 2024-12-24 PROCEDURE — 84436 ASSAY OF TOTAL THYROXINE: CPT

## 2024-12-24 PROCEDURE — 84443 ASSAY THYROID STIM HORMONE: CPT

## 2024-12-24 PROCEDURE — 84402 ASSAY OF FREE TESTOSTERONE: CPT

## 2024-12-26 LAB
SEX HORMONE BINDING GLOBULIN: 54 NMOL/L (ref 19–76)
TESTOSTERONE FREE PERCENT: 1.3 % (ref 1.6–2.9)
TESTOSTERONE FREE-MALE: 19 PG/ML (ref 47–244)
TESTOSTERONE TOTAL-MALE: 150 NG/DL (ref 300–720)

## 2024-12-31 ENCOUNTER — TRANSCRIBE ORDERS (OUTPATIENT)
Dept: ADMINISTRATIVE | Age: 84
End: 2024-12-31

## 2024-12-31 DIAGNOSIS — N62 GYNECOMASTIA, MALE: Primary | ICD-10-CM

## 2025-02-06 ENCOUNTER — HOSPITAL ENCOUNTER (OUTPATIENT)
Age: 85
Discharge: HOME | End: 2025-02-06
Payer: MEDICARE

## 2025-02-06 LAB — PSA SERPL-MCNC: 4.74 NG/ML (ref 0–4)

## 2025-02-06 PROCEDURE — G0103 PSA SCREENING: HCPCS

## 2025-02-06 PROCEDURE — 36415 COLL VENOUS BLD VENIPUNCTURE: CPT

## 2025-02-27 ENCOUNTER — OFFICE VISIT (OUTPATIENT)
Age: 85
End: 2025-02-27
Payer: MEDICARE

## 2025-02-27 VITALS
BODY MASS INDEX: 24.97 KG/M2 | RESPIRATION RATE: 16 BRPM | SYSTOLIC BLOOD PRESSURE: 124 MMHG | DIASTOLIC BLOOD PRESSURE: 82 MMHG | OXYGEN SATURATION: 99 % | WEIGHT: 174 LBS | HEART RATE: 64 BPM

## 2025-02-27 DIAGNOSIS — M17.12 PRIMARY OSTEOARTHRITIS OF LEFT KNEE: ICD-10-CM

## 2025-02-27 DIAGNOSIS — M17.11 PRIMARY OSTEOARTHRITIS OF RIGHT KNEE: ICD-10-CM

## 2025-02-27 DIAGNOSIS — I48.0 PAROXYSMAL ATRIAL FIBRILLATION (HCC): Primary | ICD-10-CM

## 2025-02-27 DIAGNOSIS — G89.29 CHRONIC MIDLINE LOW BACK PAIN WITHOUT SCIATICA: ICD-10-CM

## 2025-02-27 DIAGNOSIS — R39.11 BENIGN PROSTATIC HYPERPLASIA WITH URINARY HESITANCY: ICD-10-CM

## 2025-02-27 DIAGNOSIS — N40.1 BENIGN PROSTATIC HYPERPLASIA WITH URINARY HESITANCY: ICD-10-CM

## 2025-02-27 DIAGNOSIS — M54.50 CHRONIC MIDLINE LOW BACK PAIN WITHOUT SCIATICA: ICD-10-CM

## 2025-02-27 PROBLEM — D62 ACUTE BLOOD LOSS ANEMIA: Status: RESOLVED | Noted: 2023-05-10 | Resolved: 2025-02-27

## 2025-02-27 PROCEDURE — 99204 OFFICE O/P NEW MOD 45 MIN: CPT

## 2025-02-27 PROCEDURE — G8420 CALC BMI NORM PARAMETERS: HCPCS

## 2025-02-27 PROCEDURE — G2211 COMPLEX E/M VISIT ADD ON: HCPCS

## 2025-02-27 PROCEDURE — 1036F TOBACCO NON-USER: CPT

## 2025-02-27 PROCEDURE — 1123F ACP DISCUSS/DSCN MKR DOCD: CPT

## 2025-02-27 PROCEDURE — G8427 DOCREV CUR MEDS BY ELIG CLIN: HCPCS

## 2025-02-27 SDOH — ECONOMIC STABILITY: FOOD INSECURITY: WITHIN THE PAST 12 MONTHS, THE FOOD YOU BOUGHT JUST DIDN'T LAST AND YOU DIDN'T HAVE MONEY TO GET MORE.: NEVER TRUE

## 2025-02-27 SDOH — ECONOMIC STABILITY: FOOD INSECURITY: WITHIN THE PAST 12 MONTHS, YOU WORRIED THAT YOUR FOOD WOULD RUN OUT BEFORE YOU GOT MONEY TO BUY MORE.: NEVER TRUE

## 2025-02-27 ASSESSMENT — PATIENT HEALTH QUESTIONNAIRE - PHQ9
1. LITTLE INTEREST OR PLEASURE IN DOING THINGS: NOT AT ALL
SUM OF ALL RESPONSES TO PHQ QUESTIONS 1-9: 0
2. FEELING DOWN, DEPRESSED OR HOPELESS: NOT AT ALL
SUM OF ALL RESPONSES TO PHQ QUESTIONS 1-9: 0

## 2025-02-27 NOTE — PROGRESS NOTES
ability to stand for extended periods. He can lie in bed or sit in a chair without discomfort, but upon standing and attempting to engage in activities, he needs to rest after a few minutes due to back pain. He reports feeling weak and exhausted from the pain. He has not experienced any falls in the past 10 years, except for one incident 2 years ago when he fell backwards onto a bed. He occasionally experiences balance issues. He has been attending the [facility] but has not been consistent this winter. He uses a treadmill for exercise, holding onto the sides to alleviate back pressure. He also uses a NuStep machine and a bicycle for exercise. He typically exercises for 10 minutes before experiencing pain. He takes Tylenol prior to exercising.    FAMILY HISTORY  - Oldest son had to have his prostate removed in his mid-50s    ALLERGIES  - NITROGLYCERIN: severe headaches    MEDICATIONS  - Amlodipine  - Benazepril  - Amiodarone  - Aspirin  - Atorvastatin  - Clopidogrel  - Indapamide  - Latanoprost eyedrops  - Tylenol        Past medical history reviewed  Medication review  Allergies reviewed  Social history reviewed  Surgical history reviewed    Objective       /82 (Site: Right Upper Arm, Position: Sitting, Cuff Size: Medium Adult)   Pulse 64   Resp 16   Wt 78.9 kg (174 lb)   SpO2 99%   BMI 24.97 kg/m²       Physical Exam  Vitals reviewed.   Constitutional:       General: He is not in acute distress.     Appearance: He is not ill-appearing.   Cardiovascular:      Rate and Rhythm: Normal rate and regular rhythm.      Pulses:           Radial pulses are 2+ on the right side and 2+ on the left side.        Dorsalis pedis pulses are 2+ on the right side and 2+ on the left side.      Heart sounds: No murmur heard.     No gallop.   Pulmonary:      Effort: Pulmonary effort is normal.      Breath sounds: Normal breath sounds.   Abdominal:      Palpations: Abdomen is soft.      Tenderness: There is no abdominal tenderness.

## 2025-03-11 ENCOUNTER — HOSPITAL ENCOUNTER (OUTPATIENT)
Age: 85
Discharge: HOME | End: 2025-03-11
Payer: MEDICARE

## 2025-03-11 DIAGNOSIS — I48.0 PAROXYSMAL ATRIAL FIBRILLATION (HCC): ICD-10-CM

## 2025-03-11 DIAGNOSIS — E78.00 PURE HYPERCHOLESTEROLEMIA: ICD-10-CM

## 2025-03-11 DIAGNOSIS — R00.2 PALPITATION: ICD-10-CM

## 2025-03-11 DIAGNOSIS — Z51.81 ENCOUNTER FOR MONITORING AMIODARONE THERAPY: ICD-10-CM

## 2025-03-11 DIAGNOSIS — Z79.899 ENCOUNTER FOR MONITORING AMIODARONE THERAPY: ICD-10-CM

## 2025-03-11 LAB
ALBUMIN SERPL-MCNC: 4 G/DL (ref 3.4–5)
ALBUMIN/GLOB SERPL: 2 {RATIO}
ALP SERPL-CCNC: 98 U/L (ref 40–129)
ALT SERPL-CCNC: 17 U/L (ref 10–40)
ANION GAP SERPL CALCULATED.3IONS-SCNC: 11 MMOL/L (ref 9–17)
AST SERPL-CCNC: 19 U/L (ref 15–37)
BILIRUB SERPL-MCNC: 0.8 MG/DL (ref 0–1)
BUN SERPL-MCNC: 33 MG/DL (ref 7–20)
CALCIUM SERPL-MCNC: 9 MG/DL (ref 8.3–10.6)
CHLORIDE SERPL-SCNC: 106 MMOL/L (ref 99–110)
CHOLEST SERPL-MCNC: 101 MG/DL (ref 125–199)
CO2 SERPL-SCNC: 25 MMOL/L (ref 21–32)
CREAT SERPL-MCNC: 1.3 MG/DL (ref 0.8–1.3)
ERYTHROCYTE [DISTWIDTH] IN BLOOD BY AUTOMATED COUNT: 13.7 % (ref 11.7–14.9)
GFR, ESTIMATED: 54 ML/MIN/1.73M2
GLUCOSE SERPL-MCNC: 102 MG/DL (ref 74–99)
HCT VFR BLD AUTO: 42 % (ref 42–52)
HDLC SERPL-MCNC: 35 MG/DL
HGB BLD-MCNC: 13.4 G/DL (ref 13.5–18)
LDLC SERPL CALC-MCNC: 45 MG/DL
MCH RBC QN AUTO: 28.8 PG (ref 27–31)
MCHC RBC AUTO-ENTMCNC: 31.9 G/DL (ref 32–36)
MCV RBC AUTO: 90.3 FL (ref 78–100)
PLATELET # BLD AUTO: 294 K/UL (ref 140–440)
PMV BLD AUTO: 10.5 FL (ref 7.5–11.1)
POTASSIUM SERPL-SCNC: 4.8 MMOL/L (ref 3.5–5.1)
PROT SERPL-MCNC: 6.1 G/DL (ref 6.4–8.2)
RBC # BLD AUTO: 4.65 M/UL (ref 4.6–6.2)
SODIUM SERPL-SCNC: 142 MMOL/L (ref 136–145)
TRIGL SERPL-MCNC: 102 MG/DL
TSH SERPL DL<=0.05 MIU/L-ACNC: 1.16 UIU/ML (ref 0.27–4.2)
WBC OTHER # BLD: 8.5 K/UL (ref 4–10.5)

## 2025-03-11 PROCEDURE — 36415 COLL VENOUS BLD VENIPUNCTURE: CPT

## 2025-03-11 PROCEDURE — 80061 LIPID PANEL: CPT

## 2025-03-11 PROCEDURE — 85027 COMPLETE CBC AUTOMATED: CPT

## 2025-03-11 PROCEDURE — 80053 COMPREHEN METABOLIC PANEL: CPT

## 2025-03-11 PROCEDURE — 84443 ASSAY THYROID STIM HORMONE: CPT

## 2025-03-17 ENCOUNTER — OFFICE VISIT (OUTPATIENT)
Dept: CARDIOLOGY CLINIC | Age: 85
End: 2025-03-17
Payer: MEDICARE

## 2025-03-17 VITALS
HEIGHT: 68 IN | DIASTOLIC BLOOD PRESSURE: 78 MMHG | BODY MASS INDEX: 26.83 KG/M2 | WEIGHT: 177 LBS | SYSTOLIC BLOOD PRESSURE: 128 MMHG | HEART RATE: 53 BPM

## 2025-03-17 DIAGNOSIS — Z79.899 ENCOUNTER FOR MONITORING AMIODARONE THERAPY: ICD-10-CM

## 2025-03-17 DIAGNOSIS — Z95.820 S/P ANGIOPLASTY WITH STENT: ICD-10-CM

## 2025-03-17 DIAGNOSIS — I48.0 PAROXYSMAL ATRIAL FIBRILLATION (HCC): Primary | ICD-10-CM

## 2025-03-17 DIAGNOSIS — I10 ESSENTIAL HYPERTENSION: ICD-10-CM

## 2025-03-17 DIAGNOSIS — Z51.81 ENCOUNTER FOR MONITORING AMIODARONE THERAPY: ICD-10-CM

## 2025-03-17 DIAGNOSIS — E78.00 PURE HYPERCHOLESTEROLEMIA: ICD-10-CM

## 2025-03-17 DIAGNOSIS — I73.9 CLAUDICATION IN PERIPHERAL VASCULAR DISEASE: ICD-10-CM

## 2025-03-17 PROCEDURE — 93000 ELECTROCARDIOGRAM COMPLETE: CPT | Performed by: INTERNAL MEDICINE

## 2025-03-17 PROCEDURE — 1159F MED LIST DOCD IN RCRD: CPT | Performed by: INTERNAL MEDICINE

## 2025-03-17 PROCEDURE — G8419 CALC BMI OUT NRM PARAM NOF/U: HCPCS | Performed by: INTERNAL MEDICINE

## 2025-03-17 PROCEDURE — 1036F TOBACCO NON-USER: CPT | Performed by: INTERNAL MEDICINE

## 2025-03-17 PROCEDURE — 99214 OFFICE O/P EST MOD 30 MIN: CPT | Performed by: INTERNAL MEDICINE

## 2025-03-17 PROCEDURE — G8427 DOCREV CUR MEDS BY ELIG CLIN: HCPCS | Performed by: INTERNAL MEDICINE

## 2025-03-17 PROCEDURE — 1123F ACP DISCUSS/DSCN MKR DOCD: CPT | Performed by: INTERNAL MEDICINE

## 2025-03-17 NOTE — PROGRESS NOTES
Reggie Lin  1940  Rosemary Aguilar MD      Chief Complaint   Patient presents with    Atrial Fibrillation    Coronary Artery Disease    Hypertension     OV for 6 month check. Pt denies any chest pain,SOB,dizziness,swelling to ankles, or palpitation.     Chief complaint and HPI:  Reggie Lin  is a 84 y.o. male following up for known coronary artery disease and has history of paroxysmal atrial fibrillation has been on amiodarone therapy for several years and has hypertension and hyperlipidemia.  Now he is reporting bilateral calf pain when he walks for about 6 minutes on a treadmill and he has to stop and switch his to other machines at [facility] 3 times a week.  He is concerned whether the symptoms are secondary to atorvastatin.  Denies any chest pains denies any shortness of breath.  He also reports right breast tenderness which he is wondering is secondary to amiodarone.  He reports his son-in-law who had heart transplant had breast tenderness secondary to amiodarone.  I suspect it was Aldactone.  Medications are reviewed and reconciled.    Rest of the Cardiovascular system review is otherwise unchanged from prior encounter.  Past medical history:  has a past medical history of Acute blood loss anemia, Arthritis, Back pain, chronic, BPH (benign prostatic hyperplasia), CAD (coronary artery disease), Cancer (HCC), Family history of coronary artery disease, H/O cardiovascular stress test, H/O Doppler ultrasound, H/O Doppler ultrasound, H/O echocardiogram, Hyperlipidemia, Hypertension, Leg swelling, PAF (paroxysmal atrial fibrillation) (HCC), Palpitation, S/P angioplasty with stent, SOB (shortness of breath), and Torsade de pointes.  Past surgical history:  has a past surgical history that includes Abdominal hernia repair; Cataract removal; Diagnostic Cardiac Cath Lab Procedure ( 10/6/03;10/16/03;10/5/04;3/12); Coronary angioplasty with stent; Cholecystectomy, laparoscopic (07/26/2022); Cholecystectomy,

## 2025-03-17 NOTE — ASSESSMENT & PLAN NOTE
Symptoms suggestive of peripheral artery disease.  I doubt it is secondary to atorvastatin however we can hold atorvastatin temporarily for now and if his symptoms are not resolved he can resume it after 2 weeks of holding it.  We will also obtain lower extremity arterial Doppler to evaluate it further.

## 2025-03-17 NOTE — ASSESSMENT & PLAN NOTE
Lipids well-controlled on current dose of atorvastatin 40 mg daily.  Recent LDL was 45.  He is concerned whether it is causing his calf muscle pain on exercise and he can hold it temporarily to see if it goes away and we will follow-up in 4 weeks with lower extremity arterial Doppler to evaluate for any significant PAD.

## 2025-03-17 NOTE — PATIENT INSTRUCTIONS
Hold Lipitor for now. Lower extremity arterial doppler and follow up.  Appropriate prescriptions if needed on this visit are addressed. After visit summery is provided.   Questions answered and patient verbalizes understanding. Follow up in 4 weeks,  sooner if needed.

## 2025-03-26 ENCOUNTER — PATIENT MESSAGE (OUTPATIENT)
Dept: CARDIOLOGY CLINIC | Age: 85
End: 2025-03-26

## 2025-03-26 ENCOUNTER — HOSPITAL ENCOUNTER (OUTPATIENT)
Dept: GENERAL RADIOLOGY | Age: 85
Discharge: HOME | End: 2025-03-26
Payer: MEDICARE

## 2025-03-26 ENCOUNTER — HOSPITAL ENCOUNTER (OUTPATIENT)
Age: 85
Discharge: HOME | End: 2025-03-26
Payer: MEDICARE

## 2025-03-26 DIAGNOSIS — M54.50 CHRONIC MIDLINE LOW BACK PAIN WITHOUT SCIATICA: ICD-10-CM

## 2025-03-26 DIAGNOSIS — G89.29 CHRONIC MIDLINE LOW BACK PAIN WITHOUT SCIATICA: ICD-10-CM

## 2025-03-26 PROCEDURE — 72110 X-RAY EXAM L-2 SPINE 4/>VWS: CPT

## 2025-03-26 NOTE — TELEPHONE ENCOUNTER
Test results 3/20/2025-  Vascular duplex-    Diffuse calcific plaque bilaterally at CFA, distal SFA, and Popliteal arteries.  Significant arterial disease in the area of distal SFA to Popliteal artery with monophasic waveforms from the bilateral Popliteal artery to ankle.    Possible occlusion of left SFA. Bilateral DPA are occluded.    Bilateral LEO could not be calculated due to Atherosclerosis.    Bilateral ABIs are elevated  >1.4 due to noncompressibility of the vessels due to calcification.    Will discuss the results and further options on his follow-up visit on 4/22/2025.      Please keep scheduled f/u visit for 4/22/2025 w/ Dr. Cameron.

## 2025-03-31 SDOH — HEALTH STABILITY: PHYSICAL HEALTH: ON AVERAGE, HOW MANY DAYS PER WEEK DO YOU ENGAGE IN MODERATE TO STRENUOUS EXERCISE (LIKE A BRISK WALK)?: 2 DAYS

## 2025-03-31 SDOH — HEALTH STABILITY: PHYSICAL HEALTH: ON AVERAGE, HOW MANY MINUTES DO YOU ENGAGE IN EXERCISE AT THIS LEVEL?: 30 MIN

## 2025-03-31 ASSESSMENT — LIFESTYLE VARIABLES
HOW OFTEN DO YOU HAVE A DRINK CONTAINING ALCOHOL: 2
HOW OFTEN DO YOU HAVE A DRINK CONTAINING ALCOHOL: MONTHLY OR LESS
HOW OFTEN DO YOU HAVE SIX OR MORE DRINKS ON ONE OCCASION: 1
HOW MANY STANDARD DRINKS CONTAINING ALCOHOL DO YOU HAVE ON A TYPICAL DAY: 1 OR 2
HOW MANY STANDARD DRINKS CONTAINING ALCOHOL DO YOU HAVE ON A TYPICAL DAY: 1

## 2025-03-31 ASSESSMENT — PATIENT HEALTH QUESTIONNAIRE - PHQ9
SUM OF ALL RESPONSES TO PHQ QUESTIONS 1-9: 0
2. FEELING DOWN, DEPRESSED OR HOPELESS: NOT AT ALL
1. LITTLE INTEREST OR PLEASURE IN DOING THINGS: NOT AT ALL
SUM OF ALL RESPONSES TO PHQ QUESTIONS 1-9: 0

## 2025-04-01 ENCOUNTER — RESULTS FOLLOW-UP (OUTPATIENT)
Age: 85
End: 2025-04-01

## 2025-04-03 ENCOUNTER — OFFICE VISIT (OUTPATIENT)
Age: 85
End: 2025-04-03
Payer: MEDICARE

## 2025-04-03 VITALS
RESPIRATION RATE: 16 BRPM | SYSTOLIC BLOOD PRESSURE: 122 MMHG | OXYGEN SATURATION: 97 % | DIASTOLIC BLOOD PRESSURE: 60 MMHG | BODY MASS INDEX: 26.82 KG/M2 | HEART RATE: 58 BPM | WEIGHT: 176.4 LBS

## 2025-04-03 DIAGNOSIS — Z00.00 INITIAL MEDICARE ANNUAL WELLNESS VISIT: Primary | ICD-10-CM

## 2025-04-03 DIAGNOSIS — Z71.89 ACP (ADVANCE CARE PLANNING): ICD-10-CM

## 2025-04-03 DIAGNOSIS — R79.89 LOW TESTOSTERONE: ICD-10-CM

## 2025-04-03 PROCEDURE — 99497 ADVNCD CARE PLAN 30 MIN: CPT

## 2025-04-03 PROCEDURE — G0438 PPPS, INITIAL VISIT: HCPCS

## 2025-04-03 PROCEDURE — 1123F ACP DISCUSS/DSCN MKR DOCD: CPT

## 2025-04-03 NOTE — PATIENT INSTRUCTIONS
or drug use, talk to your doctor.   Medicines    Take your medicines exactly as prescribed. Call your doctor if you think you are having a problem with your medicine.     If your doctor recommends aspirin, take the amount directed each day. Make sure you take aspirin and not another kind of pain reliever, such as acetaminophen (Tylenol).   When should you call for help?   Call 911 if you have symptoms of a heart attack. These may include:    Chest pain or pressure, or a strange feeling in the chest.     Sweating.     Shortness of breath.     Pain, pressure, or a strange feeling in the back, neck, jaw, or upper belly or in one or both shoulders or arms.     Lightheadedness or sudden weakness.     A fast or irregular heartbeat.   After you call 911, the  may tell you to chew 1 adult-strength or 2 to 4 low-dose aspirin. Wait for an ambulance. Do not try to drive yourself.  Watch closely for changes in your health, and be sure to contact your doctor if you have any problems.  Where can you learn more?  Go to https://www.[employer]net/patientEd and enter F075 to learn more about \"A Healthy Heart: Care Instructions.\"  Current as of: July 31, 2024  Content Version: 14.4  © 2024-2025 [employer].   Care instructions adapted under license by [employer]. If you have questions about a medical condition or this instruction, always ask your healthcare professional. [employer], disclaims any warranty or liability for your use of this information.    Personalized Preventive Plan for Reggie Lin - 4/3/2025  Medicare offers a range of preventive health benefits. Some of the tests and screenings are paid in full while other may be subject to a deductible, co-insurance, and/or copay.  Some of these benefits include a comprehensive review of your medical history including lifestyle, illnesses that may run in your family, and various assessments and screenings as appropriate.  After reviewing your

## 2025-04-03 NOTE — PROGRESS NOTES
Medicare Annual Wellness Visit    Reggie Lin is here for Medicare AWV (No other concerns )    Assessment & Plan  Annual wellness visit  Controlled  - A comprehensive discussion regarding code status and end-of-life care was conducted. He was informed about the potential risks associated with cardiopulmonary resuscitation (CPR), including the likelihood of rib fractures.  - Given his current health status, a do-not-resuscitate (DNR) order was not recommended. He was encouraged to discuss his wishes with his family and provide a copy of his living will.  - Information on advanced directives and life-prolonging treatments was provided.  -18 mins spent discussing ACP  Treatment plan:  - He was advised to perform chair squats daily, starting with three repetitions and gradually increasing by one each day until reaching a maximum of ten repetitions. If leg soreness occurs, he should maintain the previous day's number of repetitions.  - He was also advised to continue using the NuStep machine for 30 minutes daily.  Future Plan:  - If leg soreness occurs, he should maintain the previous day's number of repetitions.      Sleep disturbances  Uncontrolled  - He reported difficulty falling back asleep after waking up to use the bathroom, resulting in fatigue and lack of energy.  Treatment plan:  - He was advised to engage in an activity for 15 minutes if he is unable to fall asleep within 15 minutes.  - The use of melatonin was not recommended at this time.  - Sleep hygiene measures were discussed, including avoiding bright screens and using night mode on devices.  Future Plan:  - If sleep hygiene measures do not improve his sleep, further interventions will be considered.      Low testosterone levels  Uncontrolled  - His testosterone levels are low, which may contribute to fatigue, muscle weakness, osteoporosis, mental fog, depression, anxiety, and decreased libido.  Dx plan:  - Blood work will be ordered to confirm low

## 2025-04-04 ENCOUNTER — CLINICAL SUPPORT (OUTPATIENT)
Age: 85
End: 2025-04-04
Payer: MEDICARE

## 2025-04-04 DIAGNOSIS — R79.89 LOW TESTOSTERONE: ICD-10-CM

## 2025-04-04 PROCEDURE — 36415 COLL VENOUS BLD VENIPUNCTURE: CPT

## 2025-04-04 NOTE — PATIENT INSTRUCTIONS
We are committed to providing you the best care possible.    If you receive a survey after visiting one of our offices, please take time to share your experience concerning your physician office visit.  These surveys are confidential and no health information about you is shared.    We are eager to improve for you and continue to give you satisfactory care, we are counting on your feedback to help make that happen.            Welcome to [facility]:    Did you know we now have a faster way for you to move through your appointment? For your convenience, we now have digital registration available. When you schedule your next appointment, you will receive a link via your email as well as a text message that will allow you to complete any paperwork digitally before your appointment.

## 2025-04-05 LAB
FSH SERPL-ACNC: 69.5 MIU/ML
LH SERPL-ACNC: 51.9 MIU/ML

## 2025-04-08 LAB
SHBG SERPL-SCNC: 52 NMOL/L (ref 19–76)
TESTOST FREE SERPL-MCNC: 34.6 PG/ML (ref 47–244)
TESTOST SERPL-MCNC: 242 NG/DL (ref 193–740)

## 2025-04-22 ENCOUNTER — OFFICE VISIT (OUTPATIENT)
Dept: CARDIOLOGY CLINIC | Age: 85
End: 2025-04-22
Payer: MEDICARE

## 2025-04-22 VITALS
SYSTOLIC BLOOD PRESSURE: 122 MMHG | HEART RATE: 64 BPM | DIASTOLIC BLOOD PRESSURE: 70 MMHG | HEIGHT: 68 IN | BODY MASS INDEX: 26.83 KG/M2 | WEIGHT: 177 LBS

## 2025-04-22 DIAGNOSIS — E78.00 PURE HYPERCHOLESTEROLEMIA: ICD-10-CM

## 2025-04-22 DIAGNOSIS — I48.0 PAROXYSMAL ATRIAL FIBRILLATION (HCC): ICD-10-CM

## 2025-04-22 DIAGNOSIS — Z95.820 S/P ANGIOPLASTY WITH STENT: ICD-10-CM

## 2025-04-22 DIAGNOSIS — I10 ESSENTIAL HYPERTENSION: ICD-10-CM

## 2025-04-22 DIAGNOSIS — I73.9 CLAUDICATION IN PERIPHERAL VASCULAR DISEASE: Primary | ICD-10-CM

## 2025-04-22 PROCEDURE — 1159F MED LIST DOCD IN RCRD: CPT | Performed by: INTERNAL MEDICINE

## 2025-04-22 PROCEDURE — 1036F TOBACCO NON-USER: CPT | Performed by: INTERNAL MEDICINE

## 2025-04-22 PROCEDURE — 1123F ACP DISCUSS/DSCN MKR DOCD: CPT | Performed by: INTERNAL MEDICINE

## 2025-04-22 PROCEDURE — 99213 OFFICE O/P EST LOW 20 MIN: CPT | Performed by: INTERNAL MEDICINE

## 2025-04-22 PROCEDURE — G8427 DOCREV CUR MEDS BY ELIG CLIN: HCPCS | Performed by: INTERNAL MEDICINE

## 2025-04-22 PROCEDURE — G8419 CALC BMI OUT NRM PARAM NOF/U: HCPCS | Performed by: INTERNAL MEDICINE

## 2025-04-22 RX ORDER — ATORVASTATIN CALCIUM 40 MG/1
40 TABLET, FILM COATED ORAL DAILY
Qty: 90 TABLET | Refills: 3 | Status: SHIPPED | OUTPATIENT
Start: 2025-04-22

## 2025-04-22 NOTE — PROGRESS NOTES
Reggie Lin  1940  Rosemary Aguilar MD      Chief Complaint   Patient presents with    Results     Chief complaint and HPI:  Reggie Lin  is a 84 y.o. male following up for results of lower extremity arterial Doppler study.  He was holding atorvastatin and his aches and pains only improved 50% and he would like to go back on it.  He was complaining of mostly aches and pains in calf muscles.  Denies any other cardiac symptoms.  Medications are reviewed and reconciled.    Rest of the Cardiovascular system review is otherwise unchanged from prior encounter.  Past medical history:  has a past medical history of Acute blood loss anemia, Arthritis, Back pain, chronic, BPH (benign prostatic hyperplasia), CAD (coronary artery disease), Cancer (HCC), Family history of coronary artery disease, H/O cardiovascular stress test, H/O Doppler ultrasound, H/O Doppler ultrasound, H/O echocardiogram, Hyperlipidemia, Hypertension, Leg swelling, PAF (paroxysmal atrial fibrillation) (HCC), Palpitation, S/P angioplasty with stent, SOB (shortness of breath), and Torsade de pointes.  Past surgical history:  has a past surgical history that includes Abdominal hernia repair; Cataract removal; Diagnostic Cardiac Cath Lab Procedure ( 10/6/03;10/16/03;10/5/04;3/12); Coronary angioplasty with stent; Cholecystectomy, laparoscopic (2022); Cholecystectomy, laparoscopic (N/A, 2022); hernia repair (Left, 10/14/2022); and knee surgery (Right, 2023).  Social History:   Social History     Tobacco Use    Smoking status: Former     Current packs/day: 0.00     Types: Cigarettes, Pipe     Start date: 1955     Quit date: 1980     Years since quittin.3    Smokeless tobacco: Never   Substance Use Topics    Alcohol use: No     Comment: occassional     Family history: family history includes Coronary Art Dis in his brother and mother; Heart Attack in his mother; Hypertension in his mother.  ALLERGIES:

## 2025-04-22 NOTE — ASSESSMENT & PLAN NOTE
LDL was 45 when he was taking atorvastatin now he is not.  He believes he is leg pain  only partially improved off of statin and he will start taking atorvastatin again.

## 2025-04-22 NOTE — ASSESSMENT & PLAN NOTE
Patient has significant peripheral arterial disease but does not have any disabling symptoms.  He does not have any signs of acute limb ischemia.  Continue aggressive risk factor modification and medical treatment.  We will consider angiographic evaluation only if he has disabling symptoms or nonhealing ulcers.  Counseled to take care of foot hygiene and avoid tight Juliocesar fitting footwear's and walk regularly for exercise as much as he can to the claudication distance and stop and walk again.  He verbalized understanding.

## 2025-04-22 NOTE — PATIENT INSTRUCTIONS
Resume Atorvastatin 40 mg daily. Walk daily. Continue other current cardiovascular medications which have been reviewed and discussed individually with you.  Appropriate prescriptions if needed on this visit are addressed. After visit summery is provided.   Questions answered and patient verbalizes understanding. Follow up in 6 months,  sooner if needed.

## 2025-05-01 ENCOUNTER — OFFICE VISIT (OUTPATIENT)
Age: 85
End: 2025-05-01
Payer: MEDICARE

## 2025-05-01 VITALS
SYSTOLIC BLOOD PRESSURE: 100 MMHG | DIASTOLIC BLOOD PRESSURE: 60 MMHG | HEART RATE: 66 BPM | RESPIRATION RATE: 16 BRPM | OXYGEN SATURATION: 95 %

## 2025-05-01 DIAGNOSIS — R00.1 BRADYCARDIA: ICD-10-CM

## 2025-05-01 DIAGNOSIS — M41.34 THORACOGENIC SCOLIOSIS OF THORACIC REGION: Primary | ICD-10-CM

## 2025-05-01 PROCEDURE — G2211 COMPLEX E/M VISIT ADD ON: HCPCS

## 2025-05-01 PROCEDURE — G8427 DOCREV CUR MEDS BY ELIG CLIN: HCPCS

## 2025-05-01 PROCEDURE — 1123F ACP DISCUSS/DSCN MKR DOCD: CPT

## 2025-05-01 PROCEDURE — 1159F MED LIST DOCD IN RCRD: CPT

## 2025-05-01 PROCEDURE — 99214 OFFICE O/P EST MOD 30 MIN: CPT

## 2025-05-01 PROCEDURE — 1036F TOBACCO NON-USER: CPT

## 2025-05-01 PROCEDURE — G8419 CALC BMI OUT NRM PARAM NOF/U: HCPCS

## 2025-05-01 RX ORDER — DORZOLAMIDE HYDROCHLORIDE AND TIMOLOL MALEATE 20; 5 MG/ML; MG/ML
SOLUTION/ DROPS OPHTHALMIC
COMMUNITY
Start: 2025-03-11

## 2025-05-01 NOTE — PROGRESS NOTES
function (TSH) is normal, and cholesterol levels are generally good, with HDL at 35. Kidney function shows a stable creatinine level of 1.3, which is considered normal for his age. PSA levels are also fine.    He has a history of bradycardia and is currently under the care of Dr. Cameron, a cardiologist. He has been on amiodarone for the past 12 years, which he believes may be causing side effects such as excessive salivation and breast soreness. His heart rate is not consistently low, sometimes reaching into the 70s.    PAST SURGICAL HISTORY:  Knee surgery        Past medical history reviewed  Medication review  Allergies reviewed  Social history reviewed    Objective       /60 (BP Site: Right Upper Arm, Patient Position: Sitting, BP Cuff Size: Medium Adult)   Pulse 66   Resp 16   SpO2 95%       Physical Exam  Vitals reviewed.   Constitutional:       General: He is not in acute distress.     Appearance: He is not ill-appearing.   Cardiovascular:      Rate and Rhythm: Normal rate and regular rhythm.      Pulses:           Radial pulses are 2+ on the right side and 2+ on the left side.        Dorsalis pedis pulses are 2+ on the right side and 2+ on the left side.      Heart sounds: No murmur heard.     No gallop.   Pulmonary:      Effort: Pulmonary effort is normal.      Breath sounds: Normal breath sounds.   Abdominal:      Palpations: Abdomen is soft.      Tenderness: There is no abdominal tenderness.   Musculoskeletal:      Cervical back: Normal range of motion and neck supple.      Right lower leg: No edema.      Left lower leg: No edema.   Skin:     General: Skin is warm and dry.      Capillary Refill: Capillary refill takes less than 2 seconds.   Neurological:      Mental Status: He is alert.      Deep Tendon Reflexes:      Reflex Scores:       Patellar reflexes are 2+ on the right side and 2+ on the left side.  Psychiatric:         Attention and Perception: Attention and perception normal.         Mood

## (undated) DEVICE — ARM DRAPE

## (undated) DEVICE — GOWN,ECLIPSE,POLYRNF,BRTHSLV,L,30/CS: Brand: MEDLINE

## (undated) DEVICE — SYRINGE MED 20ML STD CLR PLAS LUERLOCK TIP N CTRL DISP

## (undated) DEVICE — SYRINGE 20ML LL S/C 50

## (undated) DEVICE — CLIP INT XL YEL POLYMER HEM-O-LOK WECK

## (undated) DEVICE — SOLUTION IV IRRIG WATER 1000ML POUR BRL 2F7114

## (undated) DEVICE — GLOVE SURG SZ 6 THK91MIL LTX FREE SYN POLYISOPRENE ANTI

## (undated) DEVICE — BLADELESS OBTURATOR: Brand: WECK VISTA

## (undated) DEVICE — GOWN,SIRUS,FABRNF,RAGLAN,XL,ST,28/CS: Brand: MEDLINE

## (undated) DEVICE — NEEDLE HYPO 20GA L1.5IN YEL POLYPR HUB S STL REG BVL STR

## (undated) DEVICE — TROCAR: Brand: KII FIOS FIRST ENTRY

## (undated) DEVICE — TIP COVER ACCESSORY

## (undated) DEVICE — GLOVE SURG SZ 65 CRM LTX FREE POLYISOPRENE POLYMER BEAD ANTI

## (undated) DEVICE — SUTURE COAT VCRL SZ 4-0 L18IN ABSRB UD L19MM PS-2 1/2 CIR J496G

## (undated) DEVICE — BLADE CLIPPER GEN PURP NS

## (undated) DEVICE — GLOVE SURG SZ 65 L12IN FNGR THK79MIL GRN LTX FREE

## (undated) DEVICE — CHLORAPREP 26ML ORANGE

## (undated) DEVICE — GLOVE ORANGE PI 7 1/2   MSG9075

## (undated) DEVICE — COLUMN DRAPE

## (undated) DEVICE — SUTURE V-LOC 180 SZ 3-0 L12IN ABSRB GRN V-20 L26MM 1/2 CIR VLOCL0614

## (undated) DEVICE — SUTURE STRATAFIX SZ 3-0 L20CM ABSRB VLT NDL SH-1 L22MM 1/2 SXPP1B453

## (undated) DEVICE — GOWN,SIRUS,POLYRNF,BRTHSLV,XLN/XL,20/CS: Brand: MEDLINE

## (undated) DEVICE — TOTAL TRAY, DB, 100% SILI FOLEY, 16FR 10: Brand: MEDLINE

## (undated) DEVICE — GLOVE ORANGE PI 8   MSG9080

## (undated) DEVICE — TUBING INSUFFLATOR HEAT HI FLO SET PNEUMOCLEAR

## (undated) DEVICE — TISSUE RETRIEVAL SYSTEM: Brand: INZII RETRIEVAL SYSTEM

## (undated) DEVICE — SUTURE SZ 0 27IN 5/8 CIR UR-6  TAPER PT VIOLET ABSRB VICRYL J603H

## (undated) DEVICE — 3M™ IOBAN™ 2 ANTIMICROBIAL INCISE DRAPE 6650EZ: Brand: IOBAN™ 2

## (undated) DEVICE — ADHESIVE SKIN CLSR 0.7ML TOP DERMBND ADV

## (undated) DEVICE — PACK SURG LAP CHOLE

## (undated) DEVICE — SUTURE VCRL SZ 4-0 L27IN ABSRB UD L19MM FS-2 3/8 CIR REV J422H

## (undated) DEVICE — SUTURE ABSORBABLE BRAIDED 4-0 FS-2 18 IN VIO SLV VICRYL J392H

## (undated) DEVICE — DRESSING HEMSTAT W1X2IN ABSRB SURGCEL SNOW

## (undated) DEVICE — SUTURE VCRL SZ 4-0 L18IN ABSRB UD L19MM PS-2 3/8 CIR PRIM J496H

## (undated) DEVICE — BANDAGE ADH W2XL4IN NITRL FAB STRP CURAD

## (undated) DEVICE — REDUCER: Brand: ENDOWRIST

## (undated) DEVICE — GLOVE SURG SZ 7 L12IN THK7.5MIL DK GRN LTX FREE MSG6570] MEDLINE INDUSTRIES INC]

## (undated) DEVICE — SCISSORS ENDOSCP DIA5MM CRV MPLR CAUT W/ RATCH HNDL

## (undated) DEVICE — TROCAR: Brand: KII® SLEEVE

## (undated) DEVICE — SYRINGE ONLY,20ML LUER LOCK: Brand: MEDLINE INDUSTRIES, INC.

## (undated) DEVICE — GLOVE SURG SZ 65 THK91MIL LTX FREE SYN POLYISOPRENE

## (undated) DEVICE — TOWEL,OR,DSP,ST,BLUE,STD,6/PK,12PK/CS: Brand: MEDLINE

## (undated) DEVICE — GLOVE ORANGE PI 7   MSG9070

## (undated) DEVICE — CANNULA SEAL

## (undated) DEVICE — POSITIONER,HEAD,RING CUSHION,9IN,32CS: Brand: MEDLINE

## (undated) DEVICE — POSITIONER HD AD W4.5XH8XL9IN HIGHLY RESILIENT FOAM CMFRT

## (undated) DEVICE — SUTURE VCRL SZ 1 L27IN ABSRB VLT L26MM CT-2 1/2 CIR J335H